# Patient Record
Sex: MALE | Race: BLACK OR AFRICAN AMERICAN | NOT HISPANIC OR LATINO | Employment: OTHER | URBAN - METROPOLITAN AREA
[De-identification: names, ages, dates, MRNs, and addresses within clinical notes are randomized per-mention and may not be internally consistent; named-entity substitution may affect disease eponyms.]

---

## 2020-11-13 ENCOUNTER — LAB REQUISITION (OUTPATIENT)
Dept: LAB | Facility: HOSPITAL | Age: 45
End: 2020-11-13

## 2020-11-13 DIAGNOSIS — Z00.00 ROUTINE GENERAL MEDICAL EXAMINATION AT A HEALTH CARE FACILITY: ICD-10-CM

## 2020-11-13 LAB — POTASSIUM SERPL-SCNC: 5.2 MMOL/L (ref 3.5–5.2)

## 2020-11-13 PROCEDURE — 84132 ASSAY OF SERUM POTASSIUM: CPT

## 2021-01-01 ENCOUNTER — APPOINTMENT (OUTPATIENT)
Dept: GENERAL RADIOLOGY | Facility: HOSPITAL | Age: 46
End: 2021-01-01

## 2021-01-01 ENCOUNTER — READMISSION MANAGEMENT (OUTPATIENT)
Dept: CALL CENTER | Facility: HOSPITAL | Age: 46
End: 2021-01-01

## 2021-01-01 ENCOUNTER — APPOINTMENT (OUTPATIENT)
Dept: NEPHROLOGY | Facility: HOSPITAL | Age: 46
End: 2021-01-01

## 2021-01-01 ENCOUNTER — APPOINTMENT (OUTPATIENT)
Dept: INTERVENTIONAL RADIOLOGY/VASCULAR | Facility: HOSPITAL | Age: 46
End: 2021-01-01

## 2021-01-01 ENCOUNTER — TRANSITIONAL CARE MANAGEMENT TELEPHONE ENCOUNTER (OUTPATIENT)
Dept: CALL CENTER | Facility: HOSPITAL | Age: 46
End: 2021-01-01

## 2021-01-01 ENCOUNTER — HOSPITAL ENCOUNTER (INPATIENT)
Facility: HOSPITAL | Age: 46
LOS: 9 days | Discharge: HOME HEALTH | End: 2021-10-29
Attending: EMERGENCY MEDICINE | Admitting: INTERNAL MEDICINE

## 2021-01-01 ENCOUNTER — HOSPITAL ENCOUNTER (INPATIENT)
Facility: HOSPITAL | Age: 46
LOS: 16 days | End: 2021-12-04
Attending: EMERGENCY MEDICINE | Admitting: INTERNAL MEDICINE

## 2021-01-01 ENCOUNTER — APPOINTMENT (OUTPATIENT)
Dept: CARDIOLOGY | Facility: HOSPITAL | Age: 46
End: 2021-01-01

## 2021-01-01 ENCOUNTER — TELEPHONE (OUTPATIENT)
Dept: INTERNAL MEDICINE | Facility: CLINIC | Age: 46
End: 2021-01-01

## 2021-01-01 ENCOUNTER — APPOINTMENT (OUTPATIENT)
Dept: CT IMAGING | Facility: HOSPITAL | Age: 46
End: 2021-01-01

## 2021-01-01 ENCOUNTER — HOME HEALTH ADMISSION (OUTPATIENT)
Dept: HOME HEALTH SERVICES | Facility: HOME HEALTHCARE | Age: 46
End: 2021-01-01

## 2021-01-01 VITALS
OXYGEN SATURATION: 97 % | TEMPERATURE: 97.6 F | SYSTOLIC BLOOD PRESSURE: 83 MMHG | BODY MASS INDEX: 20.59 KG/M2 | DIASTOLIC BLOOD PRESSURE: 60 MMHG | RESPIRATION RATE: 16 BRPM | HEART RATE: 68 BPM | WEIGHT: 139 LBS | HEIGHT: 69 IN

## 2021-01-01 VITALS
BODY MASS INDEX: 22.84 KG/M2 | SYSTOLIC BLOOD PRESSURE: 69 MMHG | OXYGEN SATURATION: 100 % | RESPIRATION RATE: 16 BRPM | WEIGHT: 168.65 LBS | DIASTOLIC BLOOD PRESSURE: 25 MMHG | HEIGHT: 72 IN | TEMPERATURE: 98 F | HEART RATE: 67 BPM

## 2021-01-01 DIAGNOSIS — N18.9 CHRONIC RENAL FAILURE, UNSPECIFIED CKD STAGE: ICD-10-CM

## 2021-01-01 DIAGNOSIS — E87.5 HYPERKALEMIA: ICD-10-CM

## 2021-01-01 DIAGNOSIS — I95.9 HYPOTENSION, UNSPECIFIED HYPOTENSION TYPE: ICD-10-CM

## 2021-01-01 DIAGNOSIS — I95.9 HYPOTENSION, UNSPECIFIED HYPOTENSION TYPE: Primary | ICD-10-CM

## 2021-01-01 DIAGNOSIS — N18.6 ESRD (END STAGE RENAL DISEASE) (HCC): ICD-10-CM

## 2021-01-01 DIAGNOSIS — R09.02 HYPOXIA: Primary | ICD-10-CM

## 2021-01-01 DIAGNOSIS — J44.1 COPD EXACERBATION (HCC): ICD-10-CM

## 2021-01-01 DIAGNOSIS — R09.02 HYPOXIA: ICD-10-CM

## 2021-01-01 DIAGNOSIS — J96.21 ACUTE ON CHRONIC RESPIRATORY FAILURE WITH HYPOXIA (HCC): ICD-10-CM

## 2021-01-01 DIAGNOSIS — D64.9 ANEMIA, UNSPECIFIED TYPE: ICD-10-CM

## 2021-01-01 LAB
ALBUMIN SERPL-MCNC: 2.5 G/DL (ref 3.5–5.2)
ALBUMIN SERPL-MCNC: 2.7 G/DL (ref 3.5–5.2)
ALBUMIN SERPL-MCNC: 3.1 G/DL (ref 3.5–5.2)
ALBUMIN SERPL-MCNC: 3.2 G/DL (ref 3.5–5.2)
ALBUMIN SERPL-MCNC: 3.2 G/DL (ref 3.5–5.2)
ALBUMIN SERPL-MCNC: 3.3 G/DL (ref 3.5–5.2)
ALBUMIN SERPL-MCNC: 3.3 G/DL (ref 3.5–5.2)
ALBUMIN SERPL-MCNC: 3.4 G/DL (ref 3.5–5.2)
ALBUMIN SERPL-MCNC: 3.5 G/DL (ref 3.5–5.2)
ALBUMIN SERPL-MCNC: 3.6 G/DL (ref 3.5–5.2)
ALBUMIN SERPL-MCNC: 3.7 G/DL (ref 3.5–5.2)
ALBUMIN SERPL-MCNC: 3.7 G/DL (ref 3.5–5.2)
ALBUMIN SERPL-MCNC: 3.8 G/DL (ref 3.5–5.2)
ALBUMIN SERPL-MCNC: 3.9 G/DL (ref 3.5–5.2)
ALBUMIN SERPL-MCNC: 4 G/DL (ref 3.5–5.2)
ALBUMIN SERPL-MCNC: 4.1 G/DL (ref 3.5–5.2)
ALBUMIN SERPL-MCNC: 4.1 G/DL (ref 3.5–5.2)
ALBUMIN SERPL-MCNC: 4.2 G/DL (ref 3.5–5.2)
ALBUMIN SERPL-MCNC: 4.4 G/DL (ref 3.5–5.2)
ALBUMIN/GLOB SERPL: 0.8 G/DL
ALBUMIN/GLOB SERPL: 0.9 G/DL
ALBUMIN/GLOB SERPL: 1 G/DL
ALBUMIN/GLOB SERPL: 1.1 G/DL
ALBUMIN/GLOB SERPL: 1.3 G/DL
ALP SERPL-CCNC: 199 U/L (ref 39–117)
ALP SERPL-CCNC: 245 U/L (ref 39–117)
ALP SERPL-CCNC: 249 U/L (ref 39–117)
ALP SERPL-CCNC: 277 U/L (ref 39–117)
ALP SERPL-CCNC: 277 U/L (ref 39–117)
ALP SERPL-CCNC: 307 U/L (ref 39–117)
ALP SERPL-CCNC: 324 U/L (ref 39–117)
ALP SERPL-CCNC: 335 U/L (ref 39–117)
ALT SERPL W P-5'-P-CCNC: 10 U/L (ref 1–41)
ALT SERPL W P-5'-P-CCNC: 11 U/L (ref 1–41)
ALT SERPL W P-5'-P-CCNC: 14 U/L (ref 1–41)
ALT SERPL W P-5'-P-CCNC: 17 U/L (ref 1–41)
ALT SERPL W P-5'-P-CCNC: 7 U/L (ref 1–41)
ALT SERPL W P-5'-P-CCNC: 7 U/L (ref 1–41)
ALT SERPL W P-5'-P-CCNC: 8 U/L (ref 1–41)
ALT SERPL W P-5'-P-CCNC: 8 U/L (ref 1–41)
ANION GAP SERPL CALCULATED.3IONS-SCNC: 10 MMOL/L (ref 5–15)
ANION GAP SERPL CALCULATED.3IONS-SCNC: 11 MMOL/L (ref 5–15)
ANION GAP SERPL CALCULATED.3IONS-SCNC: 12 MMOL/L (ref 5–15)
ANION GAP SERPL CALCULATED.3IONS-SCNC: 13 MMOL/L (ref 5–15)
ANION GAP SERPL CALCULATED.3IONS-SCNC: 13 MMOL/L (ref 5–15)
ANION GAP SERPL CALCULATED.3IONS-SCNC: 14 MMOL/L (ref 5–15)
ANION GAP SERPL CALCULATED.3IONS-SCNC: 15 MMOL/L (ref 5–15)
ANION GAP SERPL CALCULATED.3IONS-SCNC: 16 MMOL/L (ref 5–15)
ANION GAP SERPL CALCULATED.3IONS-SCNC: 17 MMOL/L (ref 5–15)
ANION GAP SERPL CALCULATED.3IONS-SCNC: 17 MMOL/L (ref 5–15)
ANION GAP SERPL CALCULATED.3IONS-SCNC: 18 MMOL/L (ref 5–15)
ANION GAP SERPL CALCULATED.3IONS-SCNC: 20 MMOL/L (ref 5–15)
ANION GAP SERPL CALCULATED.3IONS-SCNC: 22 MMOL/L (ref 5–15)
ANION GAP SERPL CALCULATED.3IONS-SCNC: 8 MMOL/L (ref 5–15)
ANION GAP SERPL CALCULATED.3IONS-SCNC: 9 MMOL/L (ref 5–15)
APTT PPP: 101.2 SECONDS (ref 50–95)
APTT PPP: 122 SECONDS (ref 50–95)
APTT PPP: 133.9 SECONDS (ref 50–95)
APTT PPP: 38.8 SECONDS (ref 50–95)
APTT PPP: 48.7 SECONDS (ref 50–95)
APTT PPP: 66.8 SECONDS (ref 50–95)
APTT PPP: 71.3 SECONDS (ref 50–95)
APTT PPP: 73.5 SECONDS (ref 50–95)
APTT PPP: 73.9 SECONDS (ref 50–95)
APTT PPP: 97.7 SECONDS (ref 50–95)
ARTERIAL PATENCY WRIST A: ABNORMAL
AST SERPL-CCNC: 11 U/L (ref 1–40)
AST SERPL-CCNC: 14 U/L (ref 1–40)
AST SERPL-CCNC: 14 U/L (ref 1–40)
AST SERPL-CCNC: 18 U/L (ref 1–40)
AST SERPL-CCNC: 27 U/L (ref 1–40)
AST SERPL-CCNC: 31 U/L (ref 1–40)
AST SERPL-CCNC: 33 U/L (ref 1–40)
AST SERPL-CCNC: 37 U/L (ref 1–40)
ATMOSPHERIC PRESS: ABNORMAL MM[HG]
BACTERIA SPEC AEROBE CULT: NORMAL
BASE EXCESS BLDA CALC-SCNC: -0.3 MMOL/L (ref 0–2)
BASE EXCESS BLDA CALC-SCNC: -1 MMOL/L (ref 0–2)
BASE EXCESS BLDA CALC-SCNC: -1.1 MMOL/L (ref 0–2)
BASE EXCESS BLDA CALC-SCNC: -1.2 MMOL/L (ref 0–2)
BASE EXCESS BLDA CALC-SCNC: -1.5 MMOL/L (ref 0–2)
BASE EXCESS BLDA CALC-SCNC: -3.6 MMOL/L (ref 0–2)
BASE EXCESS BLDA CALC-SCNC: -4.8 MMOL/L (ref 0–2)
BASOPHILS # BLD AUTO: 0.01 10*3/MM3 (ref 0–0.2)
BASOPHILS # BLD AUTO: 0.02 10*3/MM3 (ref 0–0.2)
BASOPHILS # BLD AUTO: 0.03 10*3/MM3 (ref 0–0.2)
BASOPHILS # BLD AUTO: 0.04 10*3/MM3 (ref 0–0.2)
BASOPHILS # BLD AUTO: 0.05 10*3/MM3 (ref 0–0.2)
BASOPHILS NFR BLD AUTO: 0.2 % (ref 0–1.5)
BASOPHILS NFR BLD AUTO: 0.3 % (ref 0–1.5)
BASOPHILS NFR BLD AUTO: 0.7 % (ref 0–1.5)
BASOPHILS NFR BLD AUTO: 0.8 % (ref 0–1.5)
BASOPHILS NFR BLD AUTO: 0.9 % (ref 0–1.5)
BASOPHILS NFR BLD AUTO: 0.9 % (ref 0–1.5)
BASOPHILS NFR BLD AUTO: 1 % (ref 0–1.5)
BASOPHILS NFR BLD AUTO: 1.1 % (ref 0–1.5)
BASOPHILS NFR BLD AUTO: 1.1 % (ref 0–1.5)
BDY SITE: ABNORMAL
BH CV ECHO MEAS - AO MAX PG (FULL): 0.88 MMHG
BH CV ECHO MEAS - AO MAX PG (FULL): 2.6 MMHG
BH CV ECHO MEAS - AO MAX PG: 3 MMHG
BH CV ECHO MEAS - AO MAX PG: 6 MMHG
BH CV ECHO MEAS - AO MEAN PG (FULL): 0.69 MMHG
BH CV ECHO MEAS - AO MEAN PG (FULL): 1 MMHG
BH CV ECHO MEAS - AO MEAN PG: 1.7 MMHG
BH CV ECHO MEAS - AO MEAN PG: 3 MMHG
BH CV ECHO MEAS - AO ROOT AREA (BSA CORRECTED): 1.7
BH CV ECHO MEAS - AO ROOT AREA (BSA CORRECTED): 1.9
BH CV ECHO MEAS - AO ROOT AREA: 7.3 CM^2
BH CV ECHO MEAS - AO ROOT AREA: 8.6 CM^2
BH CV ECHO MEAS - AO ROOT DIAM: 3.1 CM
BH CV ECHO MEAS - AO ROOT DIAM: 3.3 CM
BH CV ECHO MEAS - AO V2 MAX: 124 CM/SEC
BH CV ECHO MEAS - AO V2 MAX: 90.1 CM/SEC
BH CV ECHO MEAS - AO V2 MEAN: 61.4 CM/SEC
BH CV ECHO MEAS - AO V2 MEAN: 76.8 CM/SEC
BH CV ECHO MEAS - AO V2 VTI: 14.2 CM
BH CV ECHO MEAS - AO V2 VTI: 26.1 CM
BH CV ECHO MEAS - ASC AORTA: 2.9 CM
BH CV ECHO MEAS - AVA(I,A): 2.8 CM^2
BH CV ECHO MEAS - AVA(I,A): 3 CM^2
BH CV ECHO MEAS - AVA(I,D): 2.8 CM^2
BH CV ECHO MEAS - AVA(I,D): 3 CM^2
BH CV ECHO MEAS - AVA(V,A): 2.5 CM^2
BH CV ECHO MEAS - AVA(V,A): 2.8 CM^2
BH CV ECHO MEAS - AVA(V,D): 2.5 CM^2
BH CV ECHO MEAS - AVA(V,D): 2.8 CM^2
BH CV ECHO MEAS - BSA(HAYCOCK): 1.7 M^2
BH CV ECHO MEAS - BSA(HAYCOCK): 1.8 M^2
BH CV ECHO MEAS - BSA: 1.8 M^2
BH CV ECHO MEAS - BSA: 1.8 M^2
BH CV ECHO MEAS - BZI_BMI: 17.1 KILOGRAMS/M^2
BH CV ECHO MEAS - BZI_BMI: 21.7 KILOGRAMS/M^2
BH CV ECHO MEAS - BZI_METRIC_HEIGHT: 175.3 CM
BH CV ECHO MEAS - BZI_METRIC_HEIGHT: 182.9 CM
BH CV ECHO MEAS - BZI_METRIC_WEIGHT: 57.2 KG
BH CV ECHO MEAS - BZI_METRIC_WEIGHT: 66.7 KG
BH CV ECHO MEAS - EDV(CUBED): 22.7 ML
BH CV ECHO MEAS - EDV(CUBED): 74.1 ML
BH CV ECHO MEAS - EDV(MOD-SP2): 106 ML
BH CV ECHO MEAS - EDV(MOD-SP2): 37.1 ML
BH CV ECHO MEAS - EDV(MOD-SP4): 110 ML
BH CV ECHO MEAS - EDV(MOD-SP4): 51.5 ML
BH CV ECHO MEAS - EDV(TEICH): 30.4 ML
BH CV ECHO MEAS - EDV(TEICH): 78.6 ML
BH CV ECHO MEAS - EF(CUBED): 37.5 %
BH CV ECHO MEAS - EF(CUBED): 70.4 %
BH CV ECHO MEAS - EF(MOD-BP): 48.9 %
BH CV ECHO MEAS - EF(MOD-BP): 62 %
BH CV ECHO MEAS - EF(MOD-SP2): 51.5 %
BH CV ECHO MEAS - EF(MOD-SP2): 60.8 %
BH CV ECHO MEAS - EF(MOD-SP4): 47.4 %
BH CV ECHO MEAS - EF(MOD-SP4): 62.3 %
BH CV ECHO MEAS - EF(TEICH): 32.2 %
BH CV ECHO MEAS - EF(TEICH): 62.4 %
BH CV ECHO MEAS - ESV(CUBED): 14.2 ML
BH CV ECHO MEAS - ESV(CUBED): 22 ML
BH CV ECHO MEAS - ESV(MOD-SP2): 18 ML
BH CV ECHO MEAS - ESV(MOD-SP2): 41.6 ML
BH CV ECHO MEAS - ESV(MOD-SP4): 27.1 ML
BH CV ECHO MEAS - ESV(MOD-SP4): 41.5 ML
BH CV ECHO MEAS - ESV(TEICH): 20.6 ML
BH CV ECHO MEAS - ESV(TEICH): 29.6 ML
BH CV ECHO MEAS - FS: 14.5 %
BH CV ECHO MEAS - FS: 33.3 %
BH CV ECHO MEAS - IVS/LVPW: 0.83
BH CV ECHO MEAS - IVS/LVPW: 1.3
BH CV ECHO MEAS - IVSD: 1 CM
BH CV ECHO MEAS - IVSD: 1.1 CM
BH CV ECHO MEAS - LA DIMENSION: 2.1 CM
BH CV ECHO MEAS - LA DIMENSION: 3.5 CM
BH CV ECHO MEAS - LA/AO: 0.62
BH CV ECHO MEAS - LA/AO: 1.1
BH CV ECHO MEAS - LAD MAJOR: 5.8 CM
BH CV ECHO MEAS - LAD MAJOR: 7 CM
BH CV ECHO MEAS - LAT PEAK E' VEL: 14.4 CM/SEC
BH CV ECHO MEAS - LAT PEAK E' VEL: 9.2 CM/SEC
BH CV ECHO MEAS - LATERAL E/E' RATIO: 6.2
BH CV ECHO MEAS - LATERAL E/E' RATIO: 6.7
BH CV ECHO MEAS - LV DIASTOLIC VOL/BSA (35-75): 29.4 ML/M^2
BH CV ECHO MEAS - LV DIASTOLIC VOL/BSA (35-75): 60.7 ML/M^2
BH CV ECHO MEAS - LV IVRT: 0.09 SEC
BH CV ECHO MEAS - LV MASS(C)D: 157.1 GRAMS
BH CV ECHO MEAS - LV MASS(C)D: 73 GRAMS
BH CV ECHO MEAS - LV MASS(C)DI: 41.7 GRAMS/M^2
BH CV ECHO MEAS - LV MASS(C)DI: 86.7 GRAMS/M^2
BH CV ECHO MEAS - LV MAX PG: 2.1 MMHG
BH CV ECHO MEAS - LV MAX PG: 3.4 MMHG
BH CV ECHO MEAS - LV MEAN PG: 1 MMHG
BH CV ECHO MEAS - LV MEAN PG: 2 MMHG
BH CV ECHO MEAS - LV SYSTOLIC VOL/BSA (12-30): 15.5 ML/M^2
BH CV ECHO MEAS - LV SYSTOLIC VOL/BSA (12-30): 22.9 ML/M^2
BH CV ECHO MEAS - LV V1 MAX: 72.8 CM/SEC
BH CV ECHO MEAS - LV V1 MAX: 92 CM/SEC
BH CV ECHO MEAS - LV V1 MEAN: 47.4 CM/SEC
BH CV ECHO MEAS - LV V1 MEAN: 58.2 CM/SEC
BH CV ECHO MEAS - LV V1 VTI: 12.8 CM
BH CV ECHO MEAS - LV V1 VTI: 20.6 CM
BH CV ECHO MEAS - LVIDD: 2.8 CM
BH CV ECHO MEAS - LVIDD: 4.2 CM
BH CV ECHO MEAS - LVIDS: 2.4 CM
BH CV ECHO MEAS - LVIDS: 2.8 CM
BH CV ECHO MEAS - LVLD AP2: 6.6 CM
BH CV ECHO MEAS - LVLD AP2: 8 CM
BH CV ECHO MEAS - LVLD AP4: 6.3 CM
BH CV ECHO MEAS - LVLD AP4: 7.7 CM
BH CV ECHO MEAS - LVLS AP2: 5.7 CM
BH CV ECHO MEAS - LVLS AP2: 6.6 CM
BH CV ECHO MEAS - LVLS AP4: 5.3 CM
BH CV ECHO MEAS - LVLS AP4: 6.4 CM
BH CV ECHO MEAS - LVOT AREA (M): 3.1 CM^2
BH CV ECHO MEAS - LVOT AREA (M): 3.8 CM^2
BH CV ECHO MEAS - LVOT AREA: 3.1 CM^2
BH CV ECHO MEAS - LVOT AREA: 3.8 CM^2
BH CV ECHO MEAS - LVOT DIAM: 2 CM
BH CV ECHO MEAS - LVOT DIAM: 2.2 CM
BH CV ECHO MEAS - LVPWD: 0.86 CM
BH CV ECHO MEAS - LVPWD: 1.2 CM
BH CV ECHO MEAS - MED PEAK E' VEL: 10 CM/SEC
BH CV ECHO MEAS - MED PEAK E' VEL: 7.4 CM/SEC
BH CV ECHO MEAS - MEDIAL E/E' RATIO: 7.7
BH CV ECHO MEAS - MEDIAL E/E' RATIO: 9.6
BH CV ECHO MEAS - MV A MAX VEL: 46.3 CM/SEC
BH CV ECHO MEAS - MV A MAX VEL: 50.6 CM/SEC
BH CV ECHO MEAS - MV DEC SLOPE: 315.7 CM/SEC^2
BH CV ECHO MEAS - MV DEC SLOPE: 543 CM/SEC^2
BH CV ECHO MEAS - MV DEC TIME: 0.23 SEC
BH CV ECHO MEAS - MV DEC TIME: 0.32 SEC
BH CV ECHO MEAS - MV E MAX VEL: 57 CM/SEC
BH CV ECHO MEAS - MV E MAX VEL: 96 CM/SEC
BH CV ECHO MEAS - MV E/A: 1.1
BH CV ECHO MEAS - MV E/A: 2.1
BH CV ECHO MEAS - MV MAX PG: 1.8 MMHG
BH CV ECHO MEAS - MV MAX PG: 7.2 MMHG
BH CV ECHO MEAS - MV MEAN PG: 0.9 MMHG
BH CV ECHO MEAS - MV MEAN PG: 2 MMHG
BH CV ECHO MEAS - MV P1/2T MAX VEL: 137 CM/SEC
BH CV ECHO MEAS - MV P1/2T MAX VEL: 72.2 CM/SEC
BH CV ECHO MEAS - MV P1/2T: 67 MSEC
BH CV ECHO MEAS - MV P1/2T: 73.9 MSEC
BH CV ECHO MEAS - MV V2 MAX: 134 CM/SEC
BH CV ECHO MEAS - MV V2 MAX: 66.8 CM/SEC
BH CV ECHO MEAS - MV V2 MEAN: 45.5 CM/SEC
BH CV ECHO MEAS - MV V2 MEAN: 53.2 CM/SEC
BH CV ECHO MEAS - MV V2 VTI: 16.3 CM
BH CV ECHO MEAS - MV V2 VTI: 37.2 CM
BH CV ECHO MEAS - MVA P1/2T LCG: 1.6 CM^2
BH CV ECHO MEAS - MVA P1/2T LCG: 3 CM^2
BH CV ECHO MEAS - MVA(P1/2T): 3 CM^2
BH CV ECHO MEAS - MVA(P1/2T): 3.3 CM^2
BH CV ECHO MEAS - MVA(VTI): 2.1 CM^2
BH CV ECHO MEAS - MVA(VTI): 2.4 CM^2
BH CV ECHO MEAS - PA ACC TIME: 0.1 SEC
BH CV ECHO MEAS - PA ACC TIME: 0.12 SEC
BH CV ECHO MEAS - PA MAX PG: 1.3 MMHG
BH CV ECHO MEAS - PA MAX PG: 2.4 MMHG
BH CV ECHO MEAS - PA PR(ACCEL): 25 MMHG
BH CV ECHO MEAS - PA PR(ACCEL): 32.7 MMHG
BH CV ECHO MEAS - PA V2 MAX: 56.6 CM/SEC
BH CV ECHO MEAS - PA V2 MAX: 76.8 CM/SEC
BH CV ECHO MEAS - RAP SYSTOLE: 8 MMHG
BH CV ECHO MEAS - RVSP: 44 MMHG
BH CV ECHO MEAS - SI(AO): 105.2 ML/M^2
BH CV ECHO MEAS - SI(AO): 70.1 ML/M^2
BH CV ECHO MEAS - SI(CUBED): 28.8 ML/M^2
BH CV ECHO MEAS - SI(CUBED): 4.9 ML/M^2
BH CV ECHO MEAS - SI(LVOT): 22.8 ML/M^2
BH CV ECHO MEAS - SI(LVOT): 43.2 ML/M^2
BH CV ECHO MEAS - SI(MOD-SP2): 10.9 ML/M^2
BH CV ECHO MEAS - SI(MOD-SP2): 35.5 ML/M^2
BH CV ECHO MEAS - SI(MOD-SP4): 13.9 ML/M^2
BH CV ECHO MEAS - SI(MOD-SP4): 37.8 ML/M^2
BH CV ECHO MEAS - SI(TEICH): 27.1 ML/M^2
BH CV ECHO MEAS - SI(TEICH): 5.6 ML/M^2
BH CV ECHO MEAS - SV(AO): 122.8 ML
BH CV ECHO MEAS - SV(AO): 190.7 ML
BH CV ECHO MEAS - SV(CUBED): 52.1 ML
BH CV ECHO MEAS - SV(CUBED): 8.5 ML
BH CV ECHO MEAS - SV(LVOT): 39.9 ML
BH CV ECHO MEAS - SV(LVOT): 78.3 ML
BH CV ECHO MEAS - SV(MOD-SP2): 19.1 ML
BH CV ECHO MEAS - SV(MOD-SP2): 64.4 ML
BH CV ECHO MEAS - SV(MOD-SP4): 24.4 ML
BH CV ECHO MEAS - SV(MOD-SP4): 68.5 ML
BH CV ECHO MEAS - SV(TEICH): 49 ML
BH CV ECHO MEAS - SV(TEICH): 9.8 ML
BH CV ECHO MEAS - TAPSE (>1.6): 1.1 CM
BH CV ECHO MEAS - TAPSE (>1.6): 1.8 CM
BH CV ECHO MEAS - TR MAX PG: 36 MMHG
BH CV ECHO MEAS - TR MAX PG: 9 MMHG
BH CV ECHO MEAS - TR MAX VEL: 254.3 CM/SEC
BH CV ECHO MEASUREMENTS AVERAGE E/E' RATIO: 6.87
BH CV ECHO MEASUREMENTS AVERAGE E/E' RATIO: 7.87
BH CV VAS BP LEFT ARM: NORMAL MMHG
BH CV XLRA - RV BASE: 3.3 CM
BH CV XLRA - RV BASE: 3.7 CM
BH CV XLRA - RV LENGTH: 4.9 CM
BH CV XLRA - RV LENGTH: 6.2 CM
BH CV XLRA - RV MID: 2.4 CM
BH CV XLRA - RV MID: 2.9 CM
BH CV XLRA - TDI S': 11.2 CM/SEC
BH CV XLRA - TDI S': 7.5 CM/SEC
BILIRUB CONJ SERPL-MCNC: 0.2 MG/DL (ref 0–0.3)
BILIRUB INDIRECT SERPL-MCNC: 0.2 MG/DL
BILIRUB SERPL-MCNC: 0.3 MG/DL (ref 0–1.2)
BILIRUB SERPL-MCNC: 0.4 MG/DL (ref 0–1.2)
BILIRUB SERPL-MCNC: 0.5 MG/DL (ref 0–1.2)
BILIRUB SERPL-MCNC: 0.5 MG/DL (ref 0–1.2)
BILIRUB SERPL-MCNC: 1.1 MG/DL (ref 0–1.2)
BILIRUB SERPL-MCNC: 1.2 MG/DL (ref 0–1.2)
BODY TEMPERATURE: 37 C
BUN SERPL-MCNC: 12 MG/DL (ref 6–20)
BUN SERPL-MCNC: 17 MG/DL (ref 6–20)
BUN SERPL-MCNC: 17 MG/DL (ref 6–20)
BUN SERPL-MCNC: 24 MG/DL (ref 6–20)
BUN SERPL-MCNC: 25 MG/DL (ref 6–20)
BUN SERPL-MCNC: 27 MG/DL (ref 6–20)
BUN SERPL-MCNC: 3 MG/DL (ref 6–20)
BUN SERPL-MCNC: 30 MG/DL (ref 6–20)
BUN SERPL-MCNC: 4 MG/DL (ref 6–20)
BUN SERPL-MCNC: 40 MG/DL (ref 6–20)
BUN SERPL-MCNC: 43 MG/DL (ref 6–20)
BUN SERPL-MCNC: 5 MG/DL (ref 6–20)
BUN SERPL-MCNC: 51 MG/DL (ref 6–20)
BUN SERPL-MCNC: 51 MG/DL (ref 6–20)
BUN SERPL-MCNC: 52 MG/DL (ref 6–20)
BUN SERPL-MCNC: 52 MG/DL (ref 6–20)
BUN SERPL-MCNC: 6 MG/DL (ref 6–20)
BUN SERPL-MCNC: 60 MG/DL (ref 6–20)
BUN SERPL-MCNC: 7 MG/DL (ref 6–20)
BUN SERPL-MCNC: 70 MG/DL (ref 6–20)
BUN SERPL-MCNC: 71 MG/DL (ref 6–20)
BUN SERPL-MCNC: 74 MG/DL (ref 6–20)
BUN SERPL-MCNC: 74 MG/DL (ref 6–20)
BUN SERPL-MCNC: 9 MG/DL (ref 6–20)
BUN/CREAT SERPL: 2.6 (ref 7–25)
BUN/CREAT SERPL: 2.6 (ref 7–25)
BUN/CREAT SERPL: 2.8 (ref 7–25)
BUN/CREAT SERPL: 2.9 (ref 7–25)
BUN/CREAT SERPL: 3 (ref 7–25)
BUN/CREAT SERPL: 3 (ref 7–25)
BUN/CREAT SERPL: 3.1 (ref 7–25)
BUN/CREAT SERPL: 3.2 (ref 7–25)
BUN/CREAT SERPL: 3.4 (ref 7–25)
BUN/CREAT SERPL: 3.4 (ref 7–25)
BUN/CREAT SERPL: 3.5 (ref 7–25)
BUN/CREAT SERPL: 3.6 (ref 7–25)
BUN/CREAT SERPL: 3.7 (ref 7–25)
BUN/CREAT SERPL: 3.7 (ref 7–25)
BUN/CREAT SERPL: 3.8 (ref 7–25)
BUN/CREAT SERPL: 3.9 (ref 7–25)
BUN/CREAT SERPL: 4 (ref 7–25)
BUN/CREAT SERPL: 4.1 (ref 7–25)
BUN/CREAT SERPL: 4.1 (ref 7–25)
BUN/CREAT SERPL: 4.2 (ref 7–25)
BUN/CREAT SERPL: 4.3 (ref 7–25)
BUN/CREAT SERPL: 4.4 (ref 7–25)
BUN/CREAT SERPL: 4.5 (ref 7–25)
BUN/CREAT SERPL: 4.5 (ref 7–25)
BUN/CREAT SERPL: 4.6 (ref 7–25)
BUN/CREAT SERPL: 4.7 (ref 7–25)
BUN/CREAT SERPL: 4.7 (ref 7–25)
BUN/CREAT SERPL: 4.8 (ref 7–25)
BUN/CREAT SERPL: 5.1 (ref 7–25)
BUN/CREAT SERPL: 5.1 (ref 7–25)
BUN/CREAT SERPL: 5.2 (ref 7–25)
BUN/CREAT SERPL: 5.3 (ref 7–25)
BUN/CREAT SERPL: 5.3 (ref 7–25)
BUN/CREAT SERPL: 5.9 (ref 7–25)
BUN/CREAT SERPL: 6.2 (ref 7–25)
BUN/CREAT SERPL: 6.9 (ref 7–25)
BZE SPEC-MCNC: >1500 NG/ML
CA-I BLD-MCNC: 5 MG/DL (ref 4.6–5.4)
CA-I SERPL ISE-MCNC: 1.12 MMOL/L (ref 1.12–1.32)
CA-I SERPL ISE-MCNC: 1.18 MMOL/L (ref 1.12–1.32)
CA-I SERPL ISE-MCNC: 1.19 MMOL/L (ref 1.12–1.32)
CA-I SERPL ISE-MCNC: 1.19 MMOL/L (ref 1.12–1.32)
CA-I SERPL ISE-MCNC: 1.2 MMOL/L (ref 1.12–1.32)
CA-I SERPL ISE-MCNC: 1.21 MMOL/L (ref 1.12–1.32)
CA-I SERPL ISE-MCNC: 1.22 MMOL/L (ref 1.12–1.32)
CA-I SERPL ISE-MCNC: 1.22 MMOL/L (ref 1.12–1.32)
CA-I SERPL ISE-MCNC: 1.23 MMOL/L (ref 1.12–1.32)
CA-I SERPL ISE-MCNC: 1.24 MMOL/L (ref 1.12–1.32)
CA-I SERPL ISE-MCNC: 1.25 MMOL/L (ref 1.12–1.32)
CA-I SERPL ISE-MCNC: 1.25 MMOL/L (ref 1.15–1.35)
CA-I SERPL ISE-MCNC: 1.26 MMOL/L (ref 1.12–1.32)
CA-I SERPL ISE-MCNC: 1.27 MMOL/L (ref 1.12–1.32)
CA-I SERPL ISE-MCNC: 1.28 MMOL/L (ref 1.12–1.32)
CA-I SERPL ISE-MCNC: 1.29 MMOL/L (ref 1.12–1.32)
CA-I SERPL ISE-MCNC: 1.29 MMOL/L (ref 1.12–1.32)
CA-I SERPL ISE-MCNC: 1.32 MMOL/L (ref 1.12–1.32)
CALCIUM SPEC-SCNC: 7.2 MG/DL (ref 8.6–10.5)
CALCIUM SPEC-SCNC: 8 MG/DL (ref 8.6–10.5)
CALCIUM SPEC-SCNC: 8.5 MG/DL (ref 8.6–10.5)
CALCIUM SPEC-SCNC: 8.6 MG/DL (ref 8.6–10.5)
CALCIUM SPEC-SCNC: 8.7 MG/DL (ref 8.6–10.5)
CALCIUM SPEC-SCNC: 8.8 MG/DL (ref 8.6–10.5)
CALCIUM SPEC-SCNC: 8.9 MG/DL (ref 8.6–10.5)
CALCIUM SPEC-SCNC: 9 MG/DL (ref 8.6–10.5)
CALCIUM SPEC-SCNC: 9.1 MG/DL (ref 8.6–10.5)
CALCIUM SPEC-SCNC: 9.2 MG/DL (ref 8.6–10.5)
CALCIUM SPEC-SCNC: 9.3 MG/DL (ref 8.6–10.5)
CALCIUM SPEC-SCNC: 9.4 MG/DL (ref 8.6–10.5)
CALCIUM SPEC-SCNC: 9.6 MG/DL (ref 8.6–10.5)
CHLORIDE SERPL-SCNC: 100 MMOL/L (ref 98–107)
CHLORIDE SERPL-SCNC: 101 MMOL/L (ref 98–107)
CHLORIDE SERPL-SCNC: 102 MMOL/L (ref 98–107)
CHLORIDE SERPL-SCNC: 102 MMOL/L (ref 98–107)
CHLORIDE SERPL-SCNC: 103 MMOL/L (ref 98–107)
CHLORIDE SERPL-SCNC: 104 MMOL/L (ref 98–107)
CHLORIDE SERPL-SCNC: 105 MMOL/L (ref 98–107)
CHLORIDE SERPL-SCNC: 106 MMOL/L (ref 98–107)
CHLORIDE SERPL-SCNC: 107 MMOL/L (ref 98–107)
CHLORIDE SERPL-SCNC: 110 MMOL/L (ref 98–107)
CHLORIDE SERPL-SCNC: 97 MMOL/L (ref 98–107)
CHLORIDE SERPL-SCNC: 98 MMOL/L (ref 98–107)
CHLORIDE SERPL-SCNC: 99 MMOL/L (ref 98–107)
CHOLEST SERPL-MCNC: 107 MG/DL (ref 0–200)
CO2 BLDA-SCNC: 22.3 MMOL/L (ref 22–33)
CO2 BLDA-SCNC: 22.9 MMOL/L (ref 22–33)
CO2 BLDA-SCNC: 23.2 MMOL/L (ref 22–33)
CO2 BLDA-SCNC: 23.9 MMOL/L (ref 22–33)
CO2 BLDA-SCNC: 24.6 MMOL/L (ref 22–33)
CO2 BLDA-SCNC: 26.7 MMOL/L (ref 22–33)
CO2 BLDA-SCNC: 30 MMOL/L (ref 22–33)
CO2 SERPL-SCNC: 17 MMOL/L (ref 22–29)
CO2 SERPL-SCNC: 18 MMOL/L (ref 22–29)
CO2 SERPL-SCNC: 19 MMOL/L (ref 22–29)
CO2 SERPL-SCNC: 19 MMOL/L (ref 22–29)
CO2 SERPL-SCNC: 20 MMOL/L (ref 22–29)
CO2 SERPL-SCNC: 21 MMOL/L (ref 22–29)
CO2 SERPL-SCNC: 22 MMOL/L (ref 22–29)
CO2 SERPL-SCNC: 23 MMOL/L (ref 22–29)
CO2 SERPL-SCNC: 24 MMOL/L (ref 22–29)
CO2 SERPL-SCNC: 24 MMOL/L (ref 22–29)
CO2 SERPL-SCNC: 25 MMOL/L (ref 22–29)
CO2 SERPL-SCNC: 26 MMOL/L (ref 22–29)
COCAINE SPEC QL CFM: POSITIVE
COCAINE SPEC-MCNC: NEGATIVE NG/ML
COHGB MFR BLD: 1 % (ref 0–2)
COHGB MFR BLD: 1 % (ref 0–2)
COHGB MFR BLD: 1.2 % (ref 0–2)
COHGB MFR BLD: 1.4 % (ref 0–2)
COHGB MFR BLD: 1.4 % (ref 0–2)
COHGB MFR BLD: 1.5 % (ref 0–2)
COHGB MFR BLD: 1.5 % (ref 0–2)
CORTIS SERPL-MCNC: 5.82 MCG/DL
CORTIS SERPL-MCNC: 8.72 MCG/DL
CREAT SERPL-MCNC: 0.81 MG/DL (ref 0.76–1.27)
CREAT SERPL-MCNC: 0.83 MG/DL (ref 0.76–1.27)
CREAT SERPL-MCNC: 0.89 MG/DL (ref 0.76–1.27)
CREAT SERPL-MCNC: 0.91 MG/DL (ref 0.76–1.27)
CREAT SERPL-MCNC: 0.93 MG/DL (ref 0.76–1.27)
CREAT SERPL-MCNC: 0.94 MG/DL (ref 0.76–1.27)
CREAT SERPL-MCNC: 0.96 MG/DL (ref 0.76–1.27)
CREAT SERPL-MCNC: 0.96 MG/DL (ref 0.76–1.27)
CREAT SERPL-MCNC: 0.99 MG/DL (ref 0.76–1.27)
CREAT SERPL-MCNC: 1 MG/DL (ref 0.76–1.27)
CREAT SERPL-MCNC: 1.01 MG/DL (ref 0.76–1.27)
CREAT SERPL-MCNC: 1.01 MG/DL (ref 0.76–1.27)
CREAT SERPL-MCNC: 1.04 MG/DL (ref 0.76–1.27)
CREAT SERPL-MCNC: 1.05 MG/DL (ref 0.76–1.27)
CREAT SERPL-MCNC: 1.07 MG/DL (ref 0.76–1.27)
CREAT SERPL-MCNC: 1.11 MG/DL (ref 0.76–1.27)
CREAT SERPL-MCNC: 1.11 MG/DL (ref 0.76–1.27)
CREAT SERPL-MCNC: 1.13 MG/DL (ref 0.76–1.27)
CREAT SERPL-MCNC: 1.13 MG/DL (ref 0.76–1.27)
CREAT SERPL-MCNC: 1.14 MG/DL (ref 0.76–1.27)
CREAT SERPL-MCNC: 1.25 MG/DL (ref 0.76–1.27)
CREAT SERPL-MCNC: 1.28 MG/DL (ref 0.76–1.27)
CREAT SERPL-MCNC: 1.31 MG/DL (ref 0.76–1.27)
CREAT SERPL-MCNC: 1.46 MG/DL (ref 0.76–1.27)
CREAT SERPL-MCNC: 1.48 MG/DL (ref 0.76–1.27)
CREAT SERPL-MCNC: 1.55 MG/DL (ref 0.76–1.27)
CREAT SERPL-MCNC: 1.77 MG/DL (ref 0.76–1.27)
CREAT SERPL-MCNC: 1.81 MG/DL (ref 0.76–1.27)
CREAT SERPL-MCNC: 1.88 MG/DL (ref 0.76–1.27)
CREAT SERPL-MCNC: 11.75 MG/DL (ref 0.76–1.27)
CREAT SERPL-MCNC: 12.44 MG/DL (ref 0.76–1.27)
CREAT SERPL-MCNC: 13.84 MG/DL (ref 0.76–1.27)
CREAT SERPL-MCNC: 14.35 MG/DL (ref 0.76–1.27)
CREAT SERPL-MCNC: 16.92 MG/DL (ref 0.76–1.27)
CREAT SERPL-MCNC: 18.1 MG/DL (ref 0.76–1.27)
CREAT SERPL-MCNC: 2.07 MG/DL (ref 0.76–1.27)
CREAT SERPL-MCNC: 2.61 MG/DL (ref 0.76–1.27)
CREAT SERPL-MCNC: 2.88 MG/DL (ref 0.76–1.27)
CREAT SERPL-MCNC: 3.07 MG/DL (ref 0.76–1.27)
CREAT SERPL-MCNC: 4.13 MG/DL (ref 0.76–1.27)
CREAT SERPL-MCNC: 5.16 MG/DL (ref 0.76–1.27)
CREAT SERPL-MCNC: 5.48 MG/DL (ref 0.76–1.27)
CREAT SERPL-MCNC: 5.7 MG/DL (ref 0.76–1.27)
CREAT SERPL-MCNC: 6 MG/DL (ref 0.76–1.27)
CREAT SERPL-MCNC: 6.49 MG/DL (ref 0.76–1.27)
CREAT SERPL-MCNC: 6.49 MG/DL (ref 0.76–1.27)
CREAT SERPL-MCNC: 6.89 MG/DL (ref 0.76–1.27)
CREAT SERPL-MCNC: 8.68 MG/DL (ref 0.76–1.27)
CREAT SERPL-MCNC: 9.89 MG/DL (ref 0.76–1.27)
CREAT SERPL-MCNC: 9.94 MG/DL (ref 0.76–1.27)
CREAT SERPL-MCNC: 9.94 MG/DL (ref 0.76–1.27)
CRP SERPL-MCNC: 2.78 MG/DL (ref 0–0.5)
D-LACTATE SERPL-SCNC: 1.6 MMOL/L (ref 0.5–2)
D-LACTATE SERPL-SCNC: 1.8 MMOL/L (ref 0.5–2)
DEPRECATED RDW RBC AUTO: 57.5 FL (ref 37–54)
DEPRECATED RDW RBC AUTO: 57.8 FL (ref 37–54)
DEPRECATED RDW RBC AUTO: 57.9 FL (ref 37–54)
DEPRECATED RDW RBC AUTO: 58.2 FL (ref 37–54)
DEPRECATED RDW RBC AUTO: 58.3 FL (ref 37–54)
DEPRECATED RDW RBC AUTO: 58.4 FL (ref 37–54)
DEPRECATED RDW RBC AUTO: 58.4 FL (ref 37–54)
DEPRECATED RDW RBC AUTO: 58.8 FL (ref 37–54)
DEPRECATED RDW RBC AUTO: 59.1 FL (ref 37–54)
DEPRECATED RDW RBC AUTO: 59.1 FL (ref 37–54)
DEPRECATED RDW RBC AUTO: 59.4 FL (ref 37–54)
DEPRECATED RDW RBC AUTO: 59.8 FL (ref 37–54)
DEPRECATED RDW RBC AUTO: 59.9 FL (ref 37–54)
DEPRECATED RDW RBC AUTO: 60.4 FL (ref 37–54)
DEPRECATED RDW RBC AUTO: 60.7 FL (ref 37–54)
DEPRECATED RDW RBC AUTO: 61 FL (ref 37–54)
DEPRECATED RDW RBC AUTO: 61.6 FL (ref 37–54)
DEPRECATED RDW RBC AUTO: 63.3 FL (ref 37–54)
DEPRECATED RDW RBC AUTO: 64.8 FL (ref 37–54)
DEPRECATED RDW RBC AUTO: 66.4 FL (ref 37–54)
DEPRECATED RDW RBC AUTO: 67.4 FL (ref 37–54)
DEPRECATED RDW RBC AUTO: 67.5 FL (ref 37–54)
DEPRECATED RDW RBC AUTO: 67.7 FL (ref 37–54)
EOSINOPHIL # BLD AUTO: 0 10*3/MM3 (ref 0–0.4)
EOSINOPHIL # BLD AUTO: 0.01 10*3/MM3 (ref 0–0.4)
EOSINOPHIL # BLD AUTO: 0.16 10*3/MM3 (ref 0–0.4)
EOSINOPHIL # BLD AUTO: 0.19 10*3/MM3 (ref 0–0.4)
EOSINOPHIL # BLD AUTO: 0.23 10*3/MM3 (ref 0–0.4)
EOSINOPHIL # BLD AUTO: 0.24 10*3/MM3 (ref 0–0.4)
EOSINOPHIL # BLD AUTO: 0.25 10*3/MM3 (ref 0–0.4)
EOSINOPHIL # BLD AUTO: 0.33 10*3/MM3 (ref 0–0.4)
EOSINOPHIL # BLD AUTO: 0.36 10*3/MM3 (ref 0–0.4)
EOSINOPHIL # BLD AUTO: 0.36 10*3/MM3 (ref 0–0.4)
EOSINOPHIL # BLD AUTO: 0.39 10*3/MM3 (ref 0–0.4)
EOSINOPHIL NFR BLD AUTO: 0 % (ref 0.3–6.2)
EOSINOPHIL NFR BLD AUTO: 0.2 % (ref 0.3–6.2)
EOSINOPHIL NFR BLD AUTO: 12 % (ref 0.3–6.2)
EOSINOPHIL NFR BLD AUTO: 12.4 % (ref 0.3–6.2)
EOSINOPHIL NFR BLD AUTO: 14.3 % (ref 0.3–6.2)
EOSINOPHIL NFR BLD AUTO: 4.3 % (ref 0.3–6.2)
EOSINOPHIL NFR BLD AUTO: 5.6 % (ref 0.3–6.2)
EOSINOPHIL NFR BLD AUTO: 5.6 % (ref 0.3–6.2)
EOSINOPHIL NFR BLD AUTO: 6.1 % (ref 0.3–6.2)
EOSINOPHIL NFR BLD AUTO: 7.2 % (ref 0.3–6.2)
EOSINOPHIL NFR BLD AUTO: 9.5 % (ref 0.3–6.2)
EPAP: 0
ERYTHROCYTE [DISTWIDTH] IN BLOOD BY AUTOMATED COUNT: 17.5 % (ref 12.3–15.4)
ERYTHROCYTE [DISTWIDTH] IN BLOOD BY AUTOMATED COUNT: 17.5 % (ref 12.3–15.4)
ERYTHROCYTE [DISTWIDTH] IN BLOOD BY AUTOMATED COUNT: 17.6 % (ref 12.3–15.4)
ERYTHROCYTE [DISTWIDTH] IN BLOOD BY AUTOMATED COUNT: 17.7 % (ref 12.3–15.4)
ERYTHROCYTE [DISTWIDTH] IN BLOOD BY AUTOMATED COUNT: 17.8 % (ref 12.3–15.4)
ERYTHROCYTE [DISTWIDTH] IN BLOOD BY AUTOMATED COUNT: 17.8 % (ref 12.3–15.4)
ERYTHROCYTE [DISTWIDTH] IN BLOOD BY AUTOMATED COUNT: 17.9 % (ref 12.3–15.4)
ERYTHROCYTE [DISTWIDTH] IN BLOOD BY AUTOMATED COUNT: 18 % (ref 12.3–15.4)
ERYTHROCYTE [DISTWIDTH] IN BLOOD BY AUTOMATED COUNT: 18.1 % (ref 12.3–15.4)
ERYTHROCYTE [DISTWIDTH] IN BLOOD BY AUTOMATED COUNT: 18.1 % (ref 12.3–15.4)
ERYTHROCYTE [DISTWIDTH] IN BLOOD BY AUTOMATED COUNT: 18.6 % (ref 12.3–15.4)
ERYTHROCYTE [DISTWIDTH] IN BLOOD BY AUTOMATED COUNT: 18.6 % (ref 12.3–15.4)
ERYTHROCYTE [DISTWIDTH] IN BLOOD BY AUTOMATED COUNT: 19.3 % (ref 12.3–15.4)
ERYTHROCYTE [DISTWIDTH] IN BLOOD BY AUTOMATED COUNT: 19.4 % (ref 12.3–15.4)
ERYTHROCYTE [DISTWIDTH] IN BLOOD BY AUTOMATED COUNT: 19.4 % (ref 12.3–15.4)
FLUAV RNA RESP QL NAA+PROBE: NOT DETECTED
FLUAV RNA RESP QL NAA+PROBE: NOT DETECTED
FLUBV RNA RESP QL NAA+PROBE: NOT DETECTED
FLUBV RNA RESP QL NAA+PROBE: NOT DETECTED
GFR SERPL CREATININE-BSD FRML MDRD: 10 ML/MIN/1.73
GFR SERPL CREATININE-BSD FRML MDRD: 101 ML/MIN/1.73
GFR SERPL CREATININE-BSD FRML MDRD: 101 ML/MIN/1.73
GFR SERPL CREATININE-BSD FRML MDRD: 104 ML/MIN/1.73
GFR SERPL CREATININE-BSD FRML MDRD: 105 ML/MIN/1.73
GFR SERPL CREATININE-BSD FRML MDRD: 108 ML/MIN/1.73
GFR SERPL CREATININE-BSD FRML MDRD: 11 ML/MIN/1.73
GFR SERPL CREATININE-BSD FRML MDRD: 11 ML/MIN/1.73
GFR SERPL CREATININE-BSD FRML MDRD: 111 ML/MIN/1.73
GFR SERPL CREATININE-BSD FRML MDRD: 12 ML/MIN/1.73
GFR SERPL CREATININE-BSD FRML MDRD: 120 ML/MIN/1.73
GFR SERPL CREATININE-BSD FRML MDRD: 123 ML/MIN/1.73
GFR SERPL CREATININE-BSD FRML MDRD: 13 ML/MIN/1.73
GFR SERPL CREATININE-BSD FRML MDRD: 14 ML/MIN/1.73
GFR SERPL CREATININE-BSD FRML MDRD: 15 ML/MIN/1.73
GFR SERPL CREATININE-BSD FRML MDRD: 19 ML/MIN/1.73
GFR SERPL CREATININE-BSD FRML MDRD: 26 ML/MIN/1.73
GFR SERPL CREATININE-BSD FRML MDRD: 29 ML/MIN/1.73
GFR SERPL CREATININE-BSD FRML MDRD: 3 ML/MIN/1.73
GFR SERPL CREATININE-BSD FRML MDRD: 32 ML/MIN/1.73
GFR SERPL CREATININE-BSD FRML MDRD: 4 ML/MIN/1.73
GFR SERPL CREATININE-BSD FRML MDRD: 4 ML/MIN/1.73
GFR SERPL CREATININE-BSD FRML MDRD: 42 ML/MIN/1.73
GFR SERPL CREATININE-BSD FRML MDRD: 47 ML/MIN/1.73
GFR SERPL CREATININE-BSD FRML MDRD: 49 ML/MIN/1.73
GFR SERPL CREATININE-BSD FRML MDRD: 5 ML/MIN/1.73
GFR SERPL CREATININE-BSD FRML MDRD: 5 ML/MIN/1.73
GFR SERPL CREATININE-BSD FRML MDRD: 50 ML/MIN/1.73
GFR SERPL CREATININE-BSD FRML MDRD: 58 ML/MIN/1.73
GFR SERPL CREATININE-BSD FRML MDRD: 6 ML/MIN/1.73
GFR SERPL CREATININE-BSD FRML MDRD: 61 ML/MIN/1.73
GFR SERPL CREATININE-BSD FRML MDRD: 62 ML/MIN/1.73
GFR SERPL CREATININE-BSD FRML MDRD: 7 ML/MIN/1.73
GFR SERPL CREATININE-BSD FRML MDRD: 71 ML/MIN/1.73
GFR SERPL CREATININE-BSD FRML MDRD: 73 ML/MIN/1.73
GFR SERPL CREATININE-BSD FRML MDRD: 75 ML/MIN/1.73
GFR SERPL CREATININE-BSD FRML MDRD: 8 ML/MIN/1.73
GFR SERPL CREATININE-BSD FRML MDRD: 83 ML/MIN/1.73
GFR SERPL CREATININE-BSD FRML MDRD: 84 ML/MIN/1.73
GFR SERPL CREATININE-BSD FRML MDRD: 84 ML/MIN/1.73
GFR SERPL CREATININE-BSD FRML MDRD: 86 ML/MIN/1.73
GFR SERPL CREATININE-BSD FRML MDRD: 86 ML/MIN/1.73
GFR SERPL CREATININE-BSD FRML MDRD: 89 ML/MIN/1.73
GFR SERPL CREATININE-BSD FRML MDRD: 91 ML/MIN/1.73
GFR SERPL CREATININE-BSD FRML MDRD: 92 ML/MIN/1.73
GFR SERPL CREATININE-BSD FRML MDRD: 96 ML/MIN/1.73
GFR SERPL CREATININE-BSD FRML MDRD: 96 ML/MIN/1.73
GFR SERPL CREATININE-BSD FRML MDRD: 97 ML/MIN/1.73
GFR SERPL CREATININE-BSD FRML MDRD: 98 ML/MIN/1.73
GFR SERPL CREATININE-BSD FRML MDRD: ABNORMAL ML/MIN/{1.73_M2}
GLOBULIN UR ELPH-MCNC: 3.2 GM/DL
GLOBULIN UR ELPH-MCNC: 3.6 GM/DL
GLOBULIN UR ELPH-MCNC: 3.8 GM/DL
GLOBULIN UR ELPH-MCNC: 4 GM/DL
GLOBULIN UR ELPH-MCNC: 4.2 GM/DL
GLOBULIN UR ELPH-MCNC: 4.6 GM/DL
GLOBULIN UR ELPH-MCNC: 4.8 GM/DL
GLUCOSE BLDC GLUCOMTR-MCNC: 107 MG/DL (ref 70–130)
GLUCOSE BLDC GLUCOMTR-MCNC: 107 MG/DL (ref 70–130)
GLUCOSE BLDC GLUCOMTR-MCNC: 110 MG/DL (ref 70–130)
GLUCOSE BLDC GLUCOMTR-MCNC: 132 MG/DL (ref 70–130)
GLUCOSE BLDC GLUCOMTR-MCNC: 140 MG/DL (ref 70–130)
GLUCOSE BLDC GLUCOMTR-MCNC: 149 MG/DL (ref 70–130)
GLUCOSE BLDC GLUCOMTR-MCNC: 57 MG/DL (ref 70–130)
GLUCOSE BLDC GLUCOMTR-MCNC: 59 MG/DL (ref 70–130)
GLUCOSE BLDC GLUCOMTR-MCNC: 61 MG/DL (ref 70–130)
GLUCOSE BLDC GLUCOMTR-MCNC: 63 MG/DL (ref 70–130)
GLUCOSE BLDC GLUCOMTR-MCNC: 63 MG/DL (ref 70–130)
GLUCOSE BLDC GLUCOMTR-MCNC: 64 MG/DL (ref 70–130)
GLUCOSE BLDC GLUCOMTR-MCNC: 66 MG/DL (ref 70–130)
GLUCOSE BLDC GLUCOMTR-MCNC: 67 MG/DL (ref 70–130)
GLUCOSE BLDC GLUCOMTR-MCNC: 69 MG/DL (ref 70–130)
GLUCOSE BLDC GLUCOMTR-MCNC: 74 MG/DL (ref 70–130)
GLUCOSE BLDC GLUCOMTR-MCNC: 74 MG/DL (ref 70–130)
GLUCOSE BLDC GLUCOMTR-MCNC: 76 MG/DL (ref 70–130)
GLUCOSE BLDC GLUCOMTR-MCNC: 78 MG/DL (ref 70–130)
GLUCOSE BLDC GLUCOMTR-MCNC: 79 MG/DL (ref 70–130)
GLUCOSE BLDC GLUCOMTR-MCNC: 82 MG/DL (ref 70–130)
GLUCOSE BLDC GLUCOMTR-MCNC: 83 MG/DL (ref 70–130)
GLUCOSE BLDC GLUCOMTR-MCNC: 83 MG/DL (ref 70–130)
GLUCOSE BLDC GLUCOMTR-MCNC: 84 MG/DL (ref 70–130)
GLUCOSE SERPL-MCNC: 100 MG/DL (ref 65–99)
GLUCOSE SERPL-MCNC: 101 MG/DL (ref 65–99)
GLUCOSE SERPL-MCNC: 104 MG/DL (ref 65–99)
GLUCOSE SERPL-MCNC: 105 MG/DL (ref 65–99)
GLUCOSE SERPL-MCNC: 121 MG/DL (ref 65–99)
GLUCOSE SERPL-MCNC: 123 MG/DL (ref 65–99)
GLUCOSE SERPL-MCNC: 135 MG/DL (ref 65–99)
GLUCOSE SERPL-MCNC: 67 MG/DL (ref 65–99)
GLUCOSE SERPL-MCNC: 69 MG/DL (ref 65–99)
GLUCOSE SERPL-MCNC: 71 MG/DL (ref 65–99)
GLUCOSE SERPL-MCNC: 73 MG/DL (ref 65–99)
GLUCOSE SERPL-MCNC: 74 MG/DL (ref 65–99)
GLUCOSE SERPL-MCNC: 75 MG/DL (ref 65–99)
GLUCOSE SERPL-MCNC: 77 MG/DL (ref 65–99)
GLUCOSE SERPL-MCNC: 78 MG/DL (ref 65–99)
GLUCOSE SERPL-MCNC: 79 MG/DL (ref 65–99)
GLUCOSE SERPL-MCNC: 80 MG/DL (ref 65–99)
GLUCOSE SERPL-MCNC: 81 MG/DL (ref 65–99)
GLUCOSE SERPL-MCNC: 81 MG/DL (ref 65–99)
GLUCOSE SERPL-MCNC: 82 MG/DL (ref 65–99)
GLUCOSE SERPL-MCNC: 83 MG/DL (ref 65–99)
GLUCOSE SERPL-MCNC: 84 MG/DL (ref 65–99)
GLUCOSE SERPL-MCNC: 85 MG/DL (ref 65–99)
GLUCOSE SERPL-MCNC: 86 MG/DL (ref 65–99)
GLUCOSE SERPL-MCNC: 88 MG/DL (ref 65–99)
GLUCOSE SERPL-MCNC: 89 MG/DL (ref 65–99)
GLUCOSE SERPL-MCNC: 90 MG/DL (ref 65–99)
GLUCOSE SERPL-MCNC: 92 MG/DL (ref 65–99)
GLUCOSE SERPL-MCNC: 93 MG/DL (ref 65–99)
GLUCOSE SERPL-MCNC: 94 MG/DL (ref 65–99)
GLUCOSE SERPL-MCNC: 95 MG/DL (ref 65–99)
GLUCOSE SERPL-MCNC: 95 MG/DL (ref 65–99)
GLUCOSE SERPL-MCNC: 96 MG/DL (ref 65–99)
GLUCOSE SERPL-MCNC: 96 MG/DL (ref 65–99)
GLUCOSE SERPL-MCNC: 97 MG/DL (ref 65–99)
GLUCOSE SERPL-MCNC: 98 MG/DL (ref 65–99)
GLUCOSE SERPL-MCNC: 99 MG/DL (ref 65–99)
HAV IGM SERPL QL IA: ABNORMAL
HBV CORE IGM SERPL QL IA: ABNORMAL
HBV SURFACE AG SERPL QL IA: ABNORMAL
HCO3 BLDA-SCNC: 21 MMOL/L (ref 20–26)
HCO3 BLDA-SCNC: 22.1 MMOL/L (ref 20–26)
HCO3 BLDA-SCNC: 22.2 MMOL/L (ref 20–26)
HCO3 BLDA-SCNC: 22.8 MMOL/L (ref 20–26)
HCO3 BLDA-SCNC: 23.1 MMOL/L (ref 20–26)
HCO3 BLDA-SCNC: 25.2 MMOL/L (ref 20–26)
HCO3 BLDA-SCNC: 27.9 MMOL/L (ref 20–26)
HCT VFR BLD AUTO: 28 % (ref 37.5–51)
HCT VFR BLD AUTO: 28.2 % (ref 37.5–51)
HCT VFR BLD AUTO: 29 % (ref 37.5–51)
HCT VFR BLD AUTO: 29 % (ref 37.5–51)
HCT VFR BLD AUTO: 29.4 % (ref 37.5–51)
HCT VFR BLD AUTO: 29.5 % (ref 37.5–51)
HCT VFR BLD AUTO: 29.5 % (ref 37.5–51)
HCT VFR BLD AUTO: 29.9 % (ref 37.5–51)
HCT VFR BLD AUTO: 30.8 % (ref 37.5–51)
HCT VFR BLD AUTO: 30.9 % (ref 37.5–51)
HCT VFR BLD AUTO: 32.4 % (ref 37.5–51)
HCT VFR BLD AUTO: 32.6 % (ref 37.5–51)
HCT VFR BLD AUTO: 33.1 % (ref 37.5–51)
HCT VFR BLD AUTO: 33.6 % (ref 37.5–51)
HCT VFR BLD AUTO: 34.4 % (ref 37.5–51)
HCT VFR BLD AUTO: 34.7 % (ref 37.5–51)
HCT VFR BLD AUTO: 35.3 % (ref 37.5–51)
HCT VFR BLD AUTO: 35.3 % (ref 37.5–51)
HCT VFR BLD AUTO: 36.1 % (ref 37.5–51)
HCT VFR BLD AUTO: 36.2 % (ref 37.5–51)
HCT VFR BLD AUTO: 36.8 % (ref 37.5–51)
HCT VFR BLD AUTO: 37 % (ref 37.5–51)
HCT VFR BLD AUTO: 38 % (ref 37.5–51)
HCT VFR BLD AUTO: 38.7 % (ref 37.5–51)
HCT VFR BLD AUTO: 39.7 % (ref 37.5–51)
HCT VFR BLD CALC: 29.3 % (ref 38–51)
HCT VFR BLD CALC: 30.8 % (ref 38–51)
HCT VFR BLD CALC: 31.2 % (ref 38–51)
HCT VFR BLD CALC: 31.6 % (ref 38–51)
HCT VFR BLD CALC: 34.5 % (ref 38–51)
HCT VFR BLD CALC: 37.1 % (ref 38–51)
HCT VFR BLD CALC: 38.6 % (ref 38–51)
HCV AB SER DONR QL: REACTIVE
HDLC SERPL-MCNC: 72 MG/DL (ref 40–60)
HGB BLD-MCNC: 10.1 G/DL (ref 13–17.7)
HGB BLD-MCNC: 10.1 G/DL (ref 13–17.7)
HGB BLD-MCNC: 10.3 G/DL (ref 13–17.7)
HGB BLD-MCNC: 10.5 G/DL (ref 13–17.7)
HGB BLD-MCNC: 10.6 G/DL (ref 13–17.7)
HGB BLD-MCNC: 10.6 G/DL (ref 13–17.7)
HGB BLD-MCNC: 10.7 G/DL (ref 13–17.7)
HGB BLD-MCNC: 10.7 G/DL (ref 13–17.7)
HGB BLD-MCNC: 10.8 G/DL (ref 13–17.7)
HGB BLD-MCNC: 10.9 G/DL (ref 13–17.7)
HGB BLD-MCNC: 10.9 G/DL (ref 13–17.7)
HGB BLD-MCNC: 11.4 G/DL (ref 13–17.7)
HGB BLD-MCNC: 11.6 G/DL (ref 13–17.7)
HGB BLD-MCNC: 8.5 G/DL (ref 13–17.7)
HGB BLD-MCNC: 8.9 G/DL (ref 13–17.7)
HGB BLD-MCNC: 9 G/DL (ref 13–17.7)
HGB BLD-MCNC: 9.1 G/DL (ref 13–17.7)
HGB BLD-MCNC: 9.2 G/DL (ref 13–17.7)
HGB BLD-MCNC: 9.3 G/DL (ref 13–17.7)
HGB BLD-MCNC: 9.3 G/DL (ref 13–17.7)
HGB BLD-MCNC: 9.5 G/DL (ref 13–17.7)
HGB BLD-MCNC: 9.6 G/DL (ref 13–17.7)
HGB BLD-MCNC: 9.7 G/DL (ref 13–17.7)
HGB BLD-MCNC: 9.7 G/DL (ref 13–17.7)
HGB BLD-MCNC: 9.9 G/DL (ref 13–17.7)
HGB BLDA-MCNC: 10.1 G/DL (ref 13.5–17.5)
HGB BLDA-MCNC: 10.2 G/DL (ref 13.5–17.5)
HGB BLDA-MCNC: 10.3 G/DL (ref 13.5–17.5)
HGB BLDA-MCNC: 11.2 G/DL (ref 13.5–17.5)
HGB BLDA-MCNC: 12.1 G/DL (ref 13.5–17.5)
HGB BLDA-MCNC: 12.6 G/DL (ref 13.5–17.5)
HGB BLDA-MCNC: 9.6 G/DL (ref 13.5–17.5)
HIV1+2 AB SER QL: NORMAL
HOLD SPECIMEN: NORMAL
HOLD SPECIMEN: NORMAL
IMM GRANULOCYTES # BLD AUTO: 0.01 10*3/MM3 (ref 0–0.05)
IMM GRANULOCYTES # BLD AUTO: 0.02 10*3/MM3 (ref 0–0.05)
IMM GRANULOCYTES # BLD AUTO: 0.04 10*3/MM3 (ref 0–0.05)
IMM GRANULOCYTES NFR BLD AUTO: 0.2 % (ref 0–0.5)
IMM GRANULOCYTES NFR BLD AUTO: 0.3 % (ref 0–0.5)
IMM GRANULOCYTES NFR BLD AUTO: 0.4 % (ref 0–0.5)
IMM GRANULOCYTES NFR BLD AUTO: 0.5 % (ref 0–0.5)
IMM GRANULOCYTES NFR BLD AUTO: 0.5 % (ref 0–0.5)
IMM GRANULOCYTES NFR BLD AUTO: 0.7 % (ref 0–0.5)
IMM GRANULOCYTES NFR BLD AUTO: 1.2 % (ref 0–0.5)
INHALED O2 CONCENTRATION: 100 %
INHALED O2 CONCENTRATION: 50 %
INHALED O2 CONCENTRATION: 80 %
INHALED O2 CONCENTRATION: 80 %
INR PPP: 1.33 (ref 0.85–1.16)
INR PPP: 1.36 (ref 0.85–1.16)
INR PPP: 1.36 (ref 0.85–1.16)
IPAP: 0
LDLC SERPL CALC-MCNC: 24 MG/DL (ref 0–100)
LDLC/HDLC SERPL: 0.38 {RATIO}
LEFT ATRIUM VOLUME INDEX: 20.9 ML/M^2
LEFT ATRIUM VOLUME INDEX: 31 ML/M2
LEFT ATRIUM VOLUME: 36.5 ML
LIPASE SERPL-CCNC: 36 U/L (ref 13–60)
LYMPHOCYTES # BLD AUTO: 0.43 10*3/MM3 (ref 0.7–3.1)
LYMPHOCYTES # BLD AUTO: 0.44 10*3/MM3 (ref 0.7–3.1)
LYMPHOCYTES # BLD AUTO: 0.47 10*3/MM3 (ref 0.7–3.1)
LYMPHOCYTES # BLD AUTO: 0.48 10*3/MM3 (ref 0.7–3.1)
LYMPHOCYTES # BLD AUTO: 0.5 10*3/MM3 (ref 0.7–3.1)
LYMPHOCYTES # BLD AUTO: 0.52 10*3/MM3 (ref 0.7–3.1)
LYMPHOCYTES # BLD AUTO: 0.53 10*3/MM3 (ref 0.7–3.1)
LYMPHOCYTES # BLD AUTO: 0.54 10*3/MM3 (ref 0.7–3.1)
LYMPHOCYTES # BLD AUTO: 0.59 10*3/MM3 (ref 0.7–3.1)
LYMPHOCYTES # BLD AUTO: 0.73 10*3/MM3 (ref 0.7–3.1)
LYMPHOCYTES # BLD AUTO: 1.48 10*3/MM3 (ref 0.7–3.1)
LYMPHOCYTES NFR BLD AUTO: 10.2 % (ref 19.6–45.3)
LYMPHOCYTES NFR BLD AUTO: 12.7 % (ref 19.6–45.3)
LYMPHOCYTES NFR BLD AUTO: 13.5 % (ref 19.6–45.3)
LYMPHOCYTES NFR BLD AUTO: 13.8 % (ref 19.6–45.3)
LYMPHOCYTES NFR BLD AUTO: 14.8 % (ref 19.6–45.3)
LYMPHOCYTES NFR BLD AUTO: 16.2 % (ref 19.6–45.3)
LYMPHOCYTES NFR BLD AUTO: 16.5 % (ref 19.6–45.3)
LYMPHOCYTES NFR BLD AUTO: 16.8 % (ref 19.6–45.3)
LYMPHOCYTES NFR BLD AUTO: 17.8 % (ref 19.6–45.3)
LYMPHOCYTES NFR BLD AUTO: 23 % (ref 19.6–45.3)
LYMPHOCYTES NFR BLD AUTO: 27.7 % (ref 19.6–45.3)
Lab: ABNORMAL
Lab: ABNORMAL
MAGNESIUM SERPL-MCNC: 1.9 MG/DL (ref 1.6–2.6)
MAGNESIUM SERPL-MCNC: 1.9 MG/DL (ref 1.6–2.6)
MAGNESIUM SERPL-MCNC: 2.1 MG/DL (ref 1.6–2.6)
MAGNESIUM SERPL-MCNC: 2.1 MG/DL (ref 1.6–2.6)
MAGNESIUM SERPL-MCNC: 2.2 MG/DL (ref 1.6–2.6)
MAGNESIUM SERPL-MCNC: 2.3 MG/DL (ref 1.6–2.6)
MAGNESIUM SERPL-MCNC: 2.4 MG/DL (ref 1.6–2.6)
MAGNESIUM SERPL-MCNC: 2.5 MG/DL (ref 1.6–2.6)
MAGNESIUM SERPL-MCNC: 2.7 MG/DL (ref 1.6–2.6)
MAGNESIUM SERPL-MCNC: 2.7 MG/DL (ref 1.6–2.6)
MAXIMAL PREDICTED HEART RATE: 172 BPM
MCH RBC QN AUTO: 27.7 PG (ref 26.6–33)
MCH RBC QN AUTO: 27.7 PG (ref 26.6–33)
MCH RBC QN AUTO: 27.8 PG (ref 26.6–33)
MCH RBC QN AUTO: 27.9 PG (ref 26.6–33)
MCH RBC QN AUTO: 27.9 PG (ref 26.6–33)
MCH RBC QN AUTO: 28 PG (ref 26.6–33)
MCH RBC QN AUTO: 28.1 PG (ref 26.6–33)
MCH RBC QN AUTO: 28.2 PG (ref 26.6–33)
MCH RBC QN AUTO: 28.3 PG (ref 26.6–33)
MCH RBC QN AUTO: 28.4 PG (ref 26.6–33)
MCH RBC QN AUTO: 28.5 PG (ref 26.6–33)
MCH RBC QN AUTO: 28.5 PG (ref 26.6–33)
MCH RBC QN AUTO: 28.8 PG (ref 26.6–33)
MCH RBC QN AUTO: 28.8 PG (ref 26.6–33)
MCHC RBC AUTO-ENTMCNC: 28.4 G/DL (ref 31.5–35.7)
MCHC RBC AUTO-ENTMCNC: 28.5 G/DL (ref 31.5–35.7)
MCHC RBC AUTO-ENTMCNC: 28.7 G/DL (ref 31.5–35.7)
MCHC RBC AUTO-ENTMCNC: 28.7 G/DL (ref 31.5–35.7)
MCHC RBC AUTO-ENTMCNC: 28.8 G/DL (ref 31.5–35.7)
MCHC RBC AUTO-ENTMCNC: 29.1 G/DL (ref 31.5–35.7)
MCHC RBC AUTO-ENTMCNC: 29.8 G/DL (ref 31.5–35.7)
MCHC RBC AUTO-ENTMCNC: 30 G/DL (ref 31.5–35.7)
MCHC RBC AUTO-ENTMCNC: 30.2 G/DL (ref 31.5–35.7)
MCHC RBC AUTO-ENTMCNC: 30.3 G/DL (ref 31.5–35.7)
MCHC RBC AUTO-ENTMCNC: 30.4 G/DL (ref 31.5–35.7)
MCHC RBC AUTO-ENTMCNC: 30.5 G/DL (ref 31.5–35.7)
MCHC RBC AUTO-ENTMCNC: 30.6 G/DL (ref 31.5–35.7)
MCHC RBC AUTO-ENTMCNC: 30.6 G/DL (ref 31.5–35.7)
MCHC RBC AUTO-ENTMCNC: 30.8 G/DL (ref 31.5–35.7)
MCHC RBC AUTO-ENTMCNC: 31 G/DL (ref 31.5–35.7)
MCHC RBC AUTO-ENTMCNC: 31.1 G/DL (ref 31.5–35.7)
MCHC RBC AUTO-ENTMCNC: 31.2 G/DL (ref 31.5–35.7)
MCHC RBC AUTO-ENTMCNC: 31.2 G/DL (ref 31.5–35.7)
MCHC RBC AUTO-ENTMCNC: 31.4 G/DL (ref 31.5–35.7)
MCHC RBC AUTO-ENTMCNC: 31.5 G/DL (ref 31.5–35.7)
MCHC RBC AUTO-ENTMCNC: 31.6 G/DL (ref 31.5–35.7)
MCHC RBC AUTO-ENTMCNC: 31.6 G/DL (ref 31.5–35.7)
MCHC RBC AUTO-ENTMCNC: 31.8 G/DL (ref 31.5–35.7)
MCHC RBC AUTO-ENTMCNC: 32.1 G/DL (ref 31.5–35.7)
MCV RBC AUTO: 88.7 FL (ref 79–97)
MCV RBC AUTO: 89.1 FL (ref 79–97)
MCV RBC AUTO: 89.1 FL (ref 79–97)
MCV RBC AUTO: 89.2 FL (ref 79–97)
MCV RBC AUTO: 89.8 FL (ref 79–97)
MCV RBC AUTO: 89.8 FL (ref 79–97)
MCV RBC AUTO: 89.9 FL (ref 79–97)
MCV RBC AUTO: 90.1 FL (ref 79–97)
MCV RBC AUTO: 90.6 FL (ref 79–97)
MCV RBC AUTO: 90.8 FL (ref 79–97)
MCV RBC AUTO: 90.8 FL (ref 79–97)
MCV RBC AUTO: 91.2 FL (ref 79–97)
MCV RBC AUTO: 92.2 FL (ref 79–97)
MCV RBC AUTO: 92.2 FL (ref 79–97)
MCV RBC AUTO: 92.8 FL (ref 79–97)
MCV RBC AUTO: 92.9 FL (ref 79–97)
MCV RBC AUTO: 93.3 FL (ref 79–97)
MCV RBC AUTO: 94 FL (ref 79–97)
MCV RBC AUTO: 95 FL (ref 79–97)
MCV RBC AUTO: 96.1 FL (ref 79–97)
MCV RBC AUTO: 97.4 FL (ref 79–97)
MCV RBC AUTO: 97.8 FL (ref 79–97)
MCV RBC AUTO: 97.8 FL (ref 79–97)
MCV RBC AUTO: 98.7 FL (ref 79–97)
MCV RBC AUTO: 98.9 FL (ref 79–97)
METHGB BLD QL: 0.4 % (ref 0–1.5)
METHGB BLD QL: 0.5 % (ref 0–1.5)
METHGB BLD QL: 0.7 % (ref 0–1.5)
METHGB BLD QL: 0.8 % (ref 0–1.5)
MODALITY: ABNORMAL
MONOCYTES # BLD AUTO: 0.14 10*3/MM3 (ref 0.1–0.9)
MONOCYTES # BLD AUTO: 0.22 10*3/MM3 (ref 0.1–0.9)
MONOCYTES # BLD AUTO: 0.28 10*3/MM3 (ref 0.1–0.9)
MONOCYTES # BLD AUTO: 0.29 10*3/MM3 (ref 0.1–0.9)
MONOCYTES # BLD AUTO: 0.31 10*3/MM3 (ref 0.1–0.9)
MONOCYTES # BLD AUTO: 0.38 10*3/MM3 (ref 0.1–0.9)
MONOCYTES # BLD AUTO: 0.45 10*3/MM3 (ref 0.1–0.9)
MONOCYTES # BLD AUTO: 0.48 10*3/MM3 (ref 0.1–0.9)
MONOCYTES # BLD AUTO: 0.52 10*3/MM3 (ref 0.1–0.9)
MONOCYTES # BLD AUTO: 0.58 10*3/MM3 (ref 0.1–0.9)
MONOCYTES # BLD AUTO: 0.62 10*3/MM3 (ref 0.1–0.9)
MONOCYTES NFR BLD AUTO: 10 % (ref 5–12)
MONOCYTES NFR BLD AUTO: 10.8 % (ref 5–12)
MONOCYTES NFR BLD AUTO: 11.6 % (ref 5–12)
MONOCYTES NFR BLD AUTO: 12.6 % (ref 5–12)
MONOCYTES NFR BLD AUTO: 13.3 % (ref 5–12)
MONOCYTES NFR BLD AUTO: 15.3 % (ref 5–12)
MONOCYTES NFR BLD AUTO: 15.7 % (ref 5–12)
MONOCYTES NFR BLD AUTO: 3.8 % (ref 5–12)
MONOCYTES NFR BLD AUTO: 6.7 % (ref 5–12)
MONOCYTES NFR BLD AUTO: 8.5 % (ref 5–12)
MONOCYTES NFR BLD AUTO: 9.7 % (ref 5–12)
NEUTROPHILS NFR BLD AUTO: 1.12 10*3/MM3 (ref 1.7–7)
NEUTROPHILS NFR BLD AUTO: 1.78 10*3/MM3 (ref 1.7–7)
NEUTROPHILS NFR BLD AUTO: 1.81 10*3/MM3 (ref 1.7–7)
NEUTROPHILS NFR BLD AUTO: 1.9 10*3/MM3 (ref 1.7–7)
NEUTROPHILS NFR BLD AUTO: 2.05 10*3/MM3 (ref 1.7–7)
NEUTROPHILS NFR BLD AUTO: 2.19 10*3/MM3 (ref 1.7–7)
NEUTROPHILS NFR BLD AUTO: 2.27 10*3/MM3 (ref 1.7–7)
NEUTROPHILS NFR BLD AUTO: 2.91 10*3/MM3 (ref 1.7–7)
NEUTROPHILS NFR BLD AUTO: 2.95 10*3/MM3 (ref 1.7–7)
NEUTROPHILS NFR BLD AUTO: 3.07 10*3/MM3 (ref 1.7–7)
NEUTROPHILS NFR BLD AUTO: 4.28 10*3/MM3 (ref 1.7–7)
NEUTROPHILS NFR BLD AUTO: 48.8 % (ref 42.7–76)
NEUTROPHILS NFR BLD AUTO: 55.3 % (ref 42.7–76)
NEUTROPHILS NFR BLD AUTO: 57.2 % (ref 42.7–76)
NEUTROPHILS NFR BLD AUTO: 60.1 % (ref 42.7–76)
NEUTROPHILS NFR BLD AUTO: 62.5 % (ref 42.7–76)
NEUTROPHILS NFR BLD AUTO: 62.5 % (ref 42.7–76)
NEUTROPHILS NFR BLD AUTO: 65.8 % (ref 42.7–76)
NEUTROPHILS NFR BLD AUTO: 66.4 % (ref 42.7–76)
NEUTROPHILS NFR BLD AUTO: 67.2 % (ref 42.7–76)
NEUTROPHILS NFR BLD AUTO: 80.5 % (ref 42.7–76)
NEUTROPHILS NFR BLD AUTO: 82.7 % (ref 42.7–76)
NOTE: ABNORMAL
NOTIFIED BY: ABNORMAL
NOTIFIED BY: ABNORMAL
NOTIFIED WHO: ABNORMAL
NOTIFIED WHO: ABNORMAL
NRBC BLD AUTO-RTO: 0 /100 WBC (ref 0–0.2)
NRBC BLD AUTO-RTO: 0.5 /100 WBC (ref 0–0.2)
NRBC BLD AUTO-RTO: 0.6 /100 WBC (ref 0–0.2)
NRBC BLD AUTO-RTO: 0.6 /100 WBC (ref 0–0.2)
NRBC BLD AUTO-RTO: 0.7 /100 WBC (ref 0–0.2)
NRBC BLD AUTO-RTO: 0.9 /100 WBC (ref 0–0.2)
NT-PROBNP SERPL-MCNC: 1890 PG/ML (ref 0–450)
OXYCODONE SERPLBLD CFM-MCNC: NEGATIVE NG/ML
OXYCODONE+OXYMORPHONE SERPLBLD CFM-IMP: NEGATIVE
OXYHGB MFR BLDV: 62.6 % (ref 94–99)
OXYHGB MFR BLDV: 69.5 % (ref 94–99)
OXYHGB MFR BLDV: 79.9 % (ref 94–99)
OXYHGB MFR BLDV: 83.2 % (ref 94–99)
OXYHGB MFR BLDV: 94.5 % (ref 94–99)
OXYHGB MFR BLDV: 95.3 % (ref 94–99)
OXYHGB MFR BLDV: 99 % (ref 94–99)
OXYMORPHONE SERPLBLD CFM-MCNC: NEGATIVE NG/ML
PAW @ PEAK INSP FLOW SETTING VENT: 0 CMH2O
PCO2 BLDA: 27.7 MM HG (ref 35–45)
PCO2 BLDA: 33.1 MM HG (ref 35–45)
PCO2 BLDA: 34 MM HG (ref 35–45)
PCO2 BLDA: 40.9 MM HG (ref 35–45)
PCO2 BLDA: 48.2 MM HG (ref 35–45)
PCO2 BLDA: 48.5 MM HG (ref 35–45)
PCO2 BLDA: 67.7 MM HG (ref 35–45)
PCO2 TEMP ADJ BLD: 27.7 MM HG (ref 35–48)
PCO2 TEMP ADJ BLD: 33.1 MM HG (ref 35–48)
PCO2 TEMP ADJ BLD: 34 MM HG (ref 35–48)
PCO2 TEMP ADJ BLD: 40.9 MM HG (ref 35–48)
PCO2 TEMP ADJ BLD: 48.2 MM HG (ref 35–48)
PCO2 TEMP ADJ BLD: 48.5 MM HG (ref 35–48)
PCO2 TEMP ADJ BLD: 67.7 MM HG (ref 35–48)
PEEP RESPIRATORY: 8 CM[H2O]
PEEP RESPIRATORY: 8 CM[H2O]
PH BLDA: 7.22 PH UNITS (ref 7.35–7.45)
PH BLDA: 7.29 PH UNITS (ref 7.35–7.45)
PH BLDA: 7.32 PH UNITS (ref 7.35–7.45)
PH BLDA: 7.33 PH UNITS (ref 7.35–7.45)
PH BLDA: 7.43 PH UNITS (ref 7.35–7.45)
PH BLDA: 7.44 PH UNITS (ref 7.35–7.45)
PH BLDA: 7.51 PH UNITS (ref 7.35–7.45)
PH, TEMP CORRECTED: 7.22 PH UNITS
PH, TEMP CORRECTED: 7.29 PH UNITS
PH, TEMP CORRECTED: 7.32 PH UNITS
PH, TEMP CORRECTED: 7.33 PH UNITS
PH, TEMP CORRECTED: 7.43 PH UNITS
PH, TEMP CORRECTED: 7.44 PH UNITS
PH, TEMP CORRECTED: 7.51 PH UNITS
PHOSPHATE SERPL-MCNC: 3.6 MG/DL (ref 2.5–4.5)
PHOSPHATE SERPL-MCNC: 3.6 MG/DL (ref 2.5–4.5)
PHOSPHATE SERPL-MCNC: 3.7 MG/DL (ref 2.5–4.5)
PHOSPHATE SERPL-MCNC: 3.8 MG/DL (ref 2.5–4.5)
PHOSPHATE SERPL-MCNC: 3.9 MG/DL (ref 2.5–4.5)
PHOSPHATE SERPL-MCNC: 4 MG/DL (ref 2.5–4.5)
PHOSPHATE SERPL-MCNC: 4.1 MG/DL (ref 2.5–4.5)
PHOSPHATE SERPL-MCNC: 4.2 MG/DL (ref 2.5–4.5)
PHOSPHATE SERPL-MCNC: 4.3 MG/DL (ref 2.5–4.5)
PHOSPHATE SERPL-MCNC: 4.3 MG/DL (ref 2.5–4.5)
PHOSPHATE SERPL-MCNC: 4.4 MG/DL (ref 2.5–4.5)
PHOSPHATE SERPL-MCNC: 4.6 MG/DL (ref 2.5–4.5)
PHOSPHATE SERPL-MCNC: 4.9 MG/DL (ref 2.5–4.5)
PHOSPHATE SERPL-MCNC: 5 MG/DL (ref 2.5–4.5)
PHOSPHATE SERPL-MCNC: 5.2 MG/DL (ref 2.5–4.5)
PHOSPHATE SERPL-MCNC: 5.3 MG/DL (ref 2.5–4.5)
PHOSPHATE SERPL-MCNC: 5.3 MG/DL (ref 2.5–4.5)
PHOSPHATE SERPL-MCNC: 5.7 MG/DL (ref 2.5–4.5)
PHOSPHATE SERPL-MCNC: 5.8 MG/DL (ref 2.5–4.5)
PHOSPHATE SERPL-MCNC: 6 MG/DL (ref 2.5–4.5)
PHOSPHATE SERPL-MCNC: 6.2 MG/DL (ref 2.5–4.5)
PHOSPHATE SERPL-MCNC: 6.5 MG/DL (ref 2.5–4.5)
PHOSPHATE SERPL-MCNC: 6.8 MG/DL (ref 2.5–4.5)
PHOSPHATE SERPL-MCNC: 7.9 MG/DL (ref 2.5–4.5)
PHOSPHATE SERPL-MCNC: 9 MG/DL (ref 2.5–4.5)
PLATELET # BLD AUTO: 104 10*3/MM3 (ref 140–450)
PLATELET # BLD AUTO: 105 10*3/MM3 (ref 140–450)
PLATELET # BLD AUTO: 110 10*3/MM3 (ref 140–450)
PLATELET # BLD AUTO: 110 10*3/MM3 (ref 140–450)
PLATELET # BLD AUTO: 112 10*3/MM3 (ref 140–450)
PLATELET # BLD AUTO: 119 10*3/MM3 (ref 140–450)
PLATELET # BLD AUTO: 119 10*3/MM3 (ref 140–450)
PLATELET # BLD AUTO: 120 10*3/MM3 (ref 140–450)
PLATELET # BLD AUTO: 125 10*3/MM3 (ref 140–450)
PLATELET # BLD AUTO: 126 10*3/MM3 (ref 140–450)
PLATELET # BLD AUTO: 127 10*3/MM3 (ref 140–450)
PLATELET # BLD AUTO: 128 10*3/MM3 (ref 140–450)
PLATELET # BLD AUTO: 129 10*3/MM3 (ref 140–450)
PLATELET # BLD AUTO: 129 10*3/MM3 (ref 140–450)
PLATELET # BLD AUTO: 133 10*3/MM3 (ref 140–450)
PLATELET # BLD AUTO: 153 10*3/MM3 (ref 140–450)
PLATELET # BLD AUTO: 168 10*3/MM3 (ref 140–450)
PLATELET # BLD AUTO: 176 10*3/MM3 (ref 140–450)
PLATELET # BLD AUTO: 181 10*3/MM3 (ref 140–450)
PLATELET # BLD AUTO: 185 10*3/MM3 (ref 140–450)
PLATELET # BLD AUTO: 190 10*3/MM3 (ref 140–450)
PLATELET # BLD AUTO: 251 10*3/MM3 (ref 140–450)
PLATELET # BLD AUTO: 95 10*3/MM3 (ref 140–450)
PLATELET # BLD AUTO: 96 10*3/MM3 (ref 140–450)
PLATELET # BLD AUTO: 98 10*3/MM3 (ref 140–450)
PMV BLD AUTO: 10.2 FL (ref 6–12)
PMV BLD AUTO: 10.6 FL (ref 6–12)
PMV BLD AUTO: 10.7 FL (ref 6–12)
PMV BLD AUTO: 10.8 FL (ref 6–12)
PMV BLD AUTO: 10.9 FL (ref 6–12)
PMV BLD AUTO: 10.9 FL (ref 6–12)
PMV BLD AUTO: 11 FL (ref 6–12)
PMV BLD AUTO: 11 FL (ref 6–12)
PMV BLD AUTO: 11.1 FL (ref 6–12)
PMV BLD AUTO: 11.1 FL (ref 6–12)
PMV BLD AUTO: 11.3 FL (ref 6–12)
PMV BLD AUTO: 11.3 FL (ref 6–12)
PMV BLD AUTO: 11.6 FL (ref 6–12)
PMV BLD AUTO: 11.7 FL (ref 6–12)
PMV BLD AUTO: 11.8 FL (ref 6–12)
PMV BLD AUTO: 12 FL (ref 6–12)
PMV BLD AUTO: 12.1 FL (ref 6–12)
PMV BLD AUTO: 12.1 FL (ref 6–12)
PMV BLD AUTO: 12.4 FL (ref 6–12)
PO2 BLDA: 253 MM HG (ref 83–108)
PO2 BLDA: 38.9 MM HG (ref 83–108)
PO2 BLDA: 43.7 MM HG (ref 83–108)
PO2 BLDA: 47.3 MM HG (ref 83–108)
PO2 BLDA: 63.6 MM HG (ref 83–108)
PO2 BLDA: 82 MM HG (ref 83–108)
PO2 BLDA: 97.5 MM HG (ref 83–108)
PO2 TEMP ADJ BLD: 253 MM HG (ref 83–108)
PO2 TEMP ADJ BLD: 38.9 MM HG (ref 83–108)
PO2 TEMP ADJ BLD: 43.7 MM HG (ref 83–108)
PO2 TEMP ADJ BLD: 47.3 MM HG (ref 83–108)
PO2 TEMP ADJ BLD: 63.6 MM HG (ref 83–108)
PO2 TEMP ADJ BLD: 82 MM HG (ref 83–108)
PO2 TEMP ADJ BLD: 97.5 MM HG (ref 83–108)
POTASSIUM SERPL-SCNC: 3.8 MMOL/L (ref 3.5–5.2)
POTASSIUM SERPL-SCNC: 3.8 MMOL/L (ref 3.5–5.2)
POTASSIUM SERPL-SCNC: 3.9 MMOL/L (ref 3.5–5.2)
POTASSIUM SERPL-SCNC: 4 MMOL/L (ref 3.5–5.2)
POTASSIUM SERPL-SCNC: 4.1 MMOL/L (ref 3.5–5.2)
POTASSIUM SERPL-SCNC: 4.2 MMOL/L (ref 3.5–5.2)
POTASSIUM SERPL-SCNC: 4.3 MMOL/L (ref 3.5–5.2)
POTASSIUM SERPL-SCNC: 4.4 MMOL/L (ref 3.5–5.2)
POTASSIUM SERPL-SCNC: 4.5 MMOL/L (ref 3.5–5.2)
POTASSIUM SERPL-SCNC: 4.6 MMOL/L (ref 3.5–5.2)
POTASSIUM SERPL-SCNC: 4.7 MMOL/L (ref 3.5–5.2)
POTASSIUM SERPL-SCNC: 5 MMOL/L (ref 3.5–5.2)
POTASSIUM SERPL-SCNC: 5 MMOL/L (ref 3.5–5.2)
POTASSIUM SERPL-SCNC: 5.1 MMOL/L (ref 3.5–5.2)
POTASSIUM SERPL-SCNC: 5.2 MMOL/L (ref 3.5–5.2)
POTASSIUM SERPL-SCNC: 5.3 MMOL/L (ref 3.5–5.2)
POTASSIUM SERPL-SCNC: 5.4 MMOL/L (ref 3.5–5.2)
POTASSIUM SERPL-SCNC: 5.5 MMOL/L (ref 3.5–5.2)
POTASSIUM SERPL-SCNC: 5.7 MMOL/L (ref 3.5–5.2)
POTASSIUM SERPL-SCNC: 6 MMOL/L (ref 3.5–5.2)
POTASSIUM SERPL-SCNC: 6.1 MMOL/L (ref 3.5–5.2)
PROCALCITONIN SERPL-MCNC: 0.97 NG/ML (ref 0–0.25)
PROCALCITONIN SERPL-MCNC: 1.15 NG/ML (ref 0–0.25)
PROCALCITONIN SERPL-MCNC: 2.54 NG/ML (ref 0–0.25)
PROT SERPL-MCNC: 6.6 G/DL (ref 6–8.5)
PROT SERPL-MCNC: 7.3 G/DL (ref 6–8.5)
PROT SERPL-MCNC: 7.5 G/DL (ref 6–8.5)
PROT SERPL-MCNC: 7.7 G/DL (ref 6–8.5)
PROT SERPL-MCNC: 7.9 G/DL (ref 6–8.5)
PROT SERPL-MCNC: 8.4 G/DL (ref 6–8.5)
PROT SERPL-MCNC: 8.5 G/DL (ref 6–8.5)
PROT SERPL-MCNC: 9.2 G/DL (ref 6–8.5)
PROTHROMBIN TIME: 16 SECONDS (ref 11.4–14.4)
PROTHROMBIN TIME: 16.3 SECONDS (ref 11.4–14.4)
PROTHROMBIN TIME: 16.3 SECONDS (ref 11.4–14.4)
QT INTERVAL: 362 MS
QT INTERVAL: 374 MS
QTC INTERVAL: 445 MS
QTC INTERVAL: 450 MS
RBC # BLD AUTO: 2.98 10*6/MM3 (ref 4.14–5.8)
RBC # BLD AUTO: 3.16 10*6/MM3 (ref 4.14–5.8)
RBC # BLD AUTO: 3.2 10*6/MM3 (ref 4.14–5.8)
RBC # BLD AUTO: 3.22 10*6/MM3 (ref 4.14–5.8)
RBC # BLD AUTO: 3.23 10*6/MM3 (ref 4.14–5.8)
RBC # BLD AUTO: 3.25 10*6/MM3 (ref 4.14–5.8)
RBC # BLD AUTO: 3.27 10*6/MM3 (ref 4.14–5.8)
RBC # BLD AUTO: 3.37 10*6/MM3 (ref 4.14–5.8)
RBC # BLD AUTO: 3.4 10*6/MM3 (ref 4.14–5.8)
RBC # BLD AUTO: 3.43 10*6/MM3 (ref 4.14–5.8)
RBC # BLD AUTO: 3.44 10*6/MM3 (ref 4.14–5.8)
RBC # BLD AUTO: 3.57 10*6/MM3 (ref 4.14–5.8)
RBC # BLD AUTO: 3.59 10*6/MM3 (ref 4.14–5.8)
RBC # BLD AUTO: 3.61 10*6/MM3 (ref 4.14–5.8)
RBC # BLD AUTO: 3.7 10*6/MM3 (ref 4.14–5.8)
RBC # BLD AUTO: 3.72 10*6/MM3 (ref 4.14–5.8)
RBC # BLD AUTO: 3.75 10*6/MM3 (ref 4.14–5.8)
RBC # BLD AUTO: 3.77 10*6/MM3 (ref 4.14–5.8)
RBC # BLD AUTO: 3.8 10*6/MM3 (ref 4.14–5.8)
RBC # BLD AUTO: 3.86 10*6/MM3 (ref 4.14–5.8)
RBC # BLD AUTO: 3.87 10*6/MM3 (ref 4.14–5.8)
RBC # BLD AUTO: 3.89 10*6/MM3 (ref 4.14–5.8)
RBC # BLD AUTO: 3.9 10*6/MM3 (ref 4.14–5.8)
RBC # BLD AUTO: 4.06 10*6/MM3 (ref 4.14–5.8)
RBC # BLD AUTO: 4.15 10*6/MM3 (ref 4.14–5.8)
SARS-COV-2 RNA RESP QL NAA+PROBE: NOT DETECTED
SARS-COV-2 RNA RESP QL NAA+PROBE: NOT DETECTED
SODIUM SERPL-SCNC: 133 MMOL/L (ref 136–145)
SODIUM SERPL-SCNC: 134 MMOL/L (ref 136–145)
SODIUM SERPL-SCNC: 134 MMOL/L (ref 136–145)
SODIUM SERPL-SCNC: 135 MMOL/L (ref 136–145)
SODIUM SERPL-SCNC: 136 MMOL/L (ref 136–145)
SODIUM SERPL-SCNC: 137 MMOL/L (ref 136–145)
SODIUM SERPL-SCNC: 138 MMOL/L (ref 136–145)
SODIUM SERPL-SCNC: 139 MMOL/L (ref 136–145)
SODIUM SERPL-SCNC: 140 MMOL/L (ref 136–145)
SODIUM SERPL-SCNC: 141 MMOL/L (ref 136–145)
SODIUM SERPL-SCNC: 141 MMOL/L (ref 136–145)
SODIUM SERPL-SCNC: 142 MMOL/L (ref 136–145)
SODIUM SERPL-SCNC: 142 MMOL/L (ref 136–145)
SODIUM SERPL-SCNC: 143 MMOL/L (ref 136–145)
SODIUM SERPL-SCNC: 143 MMOL/L (ref 136–145)
STRESS TARGET HR: 146 BPM
TOTAL RATE: 0 BREATHS/MINUTE
TRIGL SERPL-MCNC: 39 MG/DL (ref 0–150)
TROPONIN T SERPL-MCNC: 0.03 NG/ML (ref 0–0.03)
TROPONIN T SERPL-MCNC: 0.1 NG/ML (ref 0–0.03)
TROPONIN T SERPL-MCNC: 0.13 NG/ML (ref 0–0.03)
TROPONIN T SERPL-MCNC: 0.14 NG/ML (ref 0–0.03)
TROPONIN T SERPL-MCNC: 0.15 NG/ML (ref 0–0.03)
TSH SERPL DL<=0.05 MIU/L-ACNC: 3.38 UIU/ML (ref 0.27–4.2)
UFH PPP CHRO-ACNC: 0.15 IU/ML (ref 0.3–0.7)
UFH PPP CHRO-ACNC: 0.27 IU/ML (ref 0.3–0.7)
UFH PPP CHRO-ACNC: 0.32 IU/ML (ref 0.3–0.7)
UFH PPP CHRO-ACNC: 0.33 IU/ML (ref 0.3–0.7)
UFH PPP CHRO-ACNC: 0.34 IU/ML (ref 0.3–0.7)
UFH PPP CHRO-ACNC: 0.35 IU/ML (ref 0.3–0.7)
UFH PPP CHRO-ACNC: 0.38 IU/ML (ref 0.3–0.7)
UFH PPP CHRO-ACNC: 0.38 IU/ML (ref 0.3–0.7)
UFH PPP CHRO-ACNC: 0.44 IU/ML (ref 0.3–0.7)
UFH PPP CHRO-ACNC: 0.84 IU/ML (ref 0.3–0.7)
UFH PPP CHRO-ACNC: >1.1 IU/ML (ref 0.3–0.7)
VANCOMYCIN TROUGH SERPL-MCNC: 11.4 MCG/ML (ref 5–20)
VANCOMYCIN TROUGH SERPL-MCNC: 25.9 MCG/ML (ref 5–20)
VENTILATOR MODE: ABNORMAL
VLDLC SERPL-MCNC: 11 MG/DL (ref 5–40)
WBC # BLD AUTO: 2.85 10*3/MM3 (ref 3.4–10.8)
WBC # BLD AUTO: 3.32 10*3/MM3 (ref 3.4–10.8)
WBC # BLD AUTO: 3.71 10*3/MM3 (ref 3.4–10.8)
WBC # BLD AUTO: 5.31 10*3/MM3 (ref 3.4–10.8)
WBC # BLD AUTO: 5.34 10*3/MM3 (ref 3.4–10.8)
WBC NRBC COR # BLD: 12.5 10*3/MM3 (ref 3.4–10.8)
WBC NRBC COR # BLD: 2.15 10*3/MM3 (ref 3.4–10.8)
WBC NRBC COR # BLD: 2.29 10*3/MM3 (ref 3.4–10.8)
WBC NRBC COR # BLD: 2.3 10*3/MM3 (ref 3.4–10.8)
WBC NRBC COR # BLD: 2.57 10*3/MM3 (ref 3.4–10.8)
WBC NRBC COR # BLD: 2.59 10*3/MM3 (ref 3.4–10.8)
WBC NRBC COR # BLD: 2.75 10*3/MM3 (ref 3.4–10.8)
WBC NRBC COR # BLD: 2.9 10*3/MM3 (ref 3.4–10.8)
WBC NRBC COR # BLD: 2.93 10*3/MM3 (ref 3.4–10.8)
WBC NRBC COR # BLD: 3.09 10*3/MM3 (ref 3.4–10.8)
WBC NRBC COR # BLD: 3.26 10*3/MM3 (ref 3.4–10.8)
WBC NRBC COR # BLD: 3.3 10*3/MM3 (ref 3.4–10.8)
WBC NRBC COR # BLD: 3.52 10*3/MM3 (ref 3.4–10.8)
WBC NRBC COR # BLD: 3.78 10*3/MM3 (ref 3.4–10.8)
WBC NRBC COR # BLD: 3.81 10*3/MM3 (ref 3.4–10.8)
WBC NRBC COR # BLD: 3.88 10*3/MM3 (ref 3.4–10.8)
WBC NRBC COR # BLD: 4.21 10*3/MM3 (ref 3.4–10.8)
WBC NRBC COR # BLD: 4.43 10*3/MM3 (ref 3.4–10.8)
WBC NRBC COR # BLD: 4.45 10*3/MM3 (ref 3.4–10.8)
WBC NRBC COR # BLD: 4.57 10*3/MM3 (ref 3.4–10.8)
WHOLE BLOOD HOLD SPECIMEN: NORMAL
WHOLE BLOOD HOLD SPECIMEN: NORMAL

## 2021-01-01 PROCEDURE — 80069 RENAL FUNCTION PANEL: CPT | Performed by: INTERNAL MEDICINE

## 2021-01-01 PROCEDURE — 99231 SBSQ HOSP IP/OBS SF/LOW 25: CPT | Performed by: INTERNAL MEDICINE

## 2021-01-01 PROCEDURE — 25010000002 FENTANYL 10 MCG/1 ML NS: Performed by: INTERNAL MEDICINE

## 2021-01-01 PROCEDURE — 85025 COMPLETE CBC W/AUTO DIFF WBC: CPT | Performed by: EMERGENCY MEDICINE

## 2021-01-01 PROCEDURE — 99291 CRITICAL CARE FIRST HOUR: CPT | Performed by: INTERNAL MEDICINE

## 2021-01-01 PROCEDURE — 83735 ASSAY OF MAGNESIUM: CPT | Performed by: INTERNAL MEDICINE

## 2021-01-01 PROCEDURE — 85520 HEPARIN ASSAY: CPT

## 2021-01-01 PROCEDURE — 94003 VENT MGMT INPAT SUBQ DAY: CPT

## 2021-01-01 PROCEDURE — 99232 SBSQ HOSP IP/OBS MODERATE 35: CPT | Performed by: INTERNAL MEDICINE

## 2021-01-01 PROCEDURE — 82330 ASSAY OF CALCIUM: CPT | Performed by: INTERNAL MEDICINE

## 2021-01-01 PROCEDURE — 25010000002 PHENYLEPHRINE 10 MG/ML SOLUTION 5 ML VIAL: Performed by: INTERNAL MEDICINE

## 2021-01-01 PROCEDURE — 25010000002 HEPARIN (PORCINE) 25000-0.45 UT/250ML-% SOLUTION

## 2021-01-01 PROCEDURE — 85027 COMPLETE CBC AUTOMATED: CPT | Performed by: INTERNAL MEDICINE

## 2021-01-01 PROCEDURE — 25010000002 ALBUMIN HUMAN 25% PER 50 ML: Performed by: NURSE PRACTITIONER

## 2021-01-01 PROCEDURE — 85025 COMPLETE CBC W/AUTO DIFF WBC: CPT | Performed by: INTERNAL MEDICINE

## 2021-01-01 PROCEDURE — 36600 WITHDRAWAL OF ARTERIAL BLOOD: CPT

## 2021-01-01 PROCEDURE — 94799 UNLISTED PULMONARY SVC/PX: CPT

## 2021-01-01 PROCEDURE — 5A1D70Z PERFORMANCE OF URINARY FILTRATION, INTERMITTENT, LESS THAN 6 HOURS PER DAY: ICD-10-PCS | Performed by: INTERNAL MEDICINE

## 2021-01-01 PROCEDURE — 80053 COMPREHEN METABOLIC PANEL: CPT | Performed by: INTERNAL MEDICINE

## 2021-01-01 PROCEDURE — 83690 ASSAY OF LIPASE: CPT | Performed by: EMERGENCY MEDICINE

## 2021-01-01 PROCEDURE — 02HV33Z INSERTION OF INFUSION DEVICE INTO SUPERIOR VENA CAVA, PERCUTANEOUS APPROACH: ICD-10-PCS | Performed by: RADIOLOGY

## 2021-01-01 PROCEDURE — 84484 ASSAY OF TROPONIN QUANT: CPT | Performed by: EMERGENCY MEDICINE

## 2021-01-01 PROCEDURE — 25010000002 VANCOMYCIN PER 500 MG

## 2021-01-01 PROCEDURE — 25010000002 DOPAMINE PER 40 MG: Performed by: INTERNAL MEDICINE

## 2021-01-01 PROCEDURE — 82805 BLOOD GASES W/O2 SATURATION: CPT

## 2021-01-01 PROCEDURE — 31500 INSERT EMERGENCY AIRWAY: CPT | Performed by: NURSE PRACTITIONER

## 2021-01-01 PROCEDURE — 25010000002 DOPAMINE PER 40 MG: Performed by: NURSE PRACTITIONER

## 2021-01-01 PROCEDURE — G0480 DRUG TEST DEF 1-7 CLASSES: HCPCS | Performed by: NURSE PRACTITIONER

## 2021-01-01 PROCEDURE — 99232 SBSQ HOSP IP/OBS MODERATE 35: CPT | Performed by: PHYSICIAN ASSISTANT

## 2021-01-01 PROCEDURE — 99233 SBSQ HOSP IP/OBS HIGH 50: CPT | Performed by: INTERNAL MEDICINE

## 2021-01-01 PROCEDURE — 25010000002 PROPOFOL 10 MG/ML EMULSION: Performed by: NURSE PRACTITIONER

## 2021-01-01 PROCEDURE — 25010000002 PROPOFOL 10 MG/ML EMULSION: Performed by: INTERNAL MEDICINE

## 2021-01-01 PROCEDURE — 25010000002 PIPERACILLIN SOD-TAZOBACTAM PER 1 G: Performed by: INTERNAL MEDICINE

## 2021-01-01 PROCEDURE — 99285 EMERGENCY DEPT VISIT HI MDM: CPT

## 2021-01-01 PROCEDURE — C1769 GUIDE WIRE: HCPCS

## 2021-01-01 PROCEDURE — 25010000002 CEFTRIAXONE PER 250 MG: Performed by: INTERNAL MEDICINE

## 2021-01-01 PROCEDURE — 84100 ASSAY OF PHOSPHORUS: CPT | Performed by: INTERNAL MEDICINE

## 2021-01-01 PROCEDURE — 99221 1ST HOSP IP/OBS SF/LOW 40: CPT | Performed by: INTERNAL MEDICINE

## 2021-01-01 PROCEDURE — 85610 PROTHROMBIN TIME: CPT | Performed by: NURSE PRACTITIONER

## 2021-01-01 PROCEDURE — 36556 INSERT NON-TUNNEL CV CATH: CPT | Performed by: NURSE PRACTITIONER

## 2021-01-01 PROCEDURE — 25010000002 MIDAZOLAM PER 1 MG: Performed by: NURSE PRACTITIONER

## 2021-01-01 PROCEDURE — G0432 EIA HIV-1/HIV-2 SCREEN: HCPCS | Performed by: INTERNAL MEDICINE

## 2021-01-01 PROCEDURE — 71045 X-RAY EXAM CHEST 1 VIEW: CPT

## 2021-01-01 PROCEDURE — 0BH17EZ INSERTION OF ENDOTRACHEAL AIRWAY INTO TRACHEA, VIA NATURAL OR ARTIFICIAL OPENING: ICD-10-PCS | Performed by: INTERNAL MEDICINE

## 2021-01-01 PROCEDURE — 85730 THROMBOPLASTIN TIME PARTIAL: CPT | Performed by: INTERNAL MEDICINE

## 2021-01-01 PROCEDURE — 87636 SARSCOV2 & INF A&B AMP PRB: CPT | Performed by: EMERGENCY MEDICINE

## 2021-01-01 PROCEDURE — 93306 TTE W/DOPPLER COMPLETE: CPT

## 2021-01-01 PROCEDURE — 0 IOPAMIDOL PER 1 ML: Performed by: INTERNAL MEDICINE

## 2021-01-01 PROCEDURE — 25010000002 ALBUMIN HUMAN 25% PER 50 ML

## 2021-01-01 PROCEDURE — P9047 ALBUMIN (HUMAN), 25%, 50ML: HCPCS | Performed by: INTERNAL MEDICINE

## 2021-01-01 PROCEDURE — 94640 AIRWAY INHALATION TREATMENT: CPT

## 2021-01-01 PROCEDURE — 80076 HEPATIC FUNCTION PANEL: CPT | Performed by: INTERNAL MEDICINE

## 2021-01-01 PROCEDURE — 80307 DRUG TEST PRSMV CHEM ANLYZR: CPT | Performed by: NURSE PRACTITIONER

## 2021-01-01 PROCEDURE — 25010000002 PIPERACILLIN SOD-TAZOBACTAM PER 1 G

## 2021-01-01 PROCEDURE — 97530 THERAPEUTIC ACTIVITIES: CPT

## 2021-01-01 PROCEDURE — 80074 ACUTE HEPATITIS PANEL: CPT | Performed by: INTERNAL MEDICINE

## 2021-01-01 PROCEDURE — 84484 ASSAY OF TROPONIN QUANT: CPT | Performed by: INTERNAL MEDICINE

## 2021-01-01 PROCEDURE — 25010000002 AZITHROMYCIN PER 500 MG: Performed by: INTERNAL MEDICINE

## 2021-01-01 PROCEDURE — 83880 ASSAY OF NATRIURETIC PEPTIDE: CPT | Performed by: EMERGENCY MEDICINE

## 2021-01-01 PROCEDURE — 82375 ASSAY CARBOXYHB QUANT: CPT

## 2021-01-01 PROCEDURE — 80048 BASIC METABOLIC PNL TOTAL CA: CPT | Performed by: INTERNAL MEDICINE

## 2021-01-01 PROCEDURE — 74018 RADEX ABDOMEN 1 VIEW: CPT

## 2021-01-01 PROCEDURE — 85610 PROTHROMBIN TIME: CPT | Performed by: INTERNAL MEDICINE

## 2021-01-01 PROCEDURE — 83050 HGB METHEMOGLOBIN QUAN: CPT

## 2021-01-01 PROCEDURE — 25010000002 ALTEPLASE 2 MG RECONSTITUTED SOLUTION 1 EACH VIAL: Performed by: NURSE PRACTITIONER

## 2021-01-01 PROCEDURE — 97166 OT EVAL MOD COMPLEX 45 MIN: CPT

## 2021-01-01 PROCEDURE — 82962 GLUCOSE BLOOD TEST: CPT

## 2021-01-01 PROCEDURE — 82533 TOTAL CORTISOL: CPT | Performed by: INTERNAL MEDICINE

## 2021-01-01 PROCEDURE — 63710000001 INSULIN REGULAR HUMAN PER 5 UNITS: Performed by: EMERGENCY MEDICINE

## 2021-01-01 PROCEDURE — 86140 C-REACTIVE PROTEIN: CPT | Performed by: INTERNAL MEDICINE

## 2021-01-01 PROCEDURE — 25010000002 MIDAZOLAM PER 1 MG

## 2021-01-01 PROCEDURE — 99222 1ST HOSP IP/OBS MODERATE 55: CPT | Performed by: INTERNAL MEDICINE

## 2021-01-01 PROCEDURE — 25010000002 ALBUMIN HUMAN 25% PER 50 ML: Performed by: INTERNAL MEDICINE

## 2021-01-01 PROCEDURE — 80053 COMPREHEN METABOLIC PANEL: CPT | Performed by: EMERGENCY MEDICINE

## 2021-01-01 PROCEDURE — 99239 HOSP IP/OBS DSCHRG MGMT >30: CPT | Performed by: NURSE PRACTITIONER

## 2021-01-01 PROCEDURE — 25010000002 VANCOMYCIN PER 500 MG: Performed by: INTERNAL MEDICINE

## 2021-01-01 PROCEDURE — 85610 PROTHROMBIN TIME: CPT | Performed by: EMERGENCY MEDICINE

## 2021-01-01 PROCEDURE — 83605 ASSAY OF LACTIC ACID: CPT | Performed by: EMERGENCY MEDICINE

## 2021-01-01 PROCEDURE — 25010000002 HALOPERIDOL LACTATE PER 5 MG: Performed by: NURSE PRACTITIONER

## 2021-01-01 PROCEDURE — 06HY33Z INSERTION OF INFUSION DEVICE INTO LOWER VEIN, PERCUTANEOUS APPROACH: ICD-10-PCS | Performed by: INTERNAL MEDICINE

## 2021-01-01 PROCEDURE — 84145 PROCALCITONIN (PCT): CPT | Performed by: INTERNAL MEDICINE

## 2021-01-01 PROCEDURE — 25010000002 HEPARIN (PORCINE) PER 1000 UNITS: Performed by: INTERNAL MEDICINE

## 2021-01-01 PROCEDURE — 85730 THROMBOPLASTIN TIME PARTIAL: CPT

## 2021-01-01 PROCEDURE — 36620 INSERTION CATHETER ARTERY: CPT | Performed by: NURSE PRACTITIONER

## 2021-01-01 PROCEDURE — 25010000002 DOPAMINE PER 40 MG: Performed by: FAMILY MEDICINE

## 2021-01-01 PROCEDURE — 85520 HEPARIN ASSAY: CPT | Performed by: INTERNAL MEDICINE

## 2021-01-01 PROCEDURE — 93306 TTE W/DOPPLER COMPLETE: CPT | Performed by: INTERNAL MEDICINE

## 2021-01-01 PROCEDURE — 83735 ASSAY OF MAGNESIUM: CPT | Performed by: EMERGENCY MEDICINE

## 2021-01-01 PROCEDURE — 84443 ASSAY THYROID STIM HORMONE: CPT | Performed by: INTERNAL MEDICINE

## 2021-01-01 PROCEDURE — 99284 EMERGENCY DEPT VISIT MOD MDM: CPT

## 2021-01-01 PROCEDURE — 85027 COMPLETE CBC AUTOMATED: CPT | Performed by: NURSE PRACTITIONER

## 2021-01-01 PROCEDURE — 5A1D90Z PERFORMANCE OF URINARY FILTRATION, CONTINUOUS, GREATER THAN 18 HOURS PER DAY: ICD-10-PCS | Performed by: INTERNAL MEDICINE

## 2021-01-01 PROCEDURE — 84145 PROCALCITONIN (PCT): CPT | Performed by: EMERGENCY MEDICINE

## 2021-01-01 PROCEDURE — 25010000002 HYDROCORTISONE SODIUM SUCCINATE 100 MG RECONSTITUTED SOLUTION: Performed by: INTERNAL MEDICINE

## 2021-01-01 PROCEDURE — 80202 ASSAY OF VANCOMYCIN: CPT

## 2021-01-01 PROCEDURE — 36581 REPLACE TUNNELED CV CATH: CPT | Performed by: INTERNAL MEDICINE

## 2021-01-01 PROCEDURE — P9047 ALBUMIN (HUMAN), 25%, 50ML: HCPCS | Performed by: NURSE PRACTITIONER

## 2021-01-01 PROCEDURE — 87040 BLOOD CULTURE FOR BACTERIA: CPT | Performed by: EMERGENCY MEDICINE

## 2021-01-01 PROCEDURE — 93005 ELECTROCARDIOGRAM TRACING: CPT | Performed by: EMERGENCY MEDICINE

## 2021-01-01 PROCEDURE — 97162 PT EVAL MOD COMPLEX 30 MIN: CPT

## 2021-01-01 PROCEDURE — 25010000002 FENTANYL 10 MCG/1 ML NS: Performed by: NURSE PRACTITIONER

## 2021-01-01 PROCEDURE — 25010000002 ONDANSETRON PER 1 MG: Performed by: INTERNAL MEDICINE

## 2021-01-01 PROCEDURE — 80061 LIPID PANEL: CPT | Performed by: INTERNAL MEDICINE

## 2021-01-01 PROCEDURE — 36556 INSERT NON-TUNNEL CV CATH: CPT | Performed by: INTERNAL MEDICINE

## 2021-01-01 PROCEDURE — C1750 CATH, HEMODIALYSIS,LONG-TERM: HCPCS

## 2021-01-01 PROCEDURE — 25010000002 FENTANYL CITRATE (PF) 2500 MCG/50ML SOLUTION: Performed by: INTERNAL MEDICINE

## 2021-01-01 PROCEDURE — 0JH63XZ INSERTION OF TUNNELED VASCULAR ACCESS DEVICE INTO CHEST SUBCUTANEOUS TISSUE AND FASCIA, PERCUTANEOUS APPROACH: ICD-10-PCS | Performed by: RADIOLOGY

## 2021-01-01 PROCEDURE — 5A1955Z RESPIRATORY VENTILATION, GREATER THAN 96 CONSECUTIVE HOURS: ICD-10-PCS | Performed by: INTERNAL MEDICINE

## 2021-01-01 PROCEDURE — 25010000002 HEPARIN (PORCINE) PER 1000 UNITS

## 2021-01-01 PROCEDURE — 36415 COLL VENOUS BLD VENIPUNCTURE: CPT

## 2021-01-01 PROCEDURE — 94761 N-INVAS EAR/PLS OXIMETRY MLT: CPT

## 2021-01-01 PROCEDURE — 25010000002 VANCOMYCIN 10 G RECONSTITUTED SOLUTION

## 2021-01-01 PROCEDURE — 25010000002 HEPARIN (PORCINE) 25000-0.45 UT/250ML-% SOLUTION: Performed by: INTERNAL MEDICINE

## 2021-01-01 PROCEDURE — P9047 ALBUMIN (HUMAN), 25%, 50ML: HCPCS

## 2021-01-01 PROCEDURE — 77001 FLUOROGUIDE FOR VEIN DEVICE: CPT

## 2021-01-01 PROCEDURE — 94002 VENT MGMT INPAT INIT DAY: CPT

## 2021-01-01 PROCEDURE — 71275 CT ANGIOGRAPHY CHEST: CPT

## 2021-01-01 PROCEDURE — 25010000002 ALTEPLASE 2 MG RECONSTITUTED SOLUTION: Performed by: INTERNAL MEDICINE

## 2021-01-01 PROCEDURE — 25010000002 PROPOFOL 10 MG/ML EMULSION

## 2021-01-01 PROCEDURE — 76937 US GUIDE VASCULAR ACCESS: CPT | Performed by: INTERNAL MEDICINE

## 2021-01-01 PROCEDURE — 99232 SBSQ HOSP IP/OBS MODERATE 35: CPT | Performed by: NURSE PRACTITIONER

## 2021-01-01 PROCEDURE — B518ZZA FLUOROSCOPY OF SUPERIOR VENA CAVA, GUIDANCE: ICD-10-PCS | Performed by: RADIOLOGY

## 2021-01-01 PROCEDURE — 84484 ASSAY OF TROPONIN QUANT: CPT | Performed by: NURSE PRACTITIONER

## 2021-01-01 RX ORDER — SODIUM CHLORIDE 0.9 % (FLUSH) 0.9 %
10 SYRINGE (ML) INJECTION AS NEEDED
Status: DISCONTINUED | OUTPATIENT
Start: 2021-01-01 | End: 2021-01-01

## 2021-01-01 RX ORDER — SODIUM CHLORIDE 0.9 % (FLUSH) 0.9 %
10 SYRINGE (ML) INJECTION EVERY 12 HOURS SCHEDULED
Status: DISCONTINUED | OUTPATIENT
Start: 2021-01-01 | End: 2021-01-01 | Stop reason: HOSPADM

## 2021-01-01 RX ORDER — HEPARIN SODIUM 1000 [USP'U]/ML
1600 INJECTION, SOLUTION INTRAVENOUS; SUBCUTANEOUS AS NEEDED
Status: DISCONTINUED | OUTPATIENT
Start: 2021-01-01 | End: 2021-01-01 | Stop reason: HOSPADM

## 2021-01-01 RX ORDER — ONDANSETRON 2 MG/ML
4 INJECTION INTRAMUSCULAR; INTRAVENOUS EVERY 6 HOURS PRN
Status: DISCONTINUED | OUTPATIENT
Start: 2021-01-01 | End: 2021-01-01 | Stop reason: HOSPADM

## 2021-01-01 RX ORDER — HEPARIN SODIUM 10000 [USP'U]/100ML
18 INJECTION, SOLUTION INTRAVENOUS
Status: DISCONTINUED | OUTPATIENT
Start: 2021-01-01 | End: 2021-01-01

## 2021-01-01 RX ORDER — FAMOTIDINE 20 MG/1
20 TABLET, FILM COATED ORAL DAILY
Status: DISCONTINUED | OUTPATIENT
Start: 2021-01-01 | End: 2021-01-01

## 2021-01-01 RX ORDER — SEVELAMER HYDROCHLORIDE 800 MG/1
TABLET, FILM COATED ORAL
COMMUNITY

## 2021-01-01 RX ORDER — ALBUMIN (HUMAN) 12.5 G/50ML
25 SOLUTION INTRAVENOUS ONCE
Status: COMPLETED | OUTPATIENT
Start: 2021-01-01 | End: 2021-01-01

## 2021-01-01 RX ORDER — ANTICOAGULANT CITRATE DEXTROSE SOLUTION FORMULA A 12.25; 11; 3.65 G/500ML; G/500ML; G/500ML
0-2 SOLUTION INTRAVENOUS AS NEEDED
Status: DISCONTINUED | OUTPATIENT
Start: 2021-01-01 | End: 2021-01-01 | Stop reason: HOSPADM

## 2021-01-01 RX ORDER — KETAMINE HCL IN NACL, ISO-OSM 100MG/10ML
30 SYRINGE (ML) INJECTION ONCE
Status: COMPLETED | OUTPATIENT
Start: 2021-01-01 | End: 2021-01-01

## 2021-01-01 RX ORDER — CALCIUM CARBONATE 750 MG/1
750 TABLET, CHEWABLE ORAL AS NEEDED
Status: ON HOLD | COMMUNITY
End: 2021-01-01

## 2021-01-01 RX ORDER — CHLORHEXIDINE GLUCONATE 0.12 MG/ML
15 RINSE ORAL EVERY 12 HOURS SCHEDULED
Status: DISCONTINUED | OUTPATIENT
Start: 2021-01-01 | End: 2021-01-01 | Stop reason: HOSPADM

## 2021-01-01 RX ORDER — DEXTROSE AND SODIUM CHLORIDE 5; .9 G/100ML; G/100ML
100 INJECTION, SOLUTION INTRAVENOUS CONTINUOUS
Status: DISCONTINUED | OUTPATIENT
Start: 2021-01-01 | End: 2021-01-01 | Stop reason: HOSPADM

## 2021-01-01 RX ORDER — MIDODRINE HYDROCHLORIDE 5 MG/1
5 TABLET ORAL
Status: DISCONTINUED | OUTPATIENT
Start: 2021-01-01 | End: 2021-01-01

## 2021-01-01 RX ORDER — MIDAZOLAM IN NACL,ISO-OSMOT/PF 50 MG/50ML
1-10 INFUSION BOTTLE (ML) INTRAVENOUS
Status: DISCONTINUED | OUTPATIENT
Start: 2021-01-01 | End: 2021-01-01

## 2021-01-01 RX ORDER — MIDAZOLAM HYDROCHLORIDE 1 MG/ML
2 INJECTION INTRAMUSCULAR; INTRAVENOUS ONCE
Status: COMPLETED | OUTPATIENT
Start: 2021-01-01 | End: 2021-01-01

## 2021-01-01 RX ORDER — ALBUMIN (HUMAN) 12.5 G/50ML
12.5 SOLUTION INTRAVENOUS AS NEEDED
Status: DISCONTINUED | OUTPATIENT
Start: 2021-01-01 | End: 2021-01-01 | Stop reason: HOSPADM

## 2021-01-01 RX ORDER — SODIUM CHLORIDE 0.9 % (FLUSH) 0.9 %
20 SYRINGE (ML) INJECTION AS NEEDED
Status: DISCONTINUED | OUTPATIENT
Start: 2021-01-01 | End: 2021-01-01 | Stop reason: HOSPADM

## 2021-01-01 RX ORDER — CALCIUM CHLORIDE, MAGNESIUM CHLORIDE, SODIUM CHLORIDE, SODIUM BICARBONATE, POTASSIUM CHLORIDE AND SODIUM PHOSPHATE DIBASIC DIHYDRATE 3.68; 3.05; 6.34; 3.09; .314; .187 G/L; G/L; G/L; G/L; G/L; G/L
1000 INJECTION INTRAVENOUS CONTINUOUS
Status: DISCONTINUED | OUTPATIENT
Start: 2021-01-01 | End: 2021-01-01

## 2021-01-01 RX ORDER — OXYMETAZOLINE HYDROCHLORIDE 0.05 G/100ML
2 SPRAY NASAL 2 TIMES DAILY
Status: COMPLETED | OUTPATIENT
Start: 2021-01-01 | End: 2021-01-01

## 2021-01-01 RX ORDER — NOREPINEPHRINE BIT/0.9 % NACL 16MG/250ML
.02-.5 INFUSION BOTTLE (ML) INTRAVENOUS
Status: ACTIVE | OUTPATIENT
Start: 2021-01-01 | End: 2021-01-01

## 2021-01-01 RX ORDER — ALBUTEROL SULFATE 2.5 MG/3ML
2.5 SOLUTION RESPIRATORY (INHALATION) EVERY 6 HOURS PRN
Status: DISCONTINUED | OUTPATIENT
Start: 2021-01-01 | End: 2021-01-01 | Stop reason: HOSPADM

## 2021-01-01 RX ORDER — ASPIRIN 81 MG/1
324 TABLET, CHEWABLE ORAL ONCE
Status: COMPLETED | OUTPATIENT
Start: 2021-01-01 | End: 2021-01-01

## 2021-01-01 RX ORDER — ALBUMIN (HUMAN) 12.5 G/50ML
SOLUTION INTRAVENOUS
Status: COMPLETED
Start: 2021-01-01 | End: 2021-01-01

## 2021-01-01 RX ORDER — NICOTINE POLACRILEX 4 MG
15 LOZENGE BUCCAL
Status: DISCONTINUED | OUTPATIENT
Start: 2021-01-01 | End: 2021-01-01

## 2021-01-01 RX ORDER — SODIUM CHLORIDE 0.9 % (FLUSH) 0.9 %
10 SYRINGE (ML) INJECTION EVERY 12 HOURS SCHEDULED
Status: DISCONTINUED | OUTPATIENT
Start: 2021-01-01 | End: 2021-01-01

## 2021-01-01 RX ORDER — SODIUM CHLORIDE 0.9 % (FLUSH) 0.9 %
10 SYRINGE (ML) INJECTION AS NEEDED
Status: DISCONTINUED | OUTPATIENT
Start: 2021-01-01 | End: 2021-01-01 | Stop reason: HOSPADM

## 2021-01-01 RX ORDER — MIDODRINE HYDROCHLORIDE 10 MG/1
10 TABLET ORAL ONCE
Status: COMPLETED | OUTPATIENT
Start: 2021-01-01 | End: 2021-01-01

## 2021-01-01 RX ORDER — NOREPINEPHRINE BIT/0.9 % NACL 8 MG/250ML
INFUSION BOTTLE (ML) INTRAVENOUS
Status: COMPLETED
Start: 2021-01-01 | End: 2021-01-01

## 2021-01-01 RX ORDER — MIDODRINE HYDROCHLORIDE 10 MG/1
10 TABLET ORAL
Status: DISCONTINUED | OUTPATIENT
Start: 2021-01-01 | End: 2021-01-01

## 2021-01-01 RX ORDER — ALBUTEROL SULFATE 90 UG/1
2 AEROSOL, METERED RESPIRATORY (INHALATION) EVERY 4 HOURS PRN
COMMUNITY

## 2021-01-01 RX ORDER — PSEUDOEPHEDRINE HCL 30 MG
60 TABLET ORAL EVERY 12 HOURS SCHEDULED
Status: DISCONTINUED | OUTPATIENT
Start: 2021-01-01 | End: 2021-01-01 | Stop reason: HOSPADM

## 2021-01-01 RX ORDER — ALBUMIN (HUMAN) 12.5 G/50ML
SOLUTION INTRAVENOUS
Status: DISCONTINUED
Start: 2021-01-01 | End: 2021-01-01 | Stop reason: HOSPADM

## 2021-01-01 RX ORDER — ALBUMIN (HUMAN) 12.5 G/50ML
12.5 SOLUTION INTRAVENOUS 2 TIMES DAILY
Status: COMPLETED | OUTPATIENT
Start: 2021-01-01 | End: 2021-01-01

## 2021-01-01 RX ORDER — ASPIRIN 81 MG/1
81 TABLET, CHEWABLE ORAL DAILY
Qty: 30 TABLET | Refills: 0 | Status: SHIPPED | OUTPATIENT
Start: 2021-01-01

## 2021-01-01 RX ORDER — DEXTROSE MONOHYDRATE 25 G/50ML
50 INJECTION, SOLUTION INTRAVENOUS ONCE
Status: COMPLETED | OUTPATIENT
Start: 2021-01-01 | End: 2021-01-01

## 2021-01-01 RX ORDER — ASPIRIN 81 MG/1
81 TABLET, CHEWABLE ORAL DAILY
Status: DISCONTINUED | OUTPATIENT
Start: 2021-01-01 | End: 2021-01-01 | Stop reason: HOSPADM

## 2021-01-01 RX ORDER — FAMOTIDINE 20 MG/1
20 TABLET, FILM COATED ORAL 2 TIMES DAILY PRN
COMMUNITY

## 2021-01-01 RX ORDER — FAMOTIDINE 20 MG/1
20 TABLET, FILM COATED ORAL DAILY
Status: DISCONTINUED | OUTPATIENT
Start: 2021-01-01 | End: 2021-01-01 | Stop reason: HOSPADM

## 2021-01-01 RX ORDER — NOREPINEPHRINE BIT/0.9 % NACL 16MG/250ML
.02-.3 INFUSION BOTTLE (ML) INTRAVENOUS
Status: DISCONTINUED | OUTPATIENT
Start: 2021-01-01 | End: 2021-01-01

## 2021-01-01 RX ORDER — MIDAZOLAM HYDROCHLORIDE 1 MG/ML
INJECTION INTRAMUSCULAR; INTRAVENOUS
Status: COMPLETED
Start: 2021-01-01 | End: 2021-01-01

## 2021-01-01 RX ORDER — ANTICOAGULANT CITRATE DEXTROSE SOLUTION FORMULA A 12.25; 11; 3.65 G/500ML; G/500ML; G/500ML
0-2 SOLUTION INTRAVENOUS AS NEEDED
Status: DISCONTINUED | OUTPATIENT
Start: 2021-01-01 | End: 2021-01-01

## 2021-01-01 RX ORDER — PYRIDOSTIGMINE BROMIDE 180 MG/1
180 TABLET, EXTENDED RELEASE ORAL DAILY
Status: DISCONTINUED | OUTPATIENT
Start: 2021-01-01 | End: 2021-01-01

## 2021-01-01 RX ORDER — ATORVASTATIN CALCIUM 40 MG/1
80 TABLET, FILM COATED ORAL NIGHTLY
Status: DISCONTINUED | OUTPATIENT
Start: 2021-01-01 | End: 2021-01-01 | Stop reason: HOSPADM

## 2021-01-01 RX ORDER — MIDODRINE HYDROCHLORIDE 5 MG/1
15 TABLET ORAL
Qty: 270 TABLET | Refills: 0 | Status: SHIPPED | OUTPATIENT
Start: 2021-01-01

## 2021-01-01 RX ORDER — NOREPINEPHRINE BIT/0.9 % NACL 8 MG/250ML
.02-.3 INFUSION BOTTLE (ML) INTRAVENOUS
Status: DISPENSED | OUTPATIENT
Start: 2021-01-01 | End: 2021-01-01

## 2021-01-01 RX ORDER — MAGNESIUM SULFATE HEPTAHYDRATE 40 MG/ML
2 INJECTION, SOLUTION INTRAVENOUS AS NEEDED
Status: ACTIVE | OUTPATIENT
Start: 2021-01-01 | End: 2021-01-01

## 2021-01-01 RX ORDER — HYDROCODONE BITARTRATE AND ACETAMINOPHEN 5; 325 MG/1; MG/1
1 TABLET ORAL EVERY 6 HOURS PRN
Status: DISCONTINUED | OUTPATIENT
Start: 2021-01-01 | End: 2021-01-01

## 2021-01-01 RX ORDER — HEPARIN SODIUM 10000 [USP'U]/100ML
12 INJECTION, SOLUTION INTRAVENOUS
Status: DISCONTINUED | OUTPATIENT
Start: 2021-01-01 | End: 2021-01-01

## 2021-01-01 RX ORDER — VANCOMYCIN HYDROCHLORIDE 1 G/200ML
1000 INJECTION, SOLUTION INTRAVENOUS EVERY 24 HOURS
Status: COMPLETED | OUTPATIENT
Start: 2021-01-01 | End: 2021-01-01

## 2021-01-01 RX ORDER — ALBUMIN (HUMAN) 12.5 G/50ML
12.5 SOLUTION INTRAVENOUS 3 TIMES DAILY
Status: COMPLETED | OUTPATIENT
Start: 2021-01-01 | End: 2021-01-01

## 2021-01-01 RX ORDER — ASPIRIN 81 MG/1
81 TABLET, CHEWABLE ORAL DAILY
Status: DISCONTINUED | OUTPATIENT
Start: 2021-01-01 | End: 2021-01-01

## 2021-01-01 RX ORDER — PANTOPRAZOLE SODIUM 40 MG/1
40 TABLET, DELAYED RELEASE ORAL
Status: DISCONTINUED | OUTPATIENT
Start: 2021-01-01 | End: 2021-01-01 | Stop reason: HOSPADM

## 2021-01-01 RX ORDER — SEVELAMER CARBONATE 800 MG/1
800 TABLET, FILM COATED ORAL
Start: 2021-01-01

## 2021-01-01 RX ORDER — ALBUTEROL SULFATE 2.5 MG/3ML
2.5 SOLUTION RESPIRATORY (INHALATION)
Status: DISCONTINUED | OUTPATIENT
Start: 2021-01-01 | End: 2021-01-01

## 2021-01-01 RX ORDER — ATORVASTATIN CALCIUM 80 MG/1
80 TABLET, FILM COATED ORAL NIGHTLY
Qty: 30 TABLET | Refills: 0 | Status: SHIPPED | OUTPATIENT
Start: 2021-01-01

## 2021-01-01 RX ORDER — HEPARIN SODIUM 1000 [USP'U]/ML
INJECTION, SOLUTION INTRAVENOUS; SUBCUTANEOUS
Status: COMPLETED
Start: 2021-01-01 | End: 2021-01-01

## 2021-01-01 RX ORDER — PSEUDOEPHEDRINE HCL 30 MG
60 TABLET ORAL EVERY 12 HOURS SCHEDULED
Qty: 120 TABLET | Refills: 0 | Status: SHIPPED | OUTPATIENT
Start: 2021-01-01

## 2021-01-01 RX ORDER — ALBUMIN (HUMAN) 12.5 G/50ML
12.5 SOLUTION INTRAVENOUS AS NEEDED
Status: ACTIVE | OUTPATIENT
Start: 2021-01-01 | End: 2021-01-01

## 2021-01-01 RX ORDER — ALBUTEROL SULFATE 2.5 MG/3ML
10 SOLUTION RESPIRATORY (INHALATION) ONCE
Status: DISCONTINUED | OUTPATIENT
Start: 2021-01-01 | End: 2021-01-01

## 2021-01-01 RX ORDER — HYDROCODONE BITARTRATE AND ACETAMINOPHEN 10; 325 MG/1; MG/1
1 TABLET ORAL 4 TIMES DAILY
COMMUNITY

## 2021-01-01 RX ORDER — FAMOTIDINE 20 MG/1
40 TABLET, FILM COATED ORAL ONCE
Status: DISCONTINUED | OUTPATIENT
Start: 2021-01-01 | End: 2021-01-01 | Stop reason: HOSPADM

## 2021-01-01 RX ORDER — SEVELAMER CARBONATE FOR ORAL SUSPENSION 800 MG/1
1600 POWDER, FOR SUSPENSION ORAL
Status: DISCONTINUED | OUTPATIENT
Start: 2021-01-01 | End: 2021-01-01

## 2021-01-01 RX ORDER — HYDROCODONE BITARTRATE AND ACETAMINOPHEN 5; 325 MG/1; MG/1
1 TABLET ORAL EVERY 4 HOURS PRN
Status: DISCONTINUED | OUTPATIENT
Start: 2021-01-01 | End: 2021-01-01 | Stop reason: HOSPADM

## 2021-01-01 RX ORDER — MAGNESIUM SULFATE HEPTAHYDRATE 40 MG/ML
2 INJECTION, SOLUTION INTRAVENOUS AS NEEDED
Status: DISCONTINUED | OUTPATIENT
Start: 2021-01-01 | End: 2021-01-01

## 2021-01-01 RX ORDER — DEXTROSE MONOHYDRATE 25 G/50ML
25 INJECTION, SOLUTION INTRAVENOUS
Status: DISCONTINUED | OUTPATIENT
Start: 2021-01-01 | End: 2021-01-01 | Stop reason: HOSPADM

## 2021-01-01 RX ORDER — POTASSIUM CHLORIDE 29.8 MG/ML
20 INJECTION INTRAVENOUS AS NEEDED
Status: DISCONTINUED | OUTPATIENT
Start: 2021-01-01 | End: 2021-01-01

## 2021-01-01 RX ORDER — CALCIUM CARBONATE 200(500)MG
1 TABLET,CHEWABLE ORAL 3 TIMES DAILY PRN
Status: DISCONTINUED | OUTPATIENT
Start: 2021-01-01 | End: 2021-01-01 | Stop reason: HOSPADM

## 2021-01-01 RX ORDER — LIDOCAINE HYDROCHLORIDE 10 MG/ML
INJECTION, SOLUTION EPIDURAL; INFILTRATION; INTRACAUDAL; PERINEURAL
Status: COMPLETED
Start: 2021-01-01 | End: 2021-01-01

## 2021-01-01 RX ORDER — RANITIDINE HCL 75 MG
20 TABLET ORAL AS NEEDED
Status: ON HOLD | COMMUNITY
End: 2021-01-01

## 2021-01-01 RX ORDER — ALBUMIN (HUMAN) 12.5 G/50ML
12.5 SOLUTION INTRAVENOUS ONCE
Status: COMPLETED | OUTPATIENT
Start: 2021-01-01 | End: 2021-01-01

## 2021-01-01 RX ORDER — ALBUMIN (HUMAN) 12.5 G/50ML
50 SOLUTION INTRAVENOUS ONCE
Status: COMPLETED | OUTPATIENT
Start: 2021-01-01 | End: 2021-01-01

## 2021-01-01 RX ORDER — PROPOFOL 10 MG/ML
VIAL (ML) INTRAVENOUS
Status: COMPLETED
Start: 2021-01-01 | End: 2021-01-01

## 2021-01-01 RX ORDER — ATORVASTATIN CALCIUM 40 MG/1
80 TABLET, FILM COATED ORAL NIGHTLY
Status: DISCONTINUED | OUTPATIENT
Start: 2021-01-01 | End: 2021-01-01

## 2021-01-01 RX ORDER — SEVELAMER CARBONATE 800 MG/1
800 TABLET, FILM COATED ORAL
Status: DISCONTINUED | OUTPATIENT
Start: 2021-01-01 | End: 2021-01-01 | Stop reason: HOSPADM

## 2021-01-01 RX ORDER — DOPAMINE HYDROCHLORIDE 160 MG/100ML
2-20 INJECTION, SOLUTION INTRAVENOUS
Status: DISCONTINUED | OUTPATIENT
Start: 2021-01-01 | End: 2021-01-01

## 2021-01-01 RX ORDER — MIDODRINE HYDROCHLORIDE 5 MG/1
10 TABLET ORAL
COMMUNITY
End: 2021-01-01 | Stop reason: HOSPADM

## 2021-01-01 RX ORDER — ACETAMINOPHEN 325 MG/1
650 TABLET ORAL EVERY 4 HOURS PRN
Status: DISCONTINUED | OUTPATIENT
Start: 2021-01-01 | End: 2021-01-01 | Stop reason: HOSPADM

## 2021-01-01 RX ORDER — DOPAMINE HYDROCHLORIDE 160 MG/100ML
2-20 INJECTION, SOLUTION INTRAVENOUS
Status: DISPENSED | OUTPATIENT
Start: 2021-01-01 | End: 2021-01-01

## 2021-01-01 RX ORDER — KETAMINE HCL IN NACL, ISO-OSM 100MG/10ML
SYRINGE (ML) INJECTION
Status: COMPLETED
Start: 2021-01-01 | End: 2021-01-01

## 2021-01-01 RX ORDER — SEVELAMER CARBONATE FOR ORAL SUSPENSION 800 MG/1
800 POWDER, FOR SUSPENSION ORAL
Status: DISCONTINUED | OUTPATIENT
Start: 2021-01-01 | End: 2021-01-01

## 2021-01-01 RX ORDER — ALBUTEROL SULFATE 90 UG/1
2 AEROSOL, METERED RESPIRATORY (INHALATION) ONCE
Status: COMPLETED | OUTPATIENT
Start: 2021-01-01 | End: 2021-01-01

## 2021-01-01 RX ORDER — CAFFEINE CITRATE 20 MG/ML
60 SOLUTION ORAL EVERY 24 HOURS
Status: DISCONTINUED | OUTPATIENT
Start: 2021-01-01 | End: 2021-01-01 | Stop reason: HOSPADM

## 2021-01-01 RX ORDER — HALOPERIDOL 5 MG/ML
5 INJECTION INTRAMUSCULAR ONCE
Status: COMPLETED | OUTPATIENT
Start: 2021-01-01 | End: 2021-01-01

## 2021-01-01 RX ORDER — MIDAZOLAM IN NACL,ISO-OSMOT/PF 50 MG/50ML
1-10 INFUSION BOTTLE (ML) INTRAVENOUS
Status: DISCONTINUED | OUTPATIENT
Start: 2021-01-01 | End: 2021-01-01 | Stop reason: HOSPADM

## 2021-01-01 RX ORDER — HEPARIN SODIUM 10000 [USP'U]/100ML
15 INJECTION, SOLUTION INTRAVENOUS
Status: DISCONTINUED | OUTPATIENT
Start: 2021-01-01 | End: 2021-01-01

## 2021-01-01 RX ORDER — CAFFEINE CITRATE 20 MG/ML
60 SOLUTION ORAL EVERY 24 HOURS
Qty: 90 ML | Refills: 0 | Status: SHIPPED | OUTPATIENT
Start: 2021-01-01 | End: 2021-01-01

## 2021-01-01 RX ORDER — ACETAMINOPHEN 650 MG/1
650 SUPPOSITORY RECTAL EVERY 4 HOURS PRN
Status: DISCONTINUED | OUTPATIENT
Start: 2021-01-01 | End: 2021-01-01 | Stop reason: HOSPADM

## 2021-01-01 RX ORDER — CAFFEINE CITRATE 20 MG/ML
60 SOLUTION ORAL ONCE
Status: COMPLETED | OUTPATIENT
Start: 2021-01-01 | End: 2021-01-01

## 2021-01-01 RX ORDER — MIDAZOLAM HYDROCHLORIDE 1 MG/ML
1 INJECTION INTRAMUSCULAR; INTRAVENOUS ONCE
Status: COMPLETED | OUTPATIENT
Start: 2021-01-01 | End: 2021-01-01

## 2021-01-01 RX ORDER — VANCOMYCIN HYDROCHLORIDE 1 G/200ML
1000 INJECTION, SOLUTION INTRAVENOUS EVERY 24 HOURS
Status: DISCONTINUED | OUTPATIENT
Start: 2021-01-01 | End: 2021-01-01

## 2021-01-01 RX ADMIN — PHENYLEPHRINE HYDROCHLORIDE 3 MCG/KG/MIN: 10 INJECTION INTRAVENOUS at 15:54

## 2021-01-01 RX ADMIN — MIDODRINE HYDROCHLORIDE 15 MG: 10 TABLET ORAL at 17:31

## 2021-01-01 RX ADMIN — PROPOFOL 50 MCG/KG/MIN: 10 INJECTION, EMULSION INTRAVENOUS at 08:09

## 2021-01-01 RX ADMIN — PROPOFOL 50 MCG/KG/MIN: 10 INJECTION, EMULSION INTRAVENOUS at 17:37

## 2021-01-01 RX ADMIN — HALOPERIDOL LACTATE 5 MG: 5 INJECTION, SOLUTION INTRAMUSCULAR at 01:35

## 2021-01-01 RX ADMIN — Medication 300 MCG/HR: at 16:00

## 2021-01-01 RX ADMIN — DEXTROSE MONOHYDRATE 25 G: 25 INJECTION, SOLUTION INTRAVENOUS at 05:24

## 2021-01-01 RX ADMIN — SEVELAMER CARBONATE 0.8 G: 0.8 POWDER, FOR SUSPENSION ORAL at 17:45

## 2021-01-01 RX ADMIN — PROPOFOL 50 MCG/KG/MIN: 10 INJECTION, EMULSION INTRAVENOUS at 17:20

## 2021-01-01 RX ADMIN — VANCOMYCIN HYDROCHLORIDE 1000 MG: 1 INJECTION, SOLUTION INTRAVENOUS at 19:28

## 2021-01-01 RX ADMIN — CALCIUM CHLORIDE, MAGNESIUM CHLORIDE, SODIUM CHLORIDE, SODIUM BICARBONATE, POTASSIUM CHLORIDE AND SODIUM PHOSPHATE DIBASIC DIHYDRATE 1000 ML/HR: 3.68; 3.05; 6.34; 3.09; .314; .187 INJECTION INTRAVENOUS at 02:44

## 2021-01-01 RX ADMIN — CALCIUM CHLORIDE, MAGNESIUM CHLORIDE, SODIUM CHLORIDE, SODIUM BICARBONATE, POTASSIUM CHLORIDE AND SODIUM PHOSPHATE DIBASIC DIHYDRATE 1000 ML/HR: 3.68; 3.05; 6.34; 3.09; .314; .187 INJECTION INTRAVENOUS at 23:08

## 2021-01-01 RX ADMIN — HEPARIN SODIUM 1600 UNITS: 1000 INJECTION, SOLUTION INTRAVENOUS; SUBCUTANEOUS at 11:37

## 2021-01-01 RX ADMIN — SODIUM CHLORIDE, PRESERVATIVE FREE 10 ML: 5 INJECTION INTRAVENOUS at 20:59

## 2021-01-01 RX ADMIN — ALBUMIN HUMAN 12.5 G: 0.25 SOLUTION INTRAVENOUS at 21:07

## 2021-01-01 RX ADMIN — PROPOFOL 50 MCG/KG/MIN: 10 INJECTION, EMULSION INTRAVENOUS at 17:34

## 2021-01-01 RX ADMIN — APIXABAN 2.5 MG: 2.5 TABLET, FILM COATED ORAL at 12:39

## 2021-01-01 RX ADMIN — MIDODRINE HYDROCHLORIDE 15 MG: 10 TABLET ORAL at 09:36

## 2021-01-01 RX ADMIN — SEVELAMER CARBONATE 1.6 G: 800 POWDER, FOR SUSPENSION ORAL at 17:05

## 2021-01-01 RX ADMIN — ATORVASTATIN CALCIUM 80 MG: 40 TABLET, FILM COATED ORAL at 21:56

## 2021-01-01 RX ADMIN — PHENYLEPHRINE HYDROCHLORIDE 0.5 MCG/KG/MIN: 10 INJECTION INTRAVENOUS at 02:59

## 2021-01-01 RX ADMIN — APIXABAN 2.5 MG: 2.5 TABLET, FILM COATED ORAL at 21:56

## 2021-01-01 RX ADMIN — Medication 10 MG/HR: at 06:10

## 2021-01-01 RX ADMIN — CALCIUM CHLORIDE, MAGNESIUM CHLORIDE, SODIUM CHLORIDE, SODIUM BICARBONATE, POTASSIUM CHLORIDE AND SODIUM PHOSPHATE DIBASIC DIHYDRATE 1000 ML/HR: 3.68; 3.05; 6.34; 3.09; .314; .187 INJECTION INTRAVENOUS at 10:21

## 2021-01-01 RX ADMIN — PROPOFOL 50 MCG/KG/MIN: 10 INJECTION, EMULSION INTRAVENOUS at 16:36

## 2021-01-01 RX ADMIN — DOPAMINE HYDROCHLORIDE 12 MCG/KG/MIN: 40 INJECTION, SOLUTION, CONCENTRATE INTRAVENOUS at 04:24

## 2021-01-01 RX ADMIN — Medication: at 12:03

## 2021-01-01 RX ADMIN — PROPOFOL 50 MCG/KG/MIN: 10 INJECTION, EMULSION INTRAVENOUS at 16:48

## 2021-01-01 RX ADMIN — DEXTROSE AND SODIUM CHLORIDE 100 ML/HR: 5; 900 INJECTION, SOLUTION INTRAVENOUS at 11:14

## 2021-01-01 RX ADMIN — CALCIUM CHLORIDE, MAGNESIUM CHLORIDE, SODIUM CHLORIDE, SODIUM BICARBONATE, POTASSIUM CHLORIDE AND SODIUM PHOSPHATE DIBASIC DIHYDRATE 1000 ML/HR: 3.68; 3.05; 6.34; 3.09; .314; .187 INJECTION INTRAVENOUS at 22:16

## 2021-01-01 RX ADMIN — SODIUM CHLORIDE, PRESERVATIVE FREE 10 ML: 5 INJECTION INTRAVENOUS at 20:18

## 2021-01-01 RX ADMIN — CALCIUM CHLORIDE, MAGNESIUM CHLORIDE, SODIUM CHLORIDE, SODIUM BICARBONATE, POTASSIUM CHLORIDE AND SODIUM PHOSPHATE DIBASIC DIHYDRATE 1000 ML/HR: 3.68; 3.05; 6.34; 3.09; .314; .187 INJECTION INTRAVENOUS at 06:45

## 2021-01-01 RX ADMIN — PHENYLEPHRINE HYDROCHLORIDE 3 MCG/KG/MIN: 10 INJECTION INTRAVENOUS at 04:16

## 2021-01-01 RX ADMIN — ASPIRIN 81 MG CHEWABLE TABLET 81 MG: 81 TABLET CHEWABLE at 08:34

## 2021-01-01 RX ADMIN — PROPOFOL 50 MCG/KG/MIN: 10 INJECTION, EMULSION INTRAVENOUS at 06:05

## 2021-01-01 RX ADMIN — PROPOFOL 50 MCG/KG/MIN: 10 INJECTION, EMULSION INTRAVENOUS at 17:32

## 2021-01-01 RX ADMIN — PANTOPRAZOLE SODIUM 40 MG: 40 TABLET, DELAYED RELEASE ORAL at 04:34

## 2021-01-01 RX ADMIN — DOPAMINE HYDROCHLORIDE 14 MCG/KG/MIN: 40 INJECTION, SOLUTION, CONCENTRATE INTRAVENOUS at 21:13

## 2021-01-01 RX ADMIN — Medication 300 MCG/HR: at 22:54

## 2021-01-01 RX ADMIN — MIDODRINE HYDROCHLORIDE 15 MG: 10 TABLET ORAL at 09:52

## 2021-01-01 RX ADMIN — CHLORHEXIDINE GLUCONATE 15 ML: 1.2 SOLUTION ORAL at 09:19

## 2021-01-01 RX ADMIN — PROPOFOL 50 MCG/KG/MIN: 10 INJECTION, EMULSION INTRAVENOUS at 07:05

## 2021-01-01 RX ADMIN — ANTICOAGULANT CITRATE DEXTROSE SOLUTION FORMULA A 1.6 ML: 12.25; 11; 3.65 SOLUTION INTRAVENOUS at 18:17

## 2021-01-01 RX ADMIN — VANCOMYCIN HYDROCHLORIDE 1000 MG: 1 INJECTION, SOLUTION INTRAVENOUS at 19:36

## 2021-01-01 RX ADMIN — CHLORHEXIDINE GLUCONATE 15 ML: 1.2 SOLUTION ORAL at 08:56

## 2021-01-01 RX ADMIN — OXYMETAZOLINE HYDROCHLORIDE 2 SPRAY: 5 SPRAY NASAL at 09:57

## 2021-01-01 RX ADMIN — CHLORHEXIDINE GLUCONATE 15 ML: 1.2 SOLUTION ORAL at 09:57

## 2021-01-01 RX ADMIN — CALCIUM CHLORIDE, MAGNESIUM CHLORIDE, SODIUM CHLORIDE, SODIUM BICARBONATE, POTASSIUM CHLORIDE AND SODIUM PHOSPHATE DIBASIC DIHYDRATE 1000 ML/HR: 3.68; 3.05; 6.34; 3.09; .314; .187 INJECTION INTRAVENOUS at 05:27

## 2021-01-01 RX ADMIN — PROPOFOL 50 MCG/KG/MIN: 10 INJECTION, EMULSION INTRAVENOUS at 16:21

## 2021-01-01 RX ADMIN — Medication 0.3 MCG/KG/MIN: at 06:10

## 2021-01-01 RX ADMIN — CHLORHEXIDINE GLUCONATE 15 ML: 1.2 SOLUTION ORAL at 20:59

## 2021-01-01 RX ADMIN — CHLORHEXIDINE GLUCONATE 15 ML: 1.2 SOLUTION ORAL at 08:02

## 2021-01-01 RX ADMIN — MIDODRINE HYDROCHLORIDE 15 MG: 10 TABLET ORAL at 05:49

## 2021-01-01 RX ADMIN — PROPOFOL 50 MCG/KG/MIN: 10 INJECTION, EMULSION INTRAVENOUS at 20:30

## 2021-01-01 RX ADMIN — PROPOFOL 50 MCG/KG/MIN: 10 INJECTION, EMULSION INTRAVENOUS at 17:36

## 2021-01-01 RX ADMIN — Medication 300 MCG/HR: at 06:59

## 2021-01-01 RX ADMIN — PHENYLEPHRINE HYDROCHLORIDE 3 MCG/KG/MIN: 10 INJECTION INTRAVENOUS at 17:44

## 2021-01-01 RX ADMIN — DOPAMINE HYDROCHLORIDE 14 MCG/KG/MIN: 40 INJECTION, SOLUTION, CONCENTRATE INTRAVENOUS at 02:20

## 2021-01-01 RX ADMIN — PHENYLEPHRINE HYDROCHLORIDE 0.5 MCG/KG/MIN: 10 INJECTION INTRAVENOUS at 10:34

## 2021-01-01 RX ADMIN — CHLORHEXIDINE GLUCONATE 15 ML: 1.2 SOLUTION ORAL at 20:11

## 2021-01-01 RX ADMIN — CALCIUM CHLORIDE, MAGNESIUM CHLORIDE, SODIUM CHLORIDE, SODIUM BICARBONATE, POTASSIUM CHLORIDE AND SODIUM PHOSPHATE DIBASIC DIHYDRATE 1000 ML/HR: 3.68; 3.05; 6.34; 3.09; .314; .187 INJECTION INTRAVENOUS at 11:28

## 2021-01-01 RX ADMIN — NOREPINEPHRINE BITARTRATE 0.32 MCG/KG/MIN: 1 INJECTION, SOLUTION, CONCENTRATE INTRAVENOUS at 06:10

## 2021-01-01 RX ADMIN — HEPARIN SODIUM 18 UNITS/KG/HR: 10000 INJECTION, SOLUTION INTRAVENOUS at 16:21

## 2021-01-01 RX ADMIN — MIDODRINE HYDROCHLORIDE 15 MG: 10 TABLET ORAL at 21:41

## 2021-01-01 RX ADMIN — MIDODRINE HYDROCHLORIDE 15 MG: 10 TABLET ORAL at 18:09

## 2021-01-01 RX ADMIN — DEXTROSE AND SODIUM CHLORIDE 100 ML/HR: 5; 900 INJECTION, SOLUTION INTRAVENOUS at 08:34

## 2021-01-01 RX ADMIN — Medication 300 MCG/HR: at 17:54

## 2021-01-01 RX ADMIN — SODIUM CHLORIDE, PRESERVATIVE FREE 10 ML: 5 INJECTION INTRAVENOUS at 08:02

## 2021-01-01 RX ADMIN — CALCIUM CHLORIDE, MAGNESIUM CHLORIDE, SODIUM CHLORIDE, SODIUM BICARBONATE, POTASSIUM CHLORIDE AND SODIUM PHOSPHATE DIBASIC DIHYDRATE 1000 ML/HR: 3.68; 3.05; 6.34; 3.09; .314; .187 INJECTION INTRAVENOUS at 14:15

## 2021-01-01 RX ADMIN — Medication 10 MG/HR: at 10:45

## 2021-01-01 RX ADMIN — MIDODRINE HYDROCHLORIDE 15 MG: 10 TABLET ORAL at 13:23

## 2021-01-01 RX ADMIN — Medication 300 MCG/HR: at 11:02

## 2021-01-01 RX ADMIN — SEVELAMER CARBONATE 0.8 G: 0.8 POWDER, FOR SUSPENSION ORAL at 09:53

## 2021-01-01 RX ADMIN — SODIUM CHLORIDE, PRESERVATIVE FREE 10 ML: 5 INJECTION INTRAVENOUS at 19:56

## 2021-01-01 RX ADMIN — CALCIUM CHLORIDE, MAGNESIUM CHLORIDE, SODIUM CHLORIDE, SODIUM BICARBONATE, POTASSIUM CHLORIDE AND SODIUM PHOSPHATE DIBASIC DIHYDRATE 1000 ML/HR: 3.68; 3.05; 6.34; 3.09; .314; .187 INJECTION INTRAVENOUS at 14:58

## 2021-01-01 RX ADMIN — MIDODRINE HYDROCHLORIDE 15 MG: 10 TABLET ORAL at 05:57

## 2021-01-01 RX ADMIN — SODIUM CHLORIDE, PRESERVATIVE FREE 10 ML: 5 INJECTION INTRAVENOUS at 09:19

## 2021-01-01 RX ADMIN — PANTOPRAZOLE SODIUM 40 MG: 40 TABLET, DELAYED RELEASE ORAL at 08:12

## 2021-01-01 RX ADMIN — SEVELAMER CARBONATE 1.6 G: 800 POWDER, FOR SUSPENSION ORAL at 08:56

## 2021-01-01 RX ADMIN — FAMOTIDINE 20 MG: 20 TABLET, FILM COATED ORAL at 08:26

## 2021-01-01 RX ADMIN — Medication 10 MG/HR: at 20:41

## 2021-01-01 RX ADMIN — CALCIUM CHLORIDE, MAGNESIUM CHLORIDE, SODIUM CHLORIDE, SODIUM BICARBONATE, POTASSIUM CHLORIDE AND SODIUM PHOSPHATE DIBASIC DIHYDRATE 1000 ML/HR: 3.68; 3.05; 6.34; 3.09; .314; .187 INJECTION INTRAVENOUS at 05:11

## 2021-01-01 RX ADMIN — ASPIRIN 81 MG CHEWABLE TABLET 324 MG: 81 TABLET CHEWABLE at 17:54

## 2021-01-01 RX ADMIN — CHLORHEXIDINE GLUCONATE 15 ML: 1.2 SOLUTION ORAL at 20:09

## 2021-01-01 RX ADMIN — HYDROCORTISONE SODIUM SUCCINATE 25 MG: 100 INJECTION, POWDER, FOR SOLUTION INTRAMUSCULAR; INTRAVENOUS at 01:10

## 2021-01-01 RX ADMIN — ATORVASTATIN CALCIUM 80 MG: 40 TABLET, FILM COATED ORAL at 20:01

## 2021-01-01 RX ADMIN — PROPOFOL 50 MCG/KG/MIN: 10 INJECTION, EMULSION INTRAVENOUS at 02:07

## 2021-01-01 RX ADMIN — Medication 300 MCG/HR: at 10:10

## 2021-01-01 RX ADMIN — PROPOFOL 50 MCG/KG/MIN: 10 INJECTION, EMULSION INTRAVENOUS at 11:47

## 2021-01-01 RX ADMIN — PROPOFOL 50 MCG/KG/MIN: 10 INJECTION, EMULSION INTRAVENOUS at 00:58

## 2021-01-01 RX ADMIN — HYDROCODONE BITARTRATE AND ACETAMINOPHEN 1 TABLET: 5; 325 TABLET ORAL at 04:34

## 2021-01-01 RX ADMIN — MIDODRINE HYDROCHLORIDE 15 MG: 10 TABLET ORAL at 16:31

## 2021-01-01 RX ADMIN — CALCIUM CHLORIDE, MAGNESIUM CHLORIDE, SODIUM CHLORIDE, SODIUM BICARBONATE, POTASSIUM CHLORIDE AND SODIUM PHOSPHATE DIBASIC DIHYDRATE 1000 ML/HR: 3.68; 3.05; 6.34; 3.09; .314; .187 INJECTION INTRAVENOUS at 21:43

## 2021-01-01 RX ADMIN — DEXTROSE AND SODIUM CHLORIDE 50 ML/HR: 5; 900 INJECTION, SOLUTION INTRAVENOUS at 17:03

## 2021-01-01 RX ADMIN — Medication 0.02 MCG/KG/MIN: at 23:05

## 2021-01-01 RX ADMIN — PROPOFOL 50 MCG/KG/MIN: 10 INJECTION, EMULSION INTRAVENOUS at 06:37

## 2021-01-01 RX ADMIN — CALCIUM CHLORIDE, MAGNESIUM CHLORIDE, SODIUM CHLORIDE, SODIUM BICARBONATE, POTASSIUM CHLORIDE AND SODIUM PHOSPHATE DIBASIC DIHYDRATE 1000 ML/HR: 3.68; 3.05; 6.34; 3.09; .314; .187 INJECTION INTRAVENOUS at 09:02

## 2021-01-01 RX ADMIN — ASPIRIN 81 MG: 81 TABLET, CHEWABLE ORAL at 12:45

## 2021-01-01 RX ADMIN — PROPOFOL 50 MCG/KG/MIN: 10 INJECTION, EMULSION INTRAVENOUS at 03:17

## 2021-01-01 RX ADMIN — TAZOBACTAM SODIUM AND PIPERACILLIN SODIUM 3.38 G: 375; 3 INJECTION, SOLUTION INTRAVENOUS at 21:45

## 2021-01-01 RX ADMIN — SODIUM CHLORIDE, PRESERVATIVE FREE 10 ML: 5 INJECTION INTRAVENOUS at 12:40

## 2021-01-01 RX ADMIN — SODIUM CHLORIDE, PRESERVATIVE FREE 10 ML: 5 INJECTION INTRAVENOUS at 08:01

## 2021-01-01 RX ADMIN — FAMOTIDINE 20 MG: 20 TABLET, FILM COATED ORAL at 20:09

## 2021-01-01 RX ADMIN — Medication 10 MG/HR: at 07:05

## 2021-01-01 RX ADMIN — CALCIUM CHLORIDE, MAGNESIUM CHLORIDE, SODIUM CHLORIDE, SODIUM BICARBONATE, POTASSIUM CHLORIDE AND SODIUM PHOSPHATE DIBASIC DIHYDRATE 1000 ML/HR: 3.68; 3.05; 6.34; 3.09; .314; .187 INJECTION INTRAVENOUS at 01:45

## 2021-01-01 RX ADMIN — SODIUM CHLORIDE, PRESERVATIVE FREE 10 ML: 5 INJECTION INTRAVENOUS at 08:48

## 2021-01-01 RX ADMIN — DEXTROSE AND SODIUM CHLORIDE 50 ML/HR: 5; 900 INJECTION, SOLUTION INTRAVENOUS at 10:22

## 2021-01-01 RX ADMIN — ATORVASTATIN CALCIUM 80 MG: 40 TABLET, FILM COATED ORAL at 20:16

## 2021-01-01 RX ADMIN — PHENYLEPHRINE HYDROCHLORIDE 3 MCG/KG/MIN: 10 INJECTION INTRAVENOUS at 23:06

## 2021-01-01 RX ADMIN — CALCIUM CHLORIDE, MAGNESIUM CHLORIDE, SODIUM CHLORIDE, SODIUM BICARBONATE, POTASSIUM CHLORIDE AND SODIUM PHOSPHATE DIBASIC DIHYDRATE 1000 ML/HR: 3.68; 3.05; 6.34; 3.09; .314; .187 INJECTION INTRAVENOUS at 20:44

## 2021-01-01 RX ADMIN — Medication 300 MCG/HR: at 01:06

## 2021-01-01 RX ADMIN — APIXABAN 2.5 MG: 2.5 TABLET, FILM COATED ORAL at 20:18

## 2021-01-01 RX ADMIN — Medication 300 MCG/HR: at 09:16

## 2021-01-01 RX ADMIN — CALCIUM CHLORIDE, MAGNESIUM CHLORIDE, SODIUM CHLORIDE, SODIUM BICARBONATE, POTASSIUM CHLORIDE AND SODIUM PHOSPHATE DIBASIC DIHYDRATE 1000 ML/HR: 3.68; 3.05; 6.34; 3.09; .314; .187 INJECTION INTRAVENOUS at 13:33

## 2021-01-01 RX ADMIN — ALBUMIN HUMAN 12.5 G: 0.25 SOLUTION INTRAVENOUS at 21:41

## 2021-01-01 RX ADMIN — Medication 10 MG/HR: at 11:51

## 2021-01-01 RX ADMIN — TAZOBACTAM SODIUM AND PIPERACILLIN SODIUM 3.38 G: 375; 3 INJECTION, SOLUTION INTRAVENOUS at 21:37

## 2021-01-01 RX ADMIN — ACETAMINOPHEN 650 MG: 325 TABLET, FILM COATED ORAL at 07:38

## 2021-01-01 RX ADMIN — HYDROCODONE BITARTRATE AND ACETAMINOPHEN 1 TABLET: 5; 325 TABLET ORAL at 10:36

## 2021-01-01 RX ADMIN — CHLORHEXIDINE GLUCONATE 15 ML: 1.2 SOLUTION ORAL at 08:40

## 2021-01-01 RX ADMIN — CALCIUM CHLORIDE, MAGNESIUM CHLORIDE, SODIUM CHLORIDE, SODIUM BICARBONATE, POTASSIUM CHLORIDE AND SODIUM PHOSPHATE DIBASIC DIHYDRATE 1000 ML/HR: 3.68; 3.05; 6.34; 3.09; .314; .187 INJECTION INTRAVENOUS at 06:43

## 2021-01-01 RX ADMIN — MIDODRINE HYDROCHLORIDE 5 MG: 5 TABLET ORAL at 12:02

## 2021-01-01 RX ADMIN — MIDODRINE HYDROCHLORIDE 15 MG: 10 TABLET ORAL at 13:33

## 2021-01-01 RX ADMIN — Medication 300 MCG/HR: at 21:57

## 2021-01-01 RX ADMIN — ASPIRIN 81 MG: 81 TABLET, CHEWABLE ORAL at 08:29

## 2021-01-01 RX ADMIN — CALCIUM CHLORIDE, MAGNESIUM CHLORIDE, SODIUM CHLORIDE, SODIUM BICARBONATE, POTASSIUM CHLORIDE AND SODIUM PHOSPHATE DIBASIC DIHYDRATE 1000 ML/HR: 3.68; 3.05; 6.34; 3.09; .314; .187 INJECTION INTRAVENOUS at 03:17

## 2021-01-01 RX ADMIN — MIDODRINE HYDROCHLORIDE 10 MG: 10 TABLET ORAL at 16:52

## 2021-01-01 RX ADMIN — CALCIUM CHLORIDE, MAGNESIUM CHLORIDE, SODIUM CHLORIDE, SODIUM BICARBONATE, POTASSIUM CHLORIDE AND SODIUM PHOSPHATE DIBASIC DIHYDRATE 1000 ML/HR: 3.68; 3.05; 6.34; 3.09; .314; .187 INJECTION INTRAVENOUS at 04:11

## 2021-01-01 RX ADMIN — ANTICOAGULANT CITRATE DEXTROSE SOLUTION FORMULA A 1.6 ML: 12.25; 11; 3.65 SOLUTION INTRAVENOUS at 21:32

## 2021-01-01 RX ADMIN — PHENYLEPHRINE HYDROCHLORIDE 3 MCG/KG/MIN: 10 INJECTION INTRAVENOUS at 14:29

## 2021-01-01 RX ADMIN — Medication 30 MG: at 02:28

## 2021-01-01 RX ADMIN — ACETAMINOPHEN 650 MG: 325 TABLET, FILM COATED ORAL at 19:56

## 2021-01-01 RX ADMIN — CALCIUM CHLORIDE, MAGNESIUM CHLORIDE, SODIUM CHLORIDE, SODIUM BICARBONATE, POTASSIUM CHLORIDE AND SODIUM PHOSPHATE DIBASIC DIHYDRATE 1000 ML/HR: 3.68; 3.05; 6.34; 3.09; .314; .187 INJECTION INTRAVENOUS at 23:29

## 2021-01-01 RX ADMIN — MIDODRINE HYDROCHLORIDE 15 MG: 10 TABLET ORAL at 07:41

## 2021-01-01 RX ADMIN — SODIUM CHLORIDE, PRESERVATIVE FREE 10 ML: 5 INJECTION INTRAVENOUS at 20:22

## 2021-01-01 RX ADMIN — PHENYLEPHRINE HYDROCHLORIDE 3 MCG/KG/MIN: 10 INJECTION INTRAVENOUS at 11:02

## 2021-01-01 RX ADMIN — PROPOFOL 50 MCG/KG/MIN: 10 INJECTION, EMULSION INTRAVENOUS at 22:48

## 2021-01-01 RX ADMIN — MIDODRINE HYDROCHLORIDE 15 MG: 10 TABLET ORAL at 08:02

## 2021-01-01 RX ADMIN — CALCIUM CHLORIDE, MAGNESIUM CHLORIDE, SODIUM CHLORIDE, SODIUM BICARBONATE, POTASSIUM CHLORIDE AND SODIUM PHOSPHATE DIBASIC DIHYDRATE 1000 ML/HR: 3.68; 3.05; 6.34; 3.09; .314; .187 INJECTION INTRAVENOUS at 12:48

## 2021-01-01 RX ADMIN — NOREPINEPHRINE BITARTRATE 0.3 MCG/KG/MIN: 1 INJECTION, SOLUTION, CONCENTRATE INTRAVENOUS at 09:49

## 2021-01-01 RX ADMIN — PROPOFOL 50 MCG/KG/MIN: 10 INJECTION, EMULSION INTRAVENOUS at 06:10

## 2021-01-01 RX ADMIN — CALCIUM CHLORIDE, MAGNESIUM CHLORIDE, SODIUM CHLORIDE, SODIUM BICARBONATE, POTASSIUM CHLORIDE AND SODIUM PHOSPHATE DIBASIC DIHYDRATE 1000 ML/HR: 3.68; 3.05; 6.34; 3.09; .314; .187 INJECTION INTRAVENOUS at 08:25

## 2021-01-01 RX ADMIN — PHENYLEPHRINE HYDROCHLORIDE 3 MCG/KG/MIN: 10 INJECTION INTRAVENOUS at 08:27

## 2021-01-01 RX ADMIN — PROPOFOL 50 MCG/KG/MIN: 10 INJECTION, EMULSION INTRAVENOUS at 20:16

## 2021-01-01 RX ADMIN — CHLORHEXIDINE GLUCONATE 15 ML: 1.2 SOLUTION ORAL at 08:18

## 2021-01-01 RX ADMIN — HYDROCODONE BITARTRATE AND ACETAMINOPHEN 1 TABLET: 5; 325 TABLET ORAL at 10:16

## 2021-01-01 RX ADMIN — PROPOFOL 50 MCG/KG/MIN: 10 INJECTION, EMULSION INTRAVENOUS at 01:58

## 2021-01-01 RX ADMIN — HYDROCODONE BITARTRATE AND ACETAMINOPHEN 1 TABLET: 5; 325 TABLET ORAL at 20:01

## 2021-01-01 RX ADMIN — Medication 300 MCG/HR: at 13:37

## 2021-01-01 RX ADMIN — APIXABAN 2.5 MG: 2.5 TABLET, FILM COATED ORAL at 21:37

## 2021-01-01 RX ADMIN — CALCIUM CHLORIDE, MAGNESIUM CHLORIDE, SODIUM CHLORIDE, SODIUM BICARBONATE, POTASSIUM CHLORIDE AND SODIUM PHOSPHATE DIBASIC DIHYDRATE 1000 ML/HR: 3.68; 3.05; 6.34; 3.09; .314; .187 INJECTION INTRAVENOUS at 04:40

## 2021-01-01 RX ADMIN — MIDODRINE HYDROCHLORIDE 10 MG: 10 TABLET ORAL at 14:47

## 2021-01-01 RX ADMIN — HYDROCODONE BITARTRATE AND ACETAMINOPHEN 1 TABLET: 5; 325 TABLET ORAL at 21:56

## 2021-01-01 RX ADMIN — CALCIUM CHLORIDE, MAGNESIUM CHLORIDE, SODIUM CHLORIDE, SODIUM BICARBONATE, POTASSIUM CHLORIDE AND SODIUM PHOSPHATE DIBASIC DIHYDRATE 1000 ML/HR: 3.68; 3.05; 6.34; 3.09; .314; .187 INJECTION INTRAVENOUS at 13:19

## 2021-01-01 RX ADMIN — HYDROCORTISONE SODIUM SUCCINATE 50 MG: 100 INJECTION, POWDER, FOR SOLUTION INTRAMUSCULAR; INTRAVENOUS at 23:19

## 2021-01-01 RX ADMIN — CALCIUM CHLORIDE, MAGNESIUM CHLORIDE, SODIUM CHLORIDE, SODIUM BICARBONATE, POTASSIUM CHLORIDE AND SODIUM PHOSPHATE DIBASIC DIHYDRATE 1000 ML/HR: 3.68; 3.05; 6.34; 3.09; .314; .187 INJECTION INTRAVENOUS at 00:12

## 2021-01-01 RX ADMIN — MIDODRINE HYDROCHLORIDE 15 MG: 10 TABLET ORAL at 21:46

## 2021-01-01 RX ADMIN — CALCIUM CHLORIDE, MAGNESIUM CHLORIDE, SODIUM CHLORIDE, SODIUM BICARBONATE, POTASSIUM CHLORIDE AND SODIUM PHOSPHATE DIBASIC DIHYDRATE 1000 ML/HR: 3.68; 3.05; 6.34; 3.09; .314; .187 INJECTION INTRAVENOUS at 22:47

## 2021-01-01 RX ADMIN — DOPAMINE HYDROCHLORIDE 14 MCG/KG/MIN: 40 INJECTION, SOLUTION, CONCENTRATE INTRAVENOUS at 02:56

## 2021-01-01 RX ADMIN — DEXTROSE MONOHYDRATE 25 G: 25 INJECTION, SOLUTION INTRAVENOUS at 23:40

## 2021-01-01 RX ADMIN — SEVELAMER CARBONATE 1.6 G: 800 POWDER, FOR SUSPENSION ORAL at 17:50

## 2021-01-01 RX ADMIN — CALCIUM CHLORIDE, MAGNESIUM CHLORIDE, SODIUM CHLORIDE, SODIUM BICARBONATE, POTASSIUM CHLORIDE AND SODIUM PHOSPHATE DIBASIC DIHYDRATE 1000 ML/HR: 3.68; 3.05; 6.34; 3.09; .314; .187 INJECTION INTRAVENOUS at 00:00

## 2021-01-01 RX ADMIN — APIXABAN 2.5 MG: 2.5 TABLET, FILM COATED ORAL at 19:54

## 2021-01-01 RX ADMIN — CALCIUM CHLORIDE, MAGNESIUM CHLORIDE, SODIUM CHLORIDE, SODIUM BICARBONATE, POTASSIUM CHLORIDE AND SODIUM PHOSPHATE DIBASIC DIHYDRATE 1000 ML/HR: 3.68; 3.05; 6.34; 3.09; .314; .187 INJECTION INTRAVENOUS at 23:12

## 2021-01-01 RX ADMIN — Medication 0.12 MCG/KG/MIN: at 06:55

## 2021-01-01 RX ADMIN — CHLORHEXIDINE GLUCONATE 15 ML: 1.2 SOLUTION ORAL at 09:05

## 2021-01-01 RX ADMIN — PROPOFOL 50 MCG/KG/MIN: 10 INJECTION, EMULSION INTRAVENOUS at 08:00

## 2021-01-01 RX ADMIN — HYDROCODONE BITARTRATE AND ACETAMINOPHEN 1 TABLET: 5; 325 TABLET ORAL at 17:54

## 2021-01-01 RX ADMIN — Medication 300 MCG/HR: at 17:34

## 2021-01-01 RX ADMIN — Medication 300 MCG/HR: at 10:32

## 2021-01-01 RX ADMIN — SODIUM CHLORIDE, PRESERVATIVE FREE 10 ML: 5 INJECTION INTRAVENOUS at 08:09

## 2021-01-01 RX ADMIN — AZITHROMYCIN 500 MG: 500 INJECTION, POWDER, LYOPHILIZED, FOR SOLUTION INTRAVENOUS at 18:54

## 2021-01-01 RX ADMIN — SEVELAMER CARBONATE 1.6 G: 800 POWDER, FOR SUSPENSION ORAL at 08:26

## 2021-01-01 RX ADMIN — TAZOBACTAM SODIUM AND PIPERACILLIN SODIUM 3.38 G: 375; 3 INJECTION, SOLUTION INTRAVENOUS at 13:30

## 2021-01-01 RX ADMIN — CALCIUM CHLORIDE, MAGNESIUM CHLORIDE, SODIUM CHLORIDE, SODIUM BICARBONATE, POTASSIUM CHLORIDE AND SODIUM PHOSPHATE DIBASIC DIHYDRATE 1000 ML/HR: 3.68; 3.05; 6.34; 3.09; .314; .187 INJECTION INTRAVENOUS at 11:29

## 2021-01-01 RX ADMIN — SODIUM CHLORIDE, PRESERVATIVE FREE 10 ML: 5 INJECTION INTRAVENOUS at 13:41

## 2021-01-01 RX ADMIN — PROPOFOL 50 MCG/KG/MIN: 10 INJECTION, EMULSION INTRAVENOUS at 06:07

## 2021-01-01 RX ADMIN — CALCIUM CHLORIDE, MAGNESIUM CHLORIDE, SODIUM CHLORIDE, SODIUM BICARBONATE, POTASSIUM CHLORIDE AND SODIUM PHOSPHATE DIBASIC DIHYDRATE 1000 ML/HR: 3.68; 3.05; 6.34; 3.09; .314; .187 INJECTION INTRAVENOUS at 08:27

## 2021-01-01 RX ADMIN — SODIUM CHLORIDE, PRESERVATIVE FREE 10 ML: 5 INJECTION INTRAVENOUS at 20:53

## 2021-01-01 RX ADMIN — ANTACID TABLETS 1 TABLET: 500 TABLET, CHEWABLE ORAL at 15:36

## 2021-01-01 RX ADMIN — ALBUMIN HUMAN 12.5 G: 0.25 SOLUTION INTRAVENOUS at 12:22

## 2021-01-01 RX ADMIN — Medication 300 MCG/HR: at 02:26

## 2021-01-01 RX ADMIN — CALCIUM CHLORIDE, MAGNESIUM CHLORIDE, SODIUM CHLORIDE, SODIUM BICARBONATE, POTASSIUM CHLORIDE AND SODIUM PHOSPHATE DIBASIC DIHYDRATE 1000 ML/HR: 3.68; 3.05; 6.34; 3.09; .314; .187 INJECTION INTRAVENOUS at 22:57

## 2021-01-01 RX ADMIN — FAMOTIDINE 20 MG: 20 TABLET, FILM COATED ORAL at 08:05

## 2021-01-01 RX ADMIN — ACETAMINOPHEN 650 MG: 325 TABLET, FILM COATED ORAL at 08:16

## 2021-01-01 RX ADMIN — APIXABAN 2.5 MG: 2.5 TABLET, FILM COATED ORAL at 20:08

## 2021-01-01 RX ADMIN — PSEUDOEPHEDRINE HCL 60 MG: 30 TABLET, FILM COATED ORAL at 12:38

## 2021-01-01 RX ADMIN — NOREPINEPHRINE BITARTRATE 0.32 MCG/KG/MIN: 1 INJECTION, SOLUTION, CONCENTRATE INTRAVENOUS at 04:02

## 2021-01-01 RX ADMIN — CHLORHEXIDINE GLUCONATE 15 ML: 1.2 SOLUTION ORAL at 20:16

## 2021-01-01 RX ADMIN — PROPOFOL 50 MCG/KG/MIN: 10 INJECTION, EMULSION INTRAVENOUS at 03:07

## 2021-01-01 RX ADMIN — Medication 10 MG/HR: at 02:07

## 2021-01-01 RX ADMIN — FAMOTIDINE 20 MG: 20 TABLET, FILM COATED ORAL at 09:05

## 2021-01-01 RX ADMIN — Medication 50 MCG/HR: at 04:03

## 2021-01-01 RX ADMIN — Medication 0.3 MCG/KG/MIN: at 14:26

## 2021-01-01 RX ADMIN — TAZOBACTAM SODIUM AND PIPERACILLIN SODIUM 3.38 G: 375; 3 INJECTION, SOLUTION INTRAVENOUS at 13:57

## 2021-01-01 RX ADMIN — DEXTROSE AND SODIUM CHLORIDE 100 ML/HR: 5; 900 INJECTION, SOLUTION INTRAVENOUS at 05:11

## 2021-01-01 RX ADMIN — HEPARIN SODIUM 3200 UNITS: 1000 INJECTION, SOLUTION INTRAVENOUS; SUBCUTANEOUS at 15:38

## 2021-01-01 RX ADMIN — SODIUM CHLORIDE, PRESERVATIVE FREE 10 ML: 5 INJECTION INTRAVENOUS at 09:59

## 2021-01-01 RX ADMIN — ALTEPLASE: 2.2 INJECTION, POWDER, LYOPHILIZED, FOR SOLUTION INTRAVENOUS at 10:33

## 2021-01-01 RX ADMIN — SODIUM ZIRCONIUM CYCLOSILICATE 15 G: 10 POWDER, FOR SUSPENSION ORAL at 14:26

## 2021-01-01 RX ADMIN — ACETAMINOPHEN 650 MG: 325 TABLET, FILM COATED ORAL at 19:22

## 2021-01-01 RX ADMIN — CALCIUM CHLORIDE, MAGNESIUM CHLORIDE, SODIUM CHLORIDE, SODIUM BICARBONATE, POTASSIUM CHLORIDE AND SODIUM PHOSPHATE DIBASIC DIHYDRATE 1000 ML/HR: 3.68; 3.05; 6.34; 3.09; .314; .187 INJECTION INTRAVENOUS at 11:52

## 2021-01-01 RX ADMIN — Medication 10 MG/HR: at 13:02

## 2021-01-01 RX ADMIN — HYDROCORTISONE SODIUM SUCCINATE 50 MG: 100 INJECTION, POWDER, FOR SOLUTION INTRAMUSCULAR; INTRAVENOUS at 18:09

## 2021-01-01 RX ADMIN — MIDODRINE HYDROCHLORIDE 10 MG: 10 TABLET ORAL at 21:35

## 2021-01-01 RX ADMIN — CHLORHEXIDINE GLUCONATE 15 ML: 1.2 SOLUTION ORAL at 08:06

## 2021-01-01 RX ADMIN — CALCIUM CHLORIDE, MAGNESIUM CHLORIDE, SODIUM CHLORIDE, SODIUM BICARBONATE, POTASSIUM CHLORIDE AND SODIUM PHOSPHATE DIBASIC DIHYDRATE 1000 ML/HR: 3.68; 3.05; 6.34; 3.09; .314; .187 INJECTION INTRAVENOUS at 23:30

## 2021-01-01 RX ADMIN — SEVELAMER CARBONATE 1.6 G: 800 POWDER, FOR SUSPENSION ORAL at 11:00

## 2021-01-01 RX ADMIN — SODIUM CHLORIDE 250 ML: 9 INJECTION, SOLUTION INTRAVENOUS at 21:24

## 2021-01-01 RX ADMIN — APIXABAN 2.5 MG: 2.5 TABLET, FILM COATED ORAL at 09:52

## 2021-01-01 RX ADMIN — PROPOFOL 50 MCG/KG/MIN: 10 INJECTION, EMULSION INTRAVENOUS at 21:54

## 2021-01-01 RX ADMIN — DOPAMINE HYDROCHLORIDE 14 MCG/KG/MIN: 40 INJECTION, SOLUTION, CONCENTRATE INTRAVENOUS at 10:45

## 2021-01-01 RX ADMIN — MIDAZOLAM HYDROCHLORIDE 2 MG: 1 INJECTION, SOLUTION INTRAMUSCULAR; INTRAVENOUS at 02:31

## 2021-01-01 RX ADMIN — MIDODRINE HYDROCHLORIDE 15 MG: 10 TABLET ORAL at 06:10

## 2021-01-01 RX ADMIN — ATORVASTATIN CALCIUM 80 MG: 40 TABLET, FILM COATED ORAL at 20:58

## 2021-01-01 RX ADMIN — CALCIUM CHLORIDE, MAGNESIUM CHLORIDE, SODIUM CHLORIDE, SODIUM BICARBONATE, POTASSIUM CHLORIDE AND SODIUM PHOSPHATE DIBASIC DIHYDRATE 1000 ML/HR: 3.68; 3.05; 6.34; 3.09; .314; .187 INJECTION INTRAVENOUS at 16:23

## 2021-01-01 RX ADMIN — PHENYLEPHRINE HYDROCHLORIDE 3 MCG/KG/MIN: 10 INJECTION INTRAVENOUS at 22:00

## 2021-01-01 RX ADMIN — CALCIUM CHLORIDE, MAGNESIUM CHLORIDE, SODIUM CHLORIDE, SODIUM BICARBONATE, POTASSIUM CHLORIDE AND SODIUM PHOSPHATE DIBASIC DIHYDRATE 1000 ML/HR: 3.68; 3.05; 6.34; 3.09; .314; .187 INJECTION INTRAVENOUS at 12:18

## 2021-01-01 RX ADMIN — PANTOPRAZOLE SODIUM 40 MG: 40 TABLET, DELAYED RELEASE ORAL at 05:39

## 2021-01-01 RX ADMIN — SEVELAMER CARBONATE 1.6 G: 800 POWDER, FOR SUSPENSION ORAL at 08:09

## 2021-01-01 RX ADMIN — CHLORHEXIDINE GLUCONATE 15 ML: 1.2 SOLUTION ORAL at 09:58

## 2021-01-01 RX ADMIN — PROPOFOL 50 MCG/KG/MIN: 10 INJECTION, EMULSION INTRAVENOUS at 04:19

## 2021-01-01 RX ADMIN — CAFFEINE CITRATE 60 MG: 20 INJECTION INTRAVENOUS at 12:02

## 2021-01-01 RX ADMIN — MIDODRINE HYDROCHLORIDE 15 MG: 10 TABLET ORAL at 12:02

## 2021-01-01 RX ADMIN — ATORVASTATIN CALCIUM 80 MG: 40 TABLET, FILM COATED ORAL at 20:53

## 2021-01-01 RX ADMIN — SODIUM CHLORIDE, PRESERVATIVE FREE 10 ML: 5 INJECTION INTRAVENOUS at 08:57

## 2021-01-01 RX ADMIN — Medication 300 MCG/HR: at 12:10

## 2021-01-01 RX ADMIN — DOPAMINE HYDROCHLORIDE 14 MCG/KG/MIN: 40 INJECTION, SOLUTION, CONCENTRATE INTRAVENOUS at 08:11

## 2021-01-01 RX ADMIN — CHLORHEXIDINE GLUCONATE 15 ML: 1.2 SOLUTION ORAL at 20:08

## 2021-01-01 RX ADMIN — CHLORHEXIDINE GLUCONATE 15 ML: 1.2 SOLUTION ORAL at 08:34

## 2021-01-01 RX ADMIN — CALCIUM CHLORIDE, MAGNESIUM CHLORIDE, SODIUM CHLORIDE, SODIUM BICARBONATE, POTASSIUM CHLORIDE AND SODIUM PHOSPHATE DIBASIC DIHYDRATE 1000 ML/HR: 3.68; 3.05; 6.34; 3.09; .314; .187 INJECTION INTRAVENOUS at 22:40

## 2021-01-01 RX ADMIN — PHENYLEPHRINE HYDROCHLORIDE 3 MCG/KG/MIN: 10 INJECTION INTRAVENOUS at 18:23

## 2021-01-01 RX ADMIN — DEXTROSE MONOHYDRATE 25 G: 25 INJECTION, SOLUTION INTRAVENOUS at 11:33

## 2021-01-01 RX ADMIN — DEXTROSE MONOHYDRATE 25 G: 25 INJECTION, SOLUTION INTRAVENOUS at 17:50

## 2021-01-01 RX ADMIN — HYDROCODONE BITARTRATE AND ACETAMINOPHEN 1 TABLET: 5; 325 TABLET ORAL at 05:04

## 2021-01-01 RX ADMIN — Medication 300 MCG/HR: at 17:49

## 2021-01-01 RX ADMIN — SODIUM CHLORIDE, PRESERVATIVE FREE 10 ML: 5 INJECTION INTRAVENOUS at 17:48

## 2021-01-01 RX ADMIN — PHENYLEPHRINE HYDROCHLORIDE 0.8 MCG/KG/MIN: 10 INJECTION INTRAVENOUS at 03:07

## 2021-01-01 RX ADMIN — SODIUM CHLORIDE, PRESERVATIVE FREE 10 ML: 5 INJECTION INTRAVENOUS at 08:35

## 2021-01-01 RX ADMIN — CALCIUM CHLORIDE, MAGNESIUM CHLORIDE, SODIUM CHLORIDE, SODIUM BICARBONATE, POTASSIUM CHLORIDE AND SODIUM PHOSPHATE DIBASIC DIHYDRATE 1000 ML/HR: 3.68; 3.05; 6.34; 3.09; .314; .187 INJECTION INTRAVENOUS at 14:13

## 2021-01-01 RX ADMIN — Medication 300 MCG/HR: at 14:24

## 2021-01-01 RX ADMIN — DEXTROSE AND SODIUM CHLORIDE 100 ML/HR: 5; 900 INJECTION, SOLUTION INTRAVENOUS at 22:54

## 2021-01-01 RX ADMIN — ALBUMIN HUMAN 12.5 G: 0.25 SOLUTION INTRAVENOUS at 16:26

## 2021-01-01 RX ADMIN — HYDROCODONE BITARTRATE AND ACETAMINOPHEN 1 TABLET: 5; 325 TABLET ORAL at 19:54

## 2021-01-01 RX ADMIN — ATORVASTATIN CALCIUM 80 MG: 40 TABLET, FILM COATED ORAL at 20:08

## 2021-01-01 RX ADMIN — CHLORHEXIDINE GLUCONATE 15 ML: 1.2 SOLUTION ORAL at 08:08

## 2021-01-01 RX ADMIN — PHENYLEPHRINE HYDROCHLORIDE 3 MCG/KG/MIN: 10 INJECTION INTRAVENOUS at 01:05

## 2021-01-01 RX ADMIN — DOPAMINE HYDROCHLORIDE 14 MCG/KG/MIN: 40 INJECTION, SOLUTION, CONCENTRATE INTRAVENOUS at 21:18

## 2021-01-01 RX ADMIN — DEXTROSE MONOHYDRATE 25 G: 25 INJECTION, SOLUTION INTRAVENOUS at 07:51

## 2021-01-01 RX ADMIN — ATORVASTATIN CALCIUM 80 MG: 40 TABLET, FILM COATED ORAL at 20:03

## 2021-01-01 RX ADMIN — MIDODRINE HYDROCHLORIDE 10 MG: 10 TABLET ORAL at 05:35

## 2021-01-01 RX ADMIN — ANTICOAGULANT CITRATE DEXTROSE SOLUTION FORMULA A 2 ML: 12.25; 11; 3.65 SOLUTION INTRAVENOUS at 16:48

## 2021-01-01 RX ADMIN — Medication 300 MCG/HR: at 17:35

## 2021-01-01 RX ADMIN — CALCIUM CHLORIDE, MAGNESIUM CHLORIDE, SODIUM CHLORIDE, SODIUM BICARBONATE, POTASSIUM CHLORIDE AND SODIUM PHOSPHATE DIBASIC DIHYDRATE 1000 ML/HR: 3.68; 3.05; 6.34; 3.09; .314; .187 INJECTION INTRAVENOUS at 18:51

## 2021-01-01 RX ADMIN — PHENYLEPHRINE HYDROCHLORIDE 3 MCG/KG/MIN: 10 INJECTION INTRAVENOUS at 08:18

## 2021-01-01 RX ADMIN — PROPOFOL 50 MCG/KG/MIN: 10 INJECTION, EMULSION INTRAVENOUS at 22:36

## 2021-01-01 RX ADMIN — ASPIRIN 81 MG CHEWABLE TABLET 81 MG: 81 TABLET CHEWABLE at 08:01

## 2021-01-01 RX ADMIN — Medication 10 MG/HR: at 17:59

## 2021-01-01 RX ADMIN — HYDROCODONE BITARTRATE AND ACETAMINOPHEN 1 TABLET: 5; 325 TABLET ORAL at 00:27

## 2021-01-01 RX ADMIN — FAMOTIDINE 20 MG: 20 TABLET, FILM COATED ORAL at 09:58

## 2021-01-01 RX ADMIN — MIDAZOLAM HYDROCHLORIDE 1 MG: 1 INJECTION, SOLUTION INTRAMUSCULAR; INTRAVENOUS at 03:03

## 2021-01-01 RX ADMIN — PHENYLEPHRINE HYDROCHLORIDE 3 MCG/KG/MIN: 10 INJECTION INTRAVENOUS at 01:57

## 2021-01-01 RX ADMIN — Medication 5 MG/HR: at 13:22

## 2021-01-01 RX ADMIN — CAFFEINE CITRATE 60 MG: 20 INJECTION INTRAVENOUS at 12:39

## 2021-01-01 RX ADMIN — ASPIRIN 81 MG CHEWABLE TABLET 81 MG: 81 TABLET CHEWABLE at 08:02

## 2021-01-01 RX ADMIN — DEXTROSE MONOHYDRATE 25 G: 25 INJECTION, SOLUTION INTRAVENOUS at 11:44

## 2021-01-01 RX ADMIN — ACETAMINOPHEN 650 MG: 325 TABLET, FILM COATED ORAL at 21:04

## 2021-01-01 RX ADMIN — MIDODRINE HYDROCHLORIDE 15 MG: 10 TABLET ORAL at 05:04

## 2021-01-01 RX ADMIN — CALCIUM CHLORIDE, MAGNESIUM CHLORIDE, SODIUM CHLORIDE, SODIUM BICARBONATE, POTASSIUM CHLORIDE AND SODIUM PHOSPHATE DIBASIC DIHYDRATE 1000 ML/HR: 3.68; 3.05; 6.34; 3.09; .314; .187 INJECTION INTRAVENOUS at 15:29

## 2021-01-01 RX ADMIN — MIDODRINE HYDROCHLORIDE 15 MG: 10 TABLET ORAL at 13:59

## 2021-01-01 RX ADMIN — CALCIUM CHLORIDE, MAGNESIUM CHLORIDE, SODIUM CHLORIDE, SODIUM BICARBONATE, POTASSIUM CHLORIDE AND SODIUM PHOSPHATE DIBASIC DIHYDRATE 1000 ML/HR: 3.68; 3.05; 6.34; 3.09; .314; .187 INJECTION INTRAVENOUS at 17:58

## 2021-01-01 RX ADMIN — APIXABAN 2.5 MG: 2.5 TABLET, FILM COATED ORAL at 08:26

## 2021-01-01 RX ADMIN — PROPOFOL 50 MCG/KG/MIN: 10 INJECTION, EMULSION INTRAVENOUS at 15:01

## 2021-01-01 RX ADMIN — PROPOFOL 40 MCG/KG/MIN: 10 INJECTION, EMULSION INTRAVENOUS at 06:29

## 2021-01-01 RX ADMIN — PHENYLEPHRINE HYDROCHLORIDE 3 MCG/KG/MIN: 10 INJECTION INTRAVENOUS at 10:31

## 2021-01-01 RX ADMIN — HYDROCORTISONE SODIUM SUCCINATE 25 MG: 100 INJECTION, POWDER, FOR SOLUTION INTRAMUSCULAR; INTRAVENOUS at 17:46

## 2021-01-01 RX ADMIN — MIDODRINE HYDROCHLORIDE 10 MG: 10 TABLET ORAL at 17:07

## 2021-01-01 RX ADMIN — CALCIUM CHLORIDE, MAGNESIUM CHLORIDE, SODIUM CHLORIDE, SODIUM BICARBONATE, POTASSIUM CHLORIDE AND SODIUM PHOSPHATE DIBASIC DIHYDRATE 1000 ML/HR: 3.68; 3.05; 6.34; 3.09; .314; .187 INJECTION INTRAVENOUS at 18:59

## 2021-01-01 RX ADMIN — VASOPRESSIN 0.03 UNITS/MIN: 20 INJECTION INTRAVENOUS at 03:36

## 2021-01-01 RX ADMIN — CALCIUM CHLORIDE, MAGNESIUM CHLORIDE, SODIUM CHLORIDE, SODIUM BICARBONATE, POTASSIUM CHLORIDE AND SODIUM PHOSPHATE DIBASIC DIHYDRATE 1000 ML/HR: 3.68; 3.05; 6.34; 3.09; .314; .187 INJECTION INTRAVENOUS at 06:54

## 2021-01-01 RX ADMIN — APIXABAN 2.5 MG: 2.5 TABLET, FILM COATED ORAL at 20:02

## 2021-01-01 RX ADMIN — MIDODRINE HYDROCHLORIDE 15 MG: 10 TABLET ORAL at 22:12

## 2021-01-01 RX ADMIN — CHLORHEXIDINE GLUCONATE 15 ML: 1.2 SOLUTION ORAL at 20:06

## 2021-01-01 RX ADMIN — HYDROCODONE BITARTRATE AND ACETAMINOPHEN 1 TABLET: 5; 325 TABLET ORAL at 04:03

## 2021-01-01 RX ADMIN — NOREPINEPHRINE BITARTRATE 0.32 MCG/KG/MIN: 1 INJECTION, SOLUTION, CONCENTRATE INTRAVENOUS at 10:01

## 2021-01-01 RX ADMIN — MIDODRINE HYDROCHLORIDE 15 MG: 10 TABLET ORAL at 14:02

## 2021-01-01 RX ADMIN — ALTEPLASE: 2.2 INJECTION, POWDER, LYOPHILIZED, FOR SOLUTION INTRAVENOUS at 10:34

## 2021-01-01 RX ADMIN — MIDODRINE HYDROCHLORIDE 15 MG: 10 TABLET ORAL at 21:10

## 2021-01-01 RX ADMIN — MIDODRINE HYDROCHLORIDE 15 MG: 10 TABLET ORAL at 12:15

## 2021-01-01 RX ADMIN — ASPIRIN 81 MG: 81 TABLET, CHEWABLE ORAL at 13:40

## 2021-01-01 RX ADMIN — PHENYLEPHRINE HYDROCHLORIDE 3 MCG/KG/MIN: 10 INJECTION INTRAVENOUS at 21:11

## 2021-01-01 RX ADMIN — ASPIRIN 81 MG CHEWABLE TABLET 81 MG: 81 TABLET CHEWABLE at 08:08

## 2021-01-01 RX ADMIN — SEVELAMER CARBONATE 1.6 G: 800 POWDER, FOR SUSPENSION ORAL at 17:38

## 2021-01-01 RX ADMIN — MIDODRINE HYDROCHLORIDE 15 MG: 10 TABLET ORAL at 13:55

## 2021-01-01 RX ADMIN — MIDODRINE HYDROCHLORIDE 15 MG: 10 TABLET ORAL at 06:24

## 2021-01-01 RX ADMIN — APIXABAN 2.5 MG: 2.5 TABLET, FILM COATED ORAL at 12:45

## 2021-01-01 RX ADMIN — NOREPINEPHRINE BITARTRATE 0.32 MCG/KG/MIN: 1 INJECTION, SOLUTION, CONCENTRATE INTRAVENOUS at 16:03

## 2021-01-01 RX ADMIN — FAMOTIDINE 20 MG: 20 TABLET, FILM COATED ORAL at 08:40

## 2021-01-01 RX ADMIN — CHLORHEXIDINE GLUCONATE 15 ML: 1.2 SOLUTION ORAL at 20:10

## 2021-01-01 RX ADMIN — MIDODRINE HYDROCHLORIDE 15 MG: 10 TABLET ORAL at 12:38

## 2021-01-01 RX ADMIN — HYDROCORTISONE SODIUM SUCCINATE 50 MG: 100 INJECTION, POWDER, FOR SOLUTION INTRAMUSCULAR; INTRAVENOUS at 12:00

## 2021-01-01 RX ADMIN — MIDODRINE HYDROCHLORIDE 10 MG: 10 TABLET ORAL at 06:28

## 2021-01-01 RX ADMIN — CHLORHEXIDINE GLUCONATE 15 ML: 1.2 SOLUTION ORAL at 21:37

## 2021-01-01 RX ADMIN — PROPOFOL 50 MCG/KG/MIN: 10 INJECTION, EMULSION INTRAVENOUS at 01:15

## 2021-01-01 RX ADMIN — Medication 300 MCG/HR: at 04:13

## 2021-01-01 RX ADMIN — Medication 300 MCG/HR: at 01:03

## 2021-01-01 RX ADMIN — DOPAMINE HYDROCHLORIDE 14 MCG/KG/MIN: 40 INJECTION, SOLUTION, CONCENTRATE INTRAVENOUS at 12:03

## 2021-01-01 RX ADMIN — CAFFEINE CITRATE 60 MG: 20 INJECTION INTRAVENOUS at 12:45

## 2021-01-01 RX ADMIN — CALCIUM CHLORIDE, MAGNESIUM CHLORIDE, SODIUM CHLORIDE, SODIUM BICARBONATE, POTASSIUM CHLORIDE AND SODIUM PHOSPHATE DIBASIC DIHYDRATE 1000 ML/HR: 3.68; 3.05; 6.34; 3.09; .314; .187 INJECTION INTRAVENOUS at 20:30

## 2021-01-01 RX ADMIN — SODIUM CHLORIDE, PRESERVATIVE FREE 10 ML: 5 INJECTION INTRAVENOUS at 21:01

## 2021-01-01 RX ADMIN — DEXTROSE AND SODIUM CHLORIDE 100 ML/HR: 5; 900 INJECTION, SOLUTION INTRAVENOUS at 21:21

## 2021-01-01 RX ADMIN — MIDODRINE HYDROCHLORIDE 15 MG: 10 TABLET ORAL at 08:29

## 2021-01-01 RX ADMIN — MIDODRINE HYDROCHLORIDE 15 MG: 10 TABLET ORAL at 17:46

## 2021-01-01 RX ADMIN — PHENYLEPHRINE HYDROCHLORIDE 3 MCG/KG/MIN: 10 INJECTION INTRAVENOUS at 13:11

## 2021-01-01 RX ADMIN — SODIUM CHLORIDE, PRESERVATIVE FREE 10 ML: 5 INJECTION INTRAVENOUS at 08:12

## 2021-01-01 RX ADMIN — OXYMETAZOLINE HYDROCHLORIDE 2 SPRAY: 5 SPRAY NASAL at 20:24

## 2021-01-01 RX ADMIN — VASOPRESSIN 0.03 UNITS/MIN: 20 INJECTION INTRAVENOUS at 11:31

## 2021-01-01 RX ADMIN — FAMOTIDINE 20 MG: 20 TABLET, FILM COATED ORAL at 08:11

## 2021-01-01 RX ADMIN — PROPOFOL 50 MCG/KG/MIN: 10 INJECTION, EMULSION INTRAVENOUS at 13:17

## 2021-01-01 RX ADMIN — APIXABAN 2.5 MG: 2.5 TABLET, FILM COATED ORAL at 09:03

## 2021-01-01 RX ADMIN — PROPOFOL 50 MCG/KG/MIN: 10 INJECTION, EMULSION INTRAVENOUS at 02:54

## 2021-01-01 RX ADMIN — PHENYLEPHRINE HYDROCHLORIDE 3 MCG/KG/MIN: 10 INJECTION INTRAVENOUS at 19:18

## 2021-01-01 RX ADMIN — NOREPINEPHRINE BITARTRATE 0.32 MCG/KG/MIN: 1 INJECTION, SOLUTION, CONCENTRATE INTRAVENOUS at 18:24

## 2021-01-01 RX ADMIN — PHENYLEPHRINE HYDROCHLORIDE 1.4 MCG/KG/MIN: 10 INJECTION INTRAVENOUS at 08:52

## 2021-01-01 RX ADMIN — PROPOFOL 50 MCG/KG/MIN: 10 INJECTION, EMULSION INTRAVENOUS at 11:53

## 2021-01-01 RX ADMIN — ASPIRIN 81 MG CHEWABLE TABLET 81 MG: 81 TABLET CHEWABLE at 09:58

## 2021-01-01 RX ADMIN — ATORVASTATIN CALCIUM 80 MG: 40 TABLET, FILM COATED ORAL at 21:39

## 2021-01-01 RX ADMIN — PHENYLEPHRINE HYDROCHLORIDE 3 MCG/KG/MIN: 10 INJECTION INTRAVENOUS at 23:44

## 2021-01-01 RX ADMIN — MIDODRINE HYDROCHLORIDE 5 MG: 5 TABLET ORAL at 16:36

## 2021-01-01 RX ADMIN — DEXTROSE AND SODIUM CHLORIDE 50 ML/HR: 5; 900 INJECTION, SOLUTION INTRAVENOUS at 02:32

## 2021-01-01 RX ADMIN — APIXABAN 2.5 MG: 2.5 TABLET, FILM COATED ORAL at 21:00

## 2021-01-01 RX ADMIN — Medication 10 MG/HR: at 08:00

## 2021-01-01 RX ADMIN — Medication 300 MCG/HR: at 20:19

## 2021-01-01 RX ADMIN — CALCIUM CHLORIDE, MAGNESIUM CHLORIDE, SODIUM CHLORIDE, SODIUM BICARBONATE, POTASSIUM CHLORIDE AND SODIUM PHOSPHATE DIBASIC DIHYDRATE 1000 ML/HR: 3.68; 3.05; 6.34; 3.09; .314; .187 INJECTION INTRAVENOUS at 16:39

## 2021-01-01 RX ADMIN — SODIUM CHLORIDE, PRESERVATIVE FREE 10 ML: 5 INJECTION INTRAVENOUS at 19:55

## 2021-01-01 RX ADMIN — CALCIUM CHLORIDE, MAGNESIUM CHLORIDE, SODIUM CHLORIDE, SODIUM BICARBONATE, POTASSIUM CHLORIDE AND SODIUM PHOSPHATE DIBASIC DIHYDRATE 1000 ML/HR: 3.68; 3.05; 6.34; 3.09; .314; .187 INJECTION INTRAVENOUS at 09:09

## 2021-01-01 RX ADMIN — Medication 300 MCG/HR: at 07:53

## 2021-01-01 RX ADMIN — DEXTROSE MONOHYDRATE 25 G: 25 INJECTION, SOLUTION INTRAVENOUS at 17:12

## 2021-01-01 RX ADMIN — Medication 300 MCG/HR: at 13:11

## 2021-01-01 RX ADMIN — SODIUM CHLORIDE, PRESERVATIVE FREE 10 ML: 5 INJECTION INTRAVENOUS at 08:29

## 2021-01-01 RX ADMIN — SEVELAMER CARBONATE 1.6 G: 800 POWDER, FOR SUSPENSION ORAL at 12:02

## 2021-01-01 RX ADMIN — PROPOFOL 50 MCG/KG/MIN: 10 INJECTION, EMULSION INTRAVENOUS at 20:24

## 2021-01-01 RX ADMIN — TAZOBACTAM SODIUM AND PIPERACILLIN SODIUM 3.38 G: 375; 3 INJECTION, SOLUTION INTRAVENOUS at 13:11

## 2021-01-01 RX ADMIN — SODIUM ZIRCONIUM CYCLOSILICATE 10 G: 10 POWDER, FOR SUSPENSION ORAL at 18:54

## 2021-01-01 RX ADMIN — PROPOFOL 50 MCG/KG/MIN: 10 INJECTION, EMULSION INTRAVENOUS at 14:00

## 2021-01-01 RX ADMIN — PROPOFOL 50 MCG/KG/MIN: 10 INJECTION, EMULSION INTRAVENOUS at 01:33

## 2021-01-01 RX ADMIN — HYDROCODONE BITARTRATE AND ACETAMINOPHEN 1 TABLET: 5; 325 TABLET ORAL at 13:50

## 2021-01-01 RX ADMIN — SODIUM CHLORIDE 500 ML: 9 INJECTION, SOLUTION INTRAVENOUS at 09:36

## 2021-01-01 RX ADMIN — APIXABAN 2.5 MG: 2.5 TABLET, FILM COATED ORAL at 19:29

## 2021-01-01 RX ADMIN — CALCIUM CHLORIDE, MAGNESIUM CHLORIDE, SODIUM CHLORIDE, SODIUM BICARBONATE, POTASSIUM CHLORIDE AND SODIUM PHOSPHATE DIBASIC DIHYDRATE 1000 ML/HR: 3.68; 3.05; 6.34; 3.09; .314; .187 INJECTION INTRAVENOUS at 17:25

## 2021-01-01 RX ADMIN — PROPOFOL 50 MCG/KG/MIN: 10 INJECTION, EMULSION INTRAVENOUS at 01:09

## 2021-01-01 RX ADMIN — Medication 300 MCG/HR: at 02:11

## 2021-01-01 RX ADMIN — MIDODRINE HYDROCHLORIDE 15 MG: 10 TABLET ORAL at 13:40

## 2021-01-01 RX ADMIN — PROPOFOL 50 MCG/KG/MIN: 10 INJECTION, EMULSION INTRAVENOUS at 20:59

## 2021-01-01 RX ADMIN — TAZOBACTAM SODIUM AND PIPERACILLIN SODIUM 3.38 G: 375; 3 INJECTION, SOLUTION INTRAVENOUS at 22:06

## 2021-01-01 RX ADMIN — TAZOBACTAM SODIUM AND PIPERACILLIN SODIUM 3.38 G: 375; 3 INJECTION, SOLUTION INTRAVENOUS at 13:00

## 2021-01-01 RX ADMIN — CALCIUM CHLORIDE, MAGNESIUM CHLORIDE, SODIUM CHLORIDE, SODIUM BICARBONATE, POTASSIUM CHLORIDE AND SODIUM PHOSPHATE DIBASIC DIHYDRATE 1000 ML/HR: 3.68; 3.05; 6.34; 3.09; .314; .187 INJECTION INTRAVENOUS at 12:45

## 2021-01-01 RX ADMIN — HYDROCODONE BITARTRATE AND ACETAMINOPHEN 1 TABLET: 5; 325 TABLET ORAL at 18:09

## 2021-01-01 RX ADMIN — Medication 3 MG/HR: at 18:42

## 2021-01-01 RX ADMIN — OXYMETAZOLINE HYDROCHLORIDE 2 SPRAY: 5 SPRAY NASAL at 11:16

## 2021-01-01 RX ADMIN — SODIUM CHLORIDE, PRESERVATIVE FREE 10 ML: 5 INJECTION INTRAVENOUS at 21:38

## 2021-01-01 RX ADMIN — NOREPINEPHRINE BITARTRATE 0.32 MCG/KG/MIN: 1 INJECTION, SOLUTION, CONCENTRATE INTRAVENOUS at 23:44

## 2021-01-01 RX ADMIN — HYDROCODONE BITARTRATE AND ACETAMINOPHEN 1 TABLET: 5; 325 TABLET ORAL at 19:36

## 2021-01-01 RX ADMIN — CALCIUM CHLORIDE, MAGNESIUM CHLORIDE, SODIUM CHLORIDE, SODIUM BICARBONATE, POTASSIUM CHLORIDE AND SODIUM PHOSPHATE DIBASIC DIHYDRATE 1000 ML/HR: 3.68; 3.05; 6.34; 3.09; .314; .187 INJECTION INTRAVENOUS at 02:47

## 2021-01-01 RX ADMIN — PROPOFOL 50 MCG/KG/MIN: 10 INJECTION, EMULSION INTRAVENOUS at 11:08

## 2021-01-01 RX ADMIN — HYDROCODONE BITARTRATE AND ACETAMINOPHEN 1 TABLET: 5; 325 TABLET ORAL at 06:49

## 2021-01-01 RX ADMIN — Medication 300 MCG/HR: at 17:50

## 2021-01-01 RX ADMIN — DOPAMINE HYDROCHLORIDE 10 MCG/KG/MIN: 160 INJECTION, SOLUTION INTRAVENOUS at 17:37

## 2021-01-01 RX ADMIN — CALCIUM CHLORIDE, MAGNESIUM CHLORIDE, SODIUM CHLORIDE, SODIUM BICARBONATE, POTASSIUM CHLORIDE AND SODIUM PHOSPHATE DIBASIC DIHYDRATE 1000 ML/HR: 3.68; 3.05; 6.34; 3.09; .314; .187 INJECTION INTRAVENOUS at 17:57

## 2021-01-01 RX ADMIN — CHLORHEXIDINE GLUCONATE 15 ML: 1.2 SOLUTION ORAL at 20:03

## 2021-01-01 RX ADMIN — DEXTROSE AND SODIUM CHLORIDE 100 ML/HR: 5; 900 INJECTION, SOLUTION INTRAVENOUS at 16:18

## 2021-01-01 RX ADMIN — SODIUM CHLORIDE 1 G: 900 INJECTION INTRAVENOUS at 17:47

## 2021-01-01 RX ADMIN — APIXABAN 2.5 MG: 2.5 TABLET, FILM COATED ORAL at 08:29

## 2021-01-01 RX ADMIN — Medication 300 MCG/HR: at 22:49

## 2021-01-01 RX ADMIN — OXYMETAZOLINE HYDROCHLORIDE 2 SPRAY: 5 SPRAY NASAL at 20:16

## 2021-01-01 RX ADMIN — NOREPINEPHRINE BITARTRATE 0.32 MCG/KG/MIN: 1 INJECTION, SOLUTION, CONCENTRATE INTRAVENOUS at 10:45

## 2021-01-01 RX ADMIN — SODIUM CHLORIDE 250 ML: 9 INJECTION, SOLUTION INTRAVENOUS at 20:33

## 2021-01-01 RX ADMIN — PHENYLEPHRINE HYDROCHLORIDE 3 MCG/KG/MIN: 10 INJECTION INTRAVENOUS at 14:26

## 2021-01-01 RX ADMIN — SEVELAMER CARBONATE 1.6 G: 800 POWDER, FOR SUSPENSION ORAL at 11:02

## 2021-01-01 RX ADMIN — HEPARIN SODIUM 13 UNITS/KG/HR: 10000 INJECTION, SOLUTION INTRAVENOUS at 02:27

## 2021-01-01 RX ADMIN — TAZOBACTAM SODIUM AND PIPERACILLIN SODIUM 3.38 G: 375; 3 INJECTION, SOLUTION INTRAVENOUS at 06:58

## 2021-01-01 RX ADMIN — MIDAZOLAM HYDROCHLORIDE 2 MG: 1 INJECTION INTRAMUSCULAR; INTRAVENOUS at 02:31

## 2021-01-01 RX ADMIN — SODIUM CHLORIDE, PRESERVATIVE FREE 10 ML: 5 INJECTION INTRAVENOUS at 20:04

## 2021-01-01 RX ADMIN — ASPIRIN 81 MG: 81 TABLET, CHEWABLE ORAL at 12:39

## 2021-01-01 RX ADMIN — DEXTROSE AND SODIUM CHLORIDE 100 ML/HR: 5; 900 INJECTION, SOLUTION INTRAVENOUS at 02:13

## 2021-01-01 RX ADMIN — Medication 300 MCG/HR: at 17:06

## 2021-01-01 RX ADMIN — CALCIUM CHLORIDE, MAGNESIUM CHLORIDE, SODIUM CHLORIDE, SODIUM BICARBONATE, POTASSIUM CHLORIDE AND SODIUM PHOSPHATE DIBASIC DIHYDRATE 1000 ML/HR: 3.68; 3.05; 6.34; 3.09; .314; .187 INJECTION INTRAVENOUS at 13:59

## 2021-01-01 RX ADMIN — CALCIUM CHLORIDE, MAGNESIUM CHLORIDE, SODIUM CHLORIDE, SODIUM BICARBONATE, POTASSIUM CHLORIDE AND SODIUM PHOSPHATE DIBASIC DIHYDRATE 1000 ML/HR: 3.68; 3.05; 6.34; 3.09; .314; .187 INJECTION INTRAVENOUS at 09:49

## 2021-01-01 RX ADMIN — SODIUM CHLORIDE, PRESERVATIVE FREE 10 ML: 5 INJECTION INTRAVENOUS at 08:16

## 2021-01-01 RX ADMIN — DOPAMINE HYDROCHLORIDE 2 MCG/KG/MIN: 160 INJECTION, SOLUTION INTRAVENOUS at 06:55

## 2021-01-01 RX ADMIN — HYDROCODONE BITARTRATE AND ACETAMINOPHEN 1 TABLET: 5; 325 TABLET ORAL at 18:58

## 2021-01-01 RX ADMIN — MIDODRINE HYDROCHLORIDE 15 MG: 10 TABLET ORAL at 06:21

## 2021-01-01 RX ADMIN — SODIUM CHLORIDE, PRESERVATIVE FREE 10 ML: 5 INJECTION INTRAVENOUS at 19:30

## 2021-01-01 RX ADMIN — CALCIUM CHLORIDE, MAGNESIUM CHLORIDE, SODIUM CHLORIDE, SODIUM BICARBONATE, POTASSIUM CHLORIDE AND SODIUM PHOSPHATE DIBASIC DIHYDRATE 1000 ML/HR: 3.68; 3.05; 6.34; 3.09; .314; .187 INJECTION INTRAVENOUS at 03:50

## 2021-01-01 RX ADMIN — CALCIUM CHLORIDE, MAGNESIUM CHLORIDE, SODIUM CHLORIDE, SODIUM BICARBONATE, POTASSIUM CHLORIDE AND SODIUM PHOSPHATE DIBASIC DIHYDRATE 1000 ML/HR: 3.68; 3.05; 6.34; 3.09; .314; .187 INJECTION INTRAVENOUS at 08:58

## 2021-01-01 RX ADMIN — VASOPRESSIN 0.03 UNITS/MIN: 20 INJECTION INTRAVENOUS at 17:33

## 2021-01-01 RX ADMIN — TAZOBACTAM SODIUM AND PIPERACILLIN SODIUM 3.38 G: 375; 3 INJECTION, SOLUTION INTRAVENOUS at 20:09

## 2021-01-01 RX ADMIN — TAZOBACTAM SODIUM AND PIPERACILLIN SODIUM 3.38 G: 375; 3 INJECTION, SOLUTION INTRAVENOUS at 05:49

## 2021-01-01 RX ADMIN — CALCIUM CHLORIDE, MAGNESIUM CHLORIDE, SODIUM CHLORIDE, SODIUM BICARBONATE, POTASSIUM CHLORIDE AND SODIUM PHOSPHATE DIBASIC DIHYDRATE 1000 ML/HR: 3.68; 3.05; 6.34; 3.09; .314; .187 INJECTION INTRAVENOUS at 07:54

## 2021-01-01 RX ADMIN — SEVELAMER CARBONATE 1.6 G: 800 POWDER, FOR SUSPENSION ORAL at 11:57

## 2021-01-01 RX ADMIN — PROPOFOL 50 MCG/KG/MIN: 10 INJECTION, EMULSION INTRAVENOUS at 10:51

## 2021-01-01 RX ADMIN — SEVELAMER CARBONATE 1.6 G: 800 POWDER, FOR SUSPENSION ORAL at 11:53

## 2021-01-01 RX ADMIN — CALCIUM CHLORIDE, MAGNESIUM CHLORIDE, SODIUM CHLORIDE, SODIUM BICARBONATE, POTASSIUM CHLORIDE AND SODIUM PHOSPHATE DIBASIC DIHYDRATE 1000 ML/HR: 3.68; 3.05; 6.34; 3.09; .314; .187 INJECTION INTRAVENOUS at 20:29

## 2021-01-01 RX ADMIN — Medication 300 MCG/HR: at 00:12

## 2021-01-01 RX ADMIN — PROPOFOL 15 MCG/KG/MIN: 10 INJECTION, EMULSION INTRAVENOUS at 02:50

## 2021-01-01 RX ADMIN — FAMOTIDINE 20 MG: 20 TABLET, FILM COATED ORAL at 08:56

## 2021-01-01 RX ADMIN — SODIUM CHLORIDE, PRESERVATIVE FREE 10 ML: 5 INJECTION INTRAVENOUS at 09:37

## 2021-01-01 RX ADMIN — Medication 6 MG/HR: at 17:36

## 2021-01-01 RX ADMIN — MIDODRINE HYDROCHLORIDE 15 MG: 10 TABLET ORAL at 14:00

## 2021-01-01 RX ADMIN — SODIUM CHLORIDE, PRESERVATIVE FREE 10 ML: 5 INJECTION INTRAVENOUS at 21:06

## 2021-01-01 RX ADMIN — VANCOMYCIN HYDROCHLORIDE 750 MG: 750 INJECTION, SOLUTION INTRAVENOUS at 23:29

## 2021-01-01 RX ADMIN — PROPOFOL 50 MCG/KG/MIN: 10 INJECTION, EMULSION INTRAVENOUS at 23:12

## 2021-01-01 RX ADMIN — Medication 300 MCG/HR: at 10:33

## 2021-01-01 RX ADMIN — SEVELAMER CARBONATE 1.6 G: 800 POWDER, FOR SUSPENSION ORAL at 08:06

## 2021-01-01 RX ADMIN — ALBUMIN HUMAN 12.5 G: 0.25 SOLUTION INTRAVENOUS at 08:06

## 2021-01-01 RX ADMIN — SODIUM CHLORIDE, PRESERVATIVE FREE 10 ML: 5 INJECTION INTRAVENOUS at 21:56

## 2021-01-01 RX ADMIN — SODIUM CHLORIDE, PRESERVATIVE FREE 10 ML: 5 INJECTION INTRAVENOUS at 08:27

## 2021-01-01 RX ADMIN — Medication 5 MG/HR: at 06:10

## 2021-01-01 RX ADMIN — NOREPINEPHRINE BITARTRATE 0.3 MCG/KG/MIN: 1 INJECTION, SOLUTION, CONCENTRATE INTRAVENOUS at 02:25

## 2021-01-01 RX ADMIN — Medication 300 MCG/HR: at 13:28

## 2021-01-01 RX ADMIN — APIXABAN 2.5 MG: 2.5 TABLET, FILM COATED ORAL at 20:09

## 2021-01-01 RX ADMIN — HYDROCODONE BITARTRATE AND ACETAMINOPHEN 1 TABLET: 5; 325 TABLET ORAL at 08:45

## 2021-01-01 RX ADMIN — PROPOFOL 50 MCG/KG/MIN: 10 INJECTION, EMULSION INTRAVENOUS at 01:04

## 2021-01-01 RX ADMIN — SEVELAMER CARBONATE 800 MG: 800 TABLET, FILM COATED ORAL at 16:58

## 2021-01-01 RX ADMIN — CALCIUM CHLORIDE, MAGNESIUM CHLORIDE, SODIUM CHLORIDE, SODIUM BICARBONATE, POTASSIUM CHLORIDE AND SODIUM PHOSPHATE DIBASIC DIHYDRATE 1000 ML/HR: 3.68; 3.05; 6.34; 3.09; .314; .187 INJECTION INTRAVENOUS at 17:26

## 2021-01-01 RX ADMIN — CALCIUM CHLORIDE, MAGNESIUM CHLORIDE, SODIUM CHLORIDE, SODIUM BICARBONATE, POTASSIUM CHLORIDE AND SODIUM PHOSPHATE DIBASIC DIHYDRATE 1000 ML/HR: 3.68; 3.05; 6.34; 3.09; .314; .187 INJECTION INTRAVENOUS at 03:56

## 2021-01-01 RX ADMIN — Medication 3 MG/HR: at 07:52

## 2021-01-01 RX ADMIN — DOPAMINE HYDROCHLORIDE 14 MCG/KG/MIN: 40 INJECTION, SOLUTION, CONCENTRATE INTRAVENOUS at 09:32

## 2021-01-01 RX ADMIN — ATORVASTATIN CALCIUM 80 MG: 40 TABLET, FILM COATED ORAL at 19:54

## 2021-01-01 RX ADMIN — SEVELAMER CARBONATE 800 MG: 800 TABLET, FILM COATED ORAL at 08:11

## 2021-01-01 RX ADMIN — DEXTROSE AND SODIUM CHLORIDE 100 ML/HR: 5; 900 INJECTION, SOLUTION INTRAVENOUS at 01:58

## 2021-01-01 RX ADMIN — HEPARIN SODIUM 13 UNITS/KG/HR: 10000 INJECTION, SOLUTION INTRAVENOUS at 16:00

## 2021-01-01 RX ADMIN — FAMOTIDINE 20 MG: 20 TABLET, FILM COATED ORAL at 08:07

## 2021-01-01 RX ADMIN — SEVELAMER CARBONATE 1.6 G: 800 POWDER, FOR SUSPENSION ORAL at 08:08

## 2021-01-01 RX ADMIN — HYDROCODONE BITARTRATE AND ACETAMINOPHEN 1 TABLET: 5; 325 TABLET ORAL at 09:36

## 2021-01-01 RX ADMIN — PROPOFOL 50 MCG/KG/MIN: 10 INJECTION, EMULSION INTRAVENOUS at 01:17

## 2021-01-01 RX ADMIN — HYDROCODONE BITARTRATE AND ACETAMINOPHEN 1 TABLET: 5; 325 TABLET ORAL at 05:37

## 2021-01-01 RX ADMIN — PROPOFOL 50 MCG/KG/MIN: 10 INJECTION, EMULSION INTRAVENOUS at 18:41

## 2021-01-01 RX ADMIN — PROPOFOL 50 MCG/KG/MIN: 10 INJECTION, EMULSION INTRAVENOUS at 16:27

## 2021-01-01 RX ADMIN — HEPARIN SODIUM 15 UNITS/KG/HR: 10000 INJECTION, SOLUTION INTRAVENOUS at 01:19

## 2021-01-01 RX ADMIN — ASPIRIN 81 MG CHEWABLE TABLET 81 MG: 81 TABLET CHEWABLE at 08:18

## 2021-01-01 RX ADMIN — SODIUM CHLORIDE, PRESERVATIVE FREE 10 ML: 5 INJECTION INTRAVENOUS at 08:10

## 2021-01-01 RX ADMIN — ATORVASTATIN CALCIUM 80 MG: 40 TABLET, FILM COATED ORAL at 21:37

## 2021-01-01 RX ADMIN — DEXTROSE AND SODIUM CHLORIDE 50 ML/HR: 5; 900 INJECTION, SOLUTION INTRAVENOUS at 06:37

## 2021-01-01 RX ADMIN — MIDODRINE HYDROCHLORIDE 15 MG: 10 TABLET ORAL at 21:00

## 2021-01-01 RX ADMIN — ALBUMIN HUMAN 12.5 G: 0.25 SOLUTION INTRAVENOUS at 14:59

## 2021-01-01 RX ADMIN — ATORVASTATIN CALCIUM 80 MG: 40 TABLET, FILM COATED ORAL at 20:11

## 2021-01-01 RX ADMIN — APIXABAN 2.5 MG: 2.5 TABLET, FILM COATED ORAL at 08:40

## 2021-01-01 RX ADMIN — ASPIRIN 81 MG CHEWABLE TABLET 81 MG: 81 TABLET CHEWABLE at 09:57

## 2021-01-01 RX ADMIN — HYDROCORTISONE SODIUM SUCCINATE 50 MG: 100 INJECTION, POWDER, FOR SOLUTION INTRAMUSCULAR; INTRAVENOUS at 06:28

## 2021-01-01 RX ADMIN — VASOPRESSIN 0.03 UNITS/MIN: 20 INJECTION INTRAVENOUS at 01:48

## 2021-01-01 RX ADMIN — TAZOBACTAM SODIUM AND PIPERACILLIN SODIUM 3.38 G: 375; 3 INJECTION, SOLUTION INTRAVENOUS at 22:09

## 2021-01-01 RX ADMIN — NOREPINEPHRINE BITARTRATE 0.5 MCG/KG/MIN: 1 INJECTION, SOLUTION, CONCENTRATE INTRAVENOUS at 21:12

## 2021-01-01 RX ADMIN — ANTACID TABLETS 1 TABLET: 500 TABLET, CHEWABLE ORAL at 19:54

## 2021-01-01 RX ADMIN — ASPIRIN 81 MG: 81 TABLET, CHEWABLE ORAL at 09:52

## 2021-01-01 RX ADMIN — PROPOFOL 50 MCG/KG/MIN: 10 INJECTION, EMULSION INTRAVENOUS at 14:35

## 2021-01-01 RX ADMIN — ANTACID TABLETS 1 TABLET: 500 TABLET, CHEWABLE ORAL at 04:03

## 2021-01-01 RX ADMIN — Medication 300 MCG/HR: at 04:59

## 2021-01-01 RX ADMIN — Medication 10 MG/HR: at 00:22

## 2021-01-01 RX ADMIN — PROPOFOL 20 MCG/KG/MIN: 10 INJECTION, EMULSION INTRAVENOUS at 06:55

## 2021-01-01 RX ADMIN — MIDODRINE HYDROCHLORIDE 15 MG: 10 TABLET ORAL at 05:43

## 2021-01-01 RX ADMIN — APIXABAN 2.5 MG: 2.5 TABLET, FILM COATED ORAL at 20:53

## 2021-01-01 RX ADMIN — CHLORHEXIDINE GLUCONATE 15 ML: 1.2 SOLUTION ORAL at 08:11

## 2021-01-01 RX ADMIN — PROPOFOL 50 MCG/KG/MIN: 10 INJECTION, EMULSION INTRAVENOUS at 02:50

## 2021-01-01 RX ADMIN — PROPOFOL 50 MCG/KG/MIN: 10 INJECTION, EMULSION INTRAVENOUS at 08:52

## 2021-01-01 RX ADMIN — DEXTROSE AND SODIUM CHLORIDE 50 ML/HR: 5; 900 INJECTION, SOLUTION INTRAVENOUS at 21:48

## 2021-01-01 RX ADMIN — TAZOBACTAM SODIUM AND PIPERACILLIN SODIUM 3.38 G: 375; 3 INJECTION, SOLUTION INTRAVENOUS at 21:39

## 2021-01-01 RX ADMIN — MIDODRINE HYDROCHLORIDE 15 MG: 10 TABLET ORAL at 21:48

## 2021-01-01 RX ADMIN — SEVELAMER CARBONATE 1.6 G: 800 POWDER, FOR SUSPENSION ORAL at 12:05

## 2021-01-01 RX ADMIN — VANCOMYCIN HYDROCHLORIDE 1000 MG: 1 INJECTION, SOLUTION INTRAVENOUS at 20:09

## 2021-01-01 RX ADMIN — NOREPINEPHRINE BITARTRATE 0.32 MCG/KG/MIN: 1 INJECTION, SOLUTION, CONCENTRATE INTRAVENOUS at 01:04

## 2021-01-01 RX ADMIN — MIDODRINE HYDROCHLORIDE 15 MG: 10 TABLET ORAL at 18:56

## 2021-01-01 RX ADMIN — PROPOFOL 50 MCG/KG/MIN: 10 INJECTION, EMULSION INTRAVENOUS at 23:15

## 2021-01-01 RX ADMIN — Medication 300 MCG/HR: at 06:21

## 2021-01-01 RX ADMIN — SEVELAMER CARBONATE 1.6 G: 800 POWDER, FOR SUSPENSION ORAL at 08:02

## 2021-01-01 RX ADMIN — MIDODRINE HYDROCHLORIDE 15 MG: 10 TABLET ORAL at 08:12

## 2021-01-01 RX ADMIN — NOREPINEPHRINE BITARTRATE 0.3 MCG/KG/MIN: 1 INJECTION, SOLUTION, CONCENTRATE INTRAVENOUS at 20:48

## 2021-01-01 RX ADMIN — MIDODRINE HYDROCHLORIDE 10 MG: 10 TABLET ORAL at 13:20

## 2021-01-01 RX ADMIN — SEVELAMER CARBONATE 1.6 G: 800 POWDER, FOR SUSPENSION ORAL at 12:08

## 2021-01-01 RX ADMIN — PHENYLEPHRINE HYDROCHLORIDE 3 MCG/KG/MIN: 10 INJECTION INTRAVENOUS at 02:15

## 2021-01-01 RX ADMIN — CALCIUM CHLORIDE, MAGNESIUM CHLORIDE, SODIUM CHLORIDE, SODIUM BICARBONATE, POTASSIUM CHLORIDE AND SODIUM PHOSPHATE DIBASIC DIHYDRATE 1000 ML/HR: 3.68; 3.05; 6.34; 3.09; .314; .187 INJECTION INTRAVENOUS at 16:55

## 2021-01-01 RX ADMIN — SODIUM CHLORIDE, PRESERVATIVE FREE 10 ML: 5 INJECTION INTRAVENOUS at 12:51

## 2021-01-01 RX ADMIN — SEVELAMER CARBONATE 1.6 G: 800 POWDER, FOR SUSPENSION ORAL at 17:10

## 2021-01-01 RX ADMIN — CALCIUM CHLORIDE, MAGNESIUM CHLORIDE, SODIUM CHLORIDE, SODIUM BICARBONATE, POTASSIUM CHLORIDE AND SODIUM PHOSPHATE DIBASIC DIHYDRATE 1000 ML/HR: 3.68; 3.05; 6.34; 3.09; .314; .187 INJECTION INTRAVENOUS at 00:22

## 2021-01-01 RX ADMIN — HYDROCORTISONE SODIUM SUCCINATE 50 MG: 100 INJECTION, POWDER, FOR SOLUTION INTRAMUSCULAR; INTRAVENOUS at 18:02

## 2021-01-01 RX ADMIN — SODIUM CHLORIDE, PRESERVATIVE FREE 10 ML: 5 INJECTION INTRAVENOUS at 20:02

## 2021-01-01 RX ADMIN — ONDANSETRON 4 MG: 2 INJECTION INTRAMUSCULAR; INTRAVENOUS at 14:24

## 2021-01-01 RX ADMIN — ASPIRIN 81 MG CHEWABLE TABLET 81 MG: 81 TABLET CHEWABLE at 08:09

## 2021-01-01 RX ADMIN — HYDROCODONE BITARTRATE AND ACETAMINOPHEN 1 TABLET: 5; 325 TABLET ORAL at 00:57

## 2021-01-01 RX ADMIN — CAFFEINE CITRATE 60 MG: 20 INJECTION INTRAVENOUS at 17:54

## 2021-01-01 RX ADMIN — MIDODRINE HYDROCHLORIDE 15 MG: 10 TABLET ORAL at 05:16

## 2021-01-01 RX ADMIN — ASPIRIN 81 MG: 81 TABLET, CHEWABLE ORAL at 08:40

## 2021-01-01 RX ADMIN — PANTOPRAZOLE SODIUM 40 MG: 40 TABLET, DELAYED RELEASE ORAL at 05:32

## 2021-01-01 RX ADMIN — ALTEPLASE: 2.2 INJECTION, POWDER, LYOPHILIZED, FOR SOLUTION INTRAVENOUS at 16:25

## 2021-01-01 RX ADMIN — DOPAMINE HYDROCHLORIDE 14 MCG/KG/MIN: 40 INJECTION, SOLUTION, CONCENTRATE INTRAVENOUS at 16:36

## 2021-01-01 RX ADMIN — MIDODRINE HYDROCHLORIDE 15 MG: 10 TABLET ORAL at 14:23

## 2021-01-01 RX ADMIN — HYDROCORTISONE SODIUM SUCCINATE 25 MG: 100 INJECTION, POWDER, FOR SOLUTION INTRAMUSCULAR; INTRAVENOUS at 05:32

## 2021-01-01 RX ADMIN — MIDODRINE HYDROCHLORIDE 5 MG: 5 TABLET ORAL at 08:39

## 2021-01-01 RX ADMIN — HYDROCODONE BITARTRATE AND ACETAMINOPHEN 1 TABLET: 5; 325 TABLET ORAL at 09:52

## 2021-01-01 RX ADMIN — PANTOPRAZOLE SODIUM 40 MG: 40 TABLET, DELAYED RELEASE ORAL at 06:34

## 2021-01-01 RX ADMIN — PROPOFOL 50 MCG/KG/MIN: 10 INJECTION, EMULSION INTRAVENOUS at 05:57

## 2021-01-01 RX ADMIN — PROPOFOL 50 MCG/KG/MIN: 10 INJECTION, EMULSION INTRAVENOUS at 15:47

## 2021-01-01 RX ADMIN — ASPIRIN 81 MG: 81 TABLET, CHEWABLE ORAL at 09:03

## 2021-01-01 RX ADMIN — DEXTROSE MONOHYDRATE 25 G: 25 INJECTION, SOLUTION INTRAVENOUS at 18:01

## 2021-01-01 RX ADMIN — DOPAMINE HYDROCHLORIDE 14 MCG/KG/MIN: 40 INJECTION, SOLUTION, CONCENTRATE INTRAVENOUS at 01:58

## 2021-01-01 RX ADMIN — SODIUM CHLORIDE, PRESERVATIVE FREE 10 ML: 5 INJECTION INTRAVENOUS at 08:08

## 2021-01-01 RX ADMIN — SODIUM CHLORIDE, PRESERVATIVE FREE 10 ML: 5 INJECTION INTRAVENOUS at 09:53

## 2021-01-01 RX ADMIN — SEVELAMER CARBONATE 1.6 G: 800 POWDER, FOR SUSPENSION ORAL at 18:06

## 2021-01-01 RX ADMIN — PROPOFOL 50 MCG/KG/MIN: 10 INJECTION, EMULSION INTRAVENOUS at 13:23

## 2021-01-01 RX ADMIN — CALCIUM CHLORIDE, MAGNESIUM CHLORIDE, SODIUM CHLORIDE, SODIUM BICARBONATE, POTASSIUM CHLORIDE AND SODIUM PHOSPHATE DIBASIC DIHYDRATE 1000 ML/HR: 3.68; 3.05; 6.34; 3.09; .314; .187 INJECTION INTRAVENOUS at 18:25

## 2021-01-01 RX ADMIN — MIDODRINE HYDROCHLORIDE 5 MG: 5 TABLET ORAL at 11:02

## 2021-01-01 RX ADMIN — ACETAMINOPHEN 650 MG: 325 TABLET, FILM COATED ORAL at 09:38

## 2021-01-01 RX ADMIN — MIDODRINE HYDROCHLORIDE 10 MG: 10 TABLET ORAL at 17:47

## 2021-01-01 RX ADMIN — NOREPINEPHRINE BITARTRATE 0.28 MCG/KG/MIN: 1 INJECTION, SOLUTION, CONCENTRATE INTRAVENOUS at 05:07

## 2021-01-01 RX ADMIN — PHENYLEPHRINE HYDROCHLORIDE 3 MCG/KG/MIN: 10 INJECTION INTRAVENOUS at 04:59

## 2021-01-01 RX ADMIN — Medication 10 MG/HR: at 14:23

## 2021-01-01 RX ADMIN — DOPAMINE HYDROCHLORIDE 2 MCG/KG/MIN: 160 INJECTION, SOLUTION INTRAVENOUS at 20:48

## 2021-01-01 RX ADMIN — ASPIRIN 81 MG CHEWABLE TABLET 81 MG: 81 TABLET CHEWABLE at 08:07

## 2021-01-01 RX ADMIN — CALCIUM CHLORIDE, MAGNESIUM CHLORIDE, SODIUM CHLORIDE, SODIUM BICARBONATE, POTASSIUM CHLORIDE AND SODIUM PHOSPHATE DIBASIC DIHYDRATE 1000 ML/HR: 3.68; 3.05; 6.34; 3.09; .314; .187 INJECTION INTRAVENOUS at 06:25

## 2021-01-01 RX ADMIN — ATORVASTATIN CALCIUM 80 MG: 40 TABLET, FILM COATED ORAL at 20:09

## 2021-01-01 RX ADMIN — ALBUMIN HUMAN 12.5 G: 0.25 SOLUTION INTRAVENOUS at 20:57

## 2021-01-01 RX ADMIN — Medication 300 MCG/HR: at 10:46

## 2021-01-01 RX ADMIN — PROPOFOL 50 MCG/KG/MIN: 10 INJECTION, EMULSION INTRAVENOUS at 20:10

## 2021-01-01 RX ADMIN — DEXTROSE AND SODIUM CHLORIDE 100 ML/HR: 5; 900 INJECTION, SOLUTION INTRAVENOUS at 12:36

## 2021-01-01 RX ADMIN — DOPAMINE HYDROCHLORIDE 14 MCG/KG/MIN: 40 INJECTION, SOLUTION, CONCENTRATE INTRAVENOUS at 15:57

## 2021-01-01 RX ADMIN — DOPAMINE HYDROCHLORIDE 14 MCG/KG/MIN: 40 INJECTION, SOLUTION, CONCENTRATE INTRAVENOUS at 01:21

## 2021-01-01 RX ADMIN — IOPAMIDOL 160 ML: 755 INJECTION, SOLUTION INTRAVENOUS at 16:04

## 2021-01-01 RX ADMIN — DOPAMINE HYDROCHLORIDE 14 MCG/KG/MIN: 40 INJECTION, SOLUTION, CONCENTRATE INTRAVENOUS at 17:44

## 2021-01-01 RX ADMIN — CALCIUM CHLORIDE, MAGNESIUM CHLORIDE, SODIUM CHLORIDE, SODIUM BICARBONATE, POTASSIUM CHLORIDE AND SODIUM PHOSPHATE DIBASIC DIHYDRATE 1000 ML/HR: 3.68; 3.05; 6.34; 3.09; .314; .187 INJECTION INTRAVENOUS at 22:41

## 2021-01-01 RX ADMIN — HYDROCODONE BITARTRATE AND ACETAMINOPHEN 1 TABLET: 5; 325 TABLET ORAL at 16:31

## 2021-01-01 RX ADMIN — Medication 300 MCG/HR: at 04:53

## 2021-01-01 RX ADMIN — MIDODRINE HYDROCHLORIDE 15 MG: 10 TABLET ORAL at 16:58

## 2021-01-01 RX ADMIN — DEXTROSE AND SODIUM CHLORIDE 100 ML/HR: 5; 900 INJECTION, SOLUTION INTRAVENOUS at 02:10

## 2021-01-01 RX ADMIN — PHENYLEPHRINE HYDROCHLORIDE 3 MCG/KG/MIN: 10 INJECTION INTRAVENOUS at 18:51

## 2021-01-01 RX ADMIN — SEVELAMER CARBONATE 1.6 G: 800 POWDER, FOR SUSPENSION ORAL at 17:52

## 2021-01-01 RX ADMIN — MIDODRINE HYDROCHLORIDE 15 MG: 10 TABLET ORAL at 10:35

## 2021-01-01 RX ADMIN — HEPARIN SODIUM 9 UNITS/KG/HR: 10000 INJECTION, SOLUTION INTRAVENOUS at 06:11

## 2021-01-01 RX ADMIN — MIDODRINE HYDROCHLORIDE 5 MG: 5 TABLET ORAL at 20:09

## 2021-01-01 RX ADMIN — MIDODRINE HYDROCHLORIDE 15 MG: 10 TABLET ORAL at 17:54

## 2021-01-01 RX ADMIN — Medication 300 MCG/HR: at 08:03

## 2021-01-01 RX ADMIN — PROPOFOL 50 MCG/KG/MIN: 10 INJECTION, EMULSION INTRAVENOUS at 07:14

## 2021-01-01 RX ADMIN — Medication 10 MG/HR: at 05:16

## 2021-01-01 RX ADMIN — SEVELAMER CARBONATE 800 MG: 800 TABLET, FILM COATED ORAL at 12:45

## 2021-01-01 RX ADMIN — Medication 300 MCG/HR: at 07:35

## 2021-01-01 RX ADMIN — FAMOTIDINE 20 MG: 20 TABLET, FILM COATED ORAL at 09:57

## 2021-01-01 RX ADMIN — NOREPINEPHRINE BITARTRATE 0.32 MCG/KG/MIN: 1 INJECTION, SOLUTION, CONCENTRATE INTRAVENOUS at 18:23

## 2021-01-01 RX ADMIN — SODIUM CHLORIDE, PRESERVATIVE FREE 10 ML: 5 INJECTION INTRAVENOUS at 20:11

## 2021-01-01 RX ADMIN — SEVELAMER CARBONATE 1.6 G: 800 POWDER, FOR SUSPENSION ORAL at 17:21

## 2021-01-01 RX ADMIN — ANTACID TABLETS 1 TABLET: 500 TABLET, CHEWABLE ORAL at 18:11

## 2021-01-01 RX ADMIN — ATORVASTATIN CALCIUM 80 MG: 40 TABLET, FILM COATED ORAL at 20:18

## 2021-01-01 RX ADMIN — ATORVASTATIN CALCIUM 80 MG: 40 TABLET, FILM COATED ORAL at 21:05

## 2021-01-01 RX ADMIN — DEXTROSE AND SODIUM CHLORIDE 100 ML/HR: 5; 900 INJECTION, SOLUTION INTRAVENOUS at 08:00

## 2021-01-01 RX ADMIN — FAMOTIDINE 20 MG: 20 TABLET, FILM COATED ORAL at 08:08

## 2021-01-01 RX ADMIN — Medication 10 MG/HR: at 21:57

## 2021-01-01 RX ADMIN — Medication 10 MG/HR: at 08:08

## 2021-01-01 RX ADMIN — Medication 0.3 MCG/KG/MIN: at 23:01

## 2021-01-01 RX ADMIN — CALCIUM CHLORIDE, MAGNESIUM CHLORIDE, SODIUM CHLORIDE, SODIUM BICARBONATE, POTASSIUM CHLORIDE AND SODIUM PHOSPHATE DIBASIC DIHYDRATE 1000 ML/HR: 3.68; 3.05; 6.34; 3.09; .314; .187 INJECTION INTRAVENOUS at 01:46

## 2021-01-01 RX ADMIN — VANCOMYCIN HYDROCHLORIDE 1000 MG: 1 INJECTION, SOLUTION INTRAVENOUS at 20:16

## 2021-01-01 RX ADMIN — INSULIN HUMAN 5 UNITS: 100 INJECTION, SOLUTION PARENTERAL at 09:38

## 2021-01-01 RX ADMIN — FAMOTIDINE 20 MG: 20 TABLET, FILM COATED ORAL at 08:09

## 2021-01-01 RX ADMIN — MIDODRINE HYDROCHLORIDE 15 MG: 10 TABLET ORAL at 17:05

## 2021-01-01 RX ADMIN — ALBUMIN HUMAN 25 G: 0.25 SOLUTION INTRAVENOUS at 09:37

## 2021-01-01 RX ADMIN — HYDROCODONE BITARTRATE AND ACETAMINOPHEN 1 TABLET: 5; 325 TABLET ORAL at 19:29

## 2021-01-01 RX ADMIN — SEVELAMER CARBONATE 1.6 G: 800 POWDER, FOR SUSPENSION ORAL at 08:34

## 2021-01-01 RX ADMIN — PROPOFOL 50 MCG/KG/MIN: 10 INJECTION, EMULSION INTRAVENOUS at 02:06

## 2021-01-01 RX ADMIN — MIDODRINE HYDROCHLORIDE 15 MG: 10 TABLET ORAL at 13:57

## 2021-01-01 RX ADMIN — SODIUM CHLORIDE, PRESERVATIVE FREE 10 ML: 5 INJECTION INTRAVENOUS at 12:45

## 2021-01-01 RX ADMIN — ALBUMIN HUMAN 12.5 G: 0.25 SOLUTION INTRAVENOUS at 08:02

## 2021-01-01 RX ADMIN — CHLORHEXIDINE GLUCONATE 15 ML: 1.2 SOLUTION ORAL at 20:22

## 2021-01-01 RX ADMIN — Medication 300 MCG/HR: at 22:05

## 2021-01-01 RX ADMIN — PROPOFOL 50 MCG/KG/MIN: 10 INJECTION, EMULSION INTRAVENOUS at 12:03

## 2021-01-01 RX ADMIN — APIXABAN 2.5 MG: 2.5 TABLET, FILM COATED ORAL at 20:11

## 2021-01-01 RX ADMIN — ASPIRIN 81 MG: 81 TABLET, CHEWABLE ORAL at 09:36

## 2021-01-01 RX ADMIN — TAZOBACTAM SODIUM AND PIPERACILLIN SODIUM 3.38 G: 375; 3 INJECTION, SOLUTION INTRAVENOUS at 05:50

## 2021-01-01 RX ADMIN — OXYMETAZOLINE HYDROCHLORIDE 2 SPRAY: 5 SPRAY NASAL at 20:59

## 2021-01-01 RX ADMIN — Medication 300 MCG/HR: at 06:45

## 2021-01-01 RX ADMIN — MIDODRINE HYDROCHLORIDE 15 MG: 10 TABLET ORAL at 21:05

## 2021-01-01 RX ADMIN — DOPAMINE HYDROCHLORIDE 2 MCG/KG/MIN: 160 INJECTION, SOLUTION INTRAVENOUS at 20:17

## 2021-01-01 RX ADMIN — Medication 300 MCG/HR: at 00:04

## 2021-01-01 RX ADMIN — APIXABAN 2.5 MG: 2.5 TABLET, FILM COATED ORAL at 08:02

## 2021-01-01 RX ADMIN — SEVELAMER CARBONATE 800 MG: 800 TABLET, FILM COATED ORAL at 18:56

## 2021-01-01 RX ADMIN — MIDODRINE HYDROCHLORIDE 15 MG: 10 TABLET ORAL at 14:51

## 2021-01-01 RX ADMIN — MIDODRINE HYDROCHLORIDE 10 MG: 10 TABLET ORAL at 08:07

## 2021-01-01 RX ADMIN — ALBUMIN HUMAN 25 G: 0.25 SOLUTION INTRAVENOUS at 08:11

## 2021-01-01 RX ADMIN — SODIUM ZIRCONIUM CYCLOSILICATE 10 G: 10 POWDER, FOR SUSPENSION ORAL at 16:52

## 2021-01-01 RX ADMIN — MIDODRINE HYDROCHLORIDE 15 MG: 10 TABLET ORAL at 22:00

## 2021-01-01 RX ADMIN — PYRIDOSTIGMINE BROMIDE 180 MG: 180 TABLET, EXTENDED RELEASE ORAL at 10:18

## 2021-01-01 RX ADMIN — MIDODRINE HYDROCHLORIDE 15 MG: 10 TABLET ORAL at 13:18

## 2021-01-01 RX ADMIN — MIDODRINE HYDROCHLORIDE 15 MG: 10 TABLET ORAL at 22:19

## 2021-01-01 RX ADMIN — ALBUTEROL SULFATE 2 PUFF: 90 AEROSOL, METERED RESPIRATORY (INHALATION) at 14:06

## 2021-01-01 RX ADMIN — HYDROCORTISONE SODIUM SUCCINATE 50 MG: 100 INJECTION, POWDER, FOR SOLUTION INTRAMUSCULAR; INTRAVENOUS at 04:35

## 2021-01-01 RX ADMIN — PROPOFOL 50 MCG/KG/MIN: 10 INJECTION, EMULSION INTRAVENOUS at 12:57

## 2021-01-01 RX ADMIN — SEVELAMER CARBONATE 1.6 G: 800 POWDER, FOR SUSPENSION ORAL at 17:07

## 2021-01-01 RX ADMIN — SODIUM CHLORIDE, PRESERVATIVE FREE 10 ML: 5 INJECTION INTRAVENOUS at 08:40

## 2021-01-01 RX ADMIN — Medication 1 MG/HR: at 10:23

## 2021-01-01 RX ADMIN — VANCOMYCIN HYDROCHLORIDE 1000 MG: 1 INJECTION, SOLUTION INTRAVENOUS at 20:01

## 2021-01-01 RX ADMIN — APIXABAN 2.5 MG: 2.5 TABLET, FILM COATED ORAL at 20:10

## 2021-01-01 RX ADMIN — Medication 300 MCG/HR: at 17:55

## 2021-01-01 RX ADMIN — ALBUMIN HUMAN 50 G: 0.25 SOLUTION INTRAVENOUS at 19:59

## 2021-01-01 RX ADMIN — Medication 300 MCG/HR: at 03:11

## 2021-01-01 RX ADMIN — PROPOFOL 50 MCG/KG/MIN: 10 INJECTION, EMULSION INTRAVENOUS at 21:28

## 2021-01-01 RX ADMIN — ATORVASTATIN CALCIUM 80 MG: 40 TABLET, FILM COATED ORAL at 21:41

## 2021-01-01 RX ADMIN — Medication 0.24 MCG/KG/MIN: at 03:17

## 2021-01-01 RX ADMIN — APIXABAN 2.5 MG: 2.5 TABLET, FILM COATED ORAL at 08:07

## 2021-01-01 RX ADMIN — HYDROCORTISONE SODIUM SUCCINATE 50 MG: 100 INJECTION, POWDER, FOR SOLUTION INTRAMUSCULAR; INTRAVENOUS at 12:32

## 2021-01-01 RX ADMIN — PROPOFOL 50 MCG/KG/MIN: 10 INJECTION, EMULSION INTRAVENOUS at 06:25

## 2021-01-01 RX ADMIN — MIDODRINE HYDROCHLORIDE 15 MG: 10 TABLET ORAL at 07:38

## 2021-01-01 RX ADMIN — TAZOBACTAM SODIUM AND PIPERACILLIN SODIUM 3.38 G: 375; 3 INJECTION, SOLUTION INTRAVENOUS at 05:20

## 2021-01-01 RX ADMIN — MIDODRINE HYDROCHLORIDE 15 MG: 10 TABLET ORAL at 12:44

## 2021-01-01 RX ADMIN — Medication 300 MCG/HR: at 15:00

## 2021-01-01 RX ADMIN — PROPOFOL 50 MCG/KG/MIN: 10 INJECTION, EMULSION INTRAVENOUS at 12:14

## 2021-01-01 RX ADMIN — ATORVASTATIN CALCIUM 80 MG: 40 TABLET, FILM COATED ORAL at 21:10

## 2021-01-01 RX ADMIN — HEPARIN SODIUM 9 UNITS/KG/HR: 10000 INJECTION, SOLUTION INTRAVENOUS at 15:54

## 2021-01-01 RX ADMIN — MIDODRINE HYDROCHLORIDE 15 MG: 10 TABLET ORAL at 21:15

## 2021-01-01 RX ADMIN — SEVELAMER CARBONATE 800 MG: 800 TABLET, FILM COATED ORAL at 12:38

## 2021-01-01 RX ADMIN — SODIUM CHLORIDE, PRESERVATIVE FREE 10 ML: 5 INJECTION INTRAVENOUS at 21:12

## 2021-01-01 RX ADMIN — MIDODRINE HYDROCHLORIDE 15 MG: 10 TABLET ORAL at 14:29

## 2021-01-01 RX ADMIN — PHENYLEPHRINE HYDROCHLORIDE 3 MCG/KG/MIN: 10 INJECTION INTRAVENOUS at 09:58

## 2021-01-01 RX ADMIN — ATORVASTATIN CALCIUM 80 MG: 40 TABLET, FILM COATED ORAL at 19:56

## 2021-01-01 RX ADMIN — Medication 10 MG/HR: at 02:25

## 2021-01-01 RX ADMIN — PANTOPRAZOLE SODIUM 40 MG: 40 TABLET, DELAYED RELEASE ORAL at 12:45

## 2021-01-01 RX ADMIN — Medication 0.3 MCG/KG/MIN: at 18:57

## 2021-01-01 RX ADMIN — CALCIUM CHLORIDE, MAGNESIUM CHLORIDE, SODIUM CHLORIDE, SODIUM BICARBONATE, POTASSIUM CHLORIDE AND SODIUM PHOSPHATE DIBASIC DIHYDRATE 1000 ML/HR: 3.68; 3.05; 6.34; 3.09; .314; .187 INJECTION INTRAVENOUS at 11:05

## 2021-01-01 RX ADMIN — TAZOBACTAM SODIUM AND PIPERACILLIN SODIUM 3.38 G: 375; 3 INJECTION, SOLUTION INTRAVENOUS at 13:23

## 2021-01-01 RX ADMIN — FAMOTIDINE 20 MG: 20 TABLET, FILM COATED ORAL at 08:02

## 2021-01-01 RX ADMIN — PROPOFOL 50 MCG/KG/MIN: 10 INJECTION, EMULSION INTRAVENOUS at 11:33

## 2021-01-01 RX ADMIN — FAMOTIDINE 20 MG: 20 TABLET, FILM COATED ORAL at 09:19

## 2021-01-01 RX ADMIN — Medication 300 MCG/HR: at 05:57

## 2021-01-01 RX ADMIN — DEXTROSE AND SODIUM CHLORIDE 100 ML/HR: 5; 900 INJECTION, SOLUTION INTRAVENOUS at 16:20

## 2021-01-01 RX ADMIN — SEVELAMER CARBONATE 1.6 G: 800 POWDER, FOR SUSPENSION ORAL at 17:00

## 2021-01-01 RX ADMIN — DOPAMINE HYDROCHLORIDE 14 MCG/KG/MIN: 40 INJECTION, SOLUTION, CONCENTRATE INTRAVENOUS at 09:49

## 2021-01-01 RX ADMIN — Medication 10 MG/HR: at 15:58

## 2021-01-01 RX ADMIN — SODIUM CHLORIDE, PRESERVATIVE FREE 10 ML: 5 INJECTION INTRAVENOUS at 20:10

## 2021-01-01 RX ADMIN — ASPIRIN 81 MG CHEWABLE TABLET 81 MG: 81 TABLET CHEWABLE at 08:56

## 2021-01-01 RX ADMIN — ATORVASTATIN CALCIUM 80 MG: 40 TABLET, FILM COATED ORAL at 20:10

## 2021-01-01 RX ADMIN — APIXABAN 2.5 MG: 2.5 TABLET, FILM COATED ORAL at 09:36

## 2021-01-01 RX ADMIN — MIDODRINE HYDROCHLORIDE 15 MG: 10 TABLET ORAL at 14:15

## 2021-01-01 RX ADMIN — ASPIRIN 81 MG CHEWABLE TABLET 81 MG: 81 TABLET CHEWABLE at 09:05

## 2021-01-01 RX ADMIN — DEXTROSE AND SODIUM CHLORIDE 100 ML/HR: 5; 900 INJECTION, SOLUTION INTRAVENOUS at 06:43

## 2021-01-01 RX ADMIN — ASPIRIN 81 MG: 81 TABLET, CHEWABLE ORAL at 08:15

## 2021-01-01 RX ADMIN — NOREPINEPHRINE BITARTRATE 0.5 MCG/KG/MIN: 1 INJECTION, SOLUTION, CONCENTRATE INTRAVENOUS at 11:57

## 2021-01-01 RX ADMIN — MIDODRINE HYDROCHLORIDE 15 MG: 10 TABLET ORAL at 08:11

## 2021-01-01 RX ADMIN — MIDODRINE HYDROCHLORIDE 5 MG: 5 TABLET ORAL at 08:26

## 2021-01-01 RX ADMIN — ASPIRIN 81 MG CHEWABLE TABLET 81 MG: 81 TABLET CHEWABLE at 08:05

## 2021-01-01 RX ADMIN — Medication 300 MCG/HR: at 20:31

## 2021-01-01 RX ADMIN — HYDROCODONE BITARTRATE AND ACETAMINOPHEN 1 TABLET: 5; 325 TABLET ORAL at 12:44

## 2021-01-01 RX ADMIN — DEXTROSE MONOHYDRATE 50 ML: 500 INJECTION PARENTERAL at 09:34

## 2021-01-01 RX ADMIN — PHENYLEPHRINE HYDROCHLORIDE 3 MCG/KG/MIN: 10 INJECTION INTRAVENOUS at 08:09

## 2021-01-01 RX ADMIN — APIXABAN 2.5 MG: 2.5 TABLET, FILM COATED ORAL at 13:40

## 2021-01-01 RX ADMIN — APIXABAN 2.5 MG: 2.5 TABLET, FILM COATED ORAL at 19:56

## 2021-01-01 RX ADMIN — PHENYLEPHRINE HYDROCHLORIDE 3 MCG/KG/MIN: 10 INJECTION INTRAVENOUS at 21:19

## 2021-01-01 RX ADMIN — PANTOPRAZOLE SODIUM 40 MG: 40 TABLET, DELAYED RELEASE ORAL at 12:40

## 2021-01-01 RX ADMIN — Medication 10 MG/HR: at 16:01

## 2021-01-01 RX ADMIN — VANCOMYCIN HYDROCHLORIDE 1250 MG: 10 INJECTION, POWDER, LYOPHILIZED, FOR SOLUTION INTRAVENOUS at 21:10

## 2021-01-01 RX ADMIN — PHENYLEPHRINE HYDROCHLORIDE 3 MCG/KG/MIN: 10 INJECTION INTRAVENOUS at 04:23

## 2021-01-01 RX ADMIN — HYDROCODONE BITARTRATE AND ACETAMINOPHEN 1 TABLET: 5; 325 TABLET ORAL at 23:48

## 2021-01-01 RX ADMIN — TAZOBACTAM SODIUM AND PIPERACILLIN SODIUM 3.38 G: 375; 3 INJECTION, SOLUTION INTRAVENOUS at 05:51

## 2021-01-01 RX ADMIN — CHLORHEXIDINE GLUCONATE 15 ML: 1.2 SOLUTION ORAL at 08:26

## 2021-01-01 RX ADMIN — ATORVASTATIN CALCIUM 80 MG: 40 TABLET, FILM COATED ORAL at 20:22

## 2021-01-01 RX ADMIN — MIDODRINE HYDROCHLORIDE 15 MG: 10 TABLET ORAL at 13:48

## 2021-01-01 RX ADMIN — PROPOFOL 50 MCG/KG/MIN: 10 INJECTION, EMULSION INTRAVENOUS at 16:01

## 2021-01-01 RX ADMIN — ATORVASTATIN CALCIUM 80 MG: 40 TABLET, FILM COATED ORAL at 21:00

## 2021-01-01 RX ADMIN — SODIUM CHLORIDE, PRESERVATIVE FREE 10 ML: 5 INJECTION INTRAVENOUS at 20:12

## 2021-01-01 RX ADMIN — HYDROCODONE BITARTRATE AND ACETAMINOPHEN 1 TABLET: 5; 325 TABLET ORAL at 04:20

## 2021-01-01 RX ADMIN — Medication 10 MG/HR: at 01:02

## 2021-01-01 RX ADMIN — PROPOFOL 50 MCG/KG/MIN: 10 INJECTION, EMULSION INTRAVENOUS at 02:24

## 2021-01-01 RX ADMIN — TAZOBACTAM SODIUM AND PIPERACILLIN SODIUM 3.38 G: 375; 3 INJECTION, SOLUTION INTRAVENOUS at 21:48

## 2021-01-01 RX ADMIN — FAMOTIDINE 20 MG: 20 TABLET, FILM COATED ORAL at 08:18

## 2021-01-01 RX ADMIN — DOPAMINE HYDROCHLORIDE 14 MCG/KG/MIN: 40 INJECTION, SOLUTION, CONCENTRATE INTRAVENOUS at 17:05

## 2021-01-01 RX ADMIN — TAZOBACTAM SODIUM AND PIPERACILLIN SODIUM 3.38 G: 375; 3 INJECTION, SOLUTION INTRAVENOUS at 05:04

## 2021-01-01 RX ADMIN — APIXABAN 2.5 MG: 2.5 TABLET, FILM COATED ORAL at 08:15

## 2021-01-01 RX ADMIN — ATORVASTATIN CALCIUM 80 MG: 40 TABLET, FILM COATED ORAL at 19:30

## 2021-01-01 RX ADMIN — DOPAMINE HYDROCHLORIDE 14 MCG/KG/MIN: 40 INJECTION, SOLUTION, CONCENTRATE INTRAVENOUS at 01:28

## 2021-01-01 RX ADMIN — Medication 300 MCG/HR: at 02:04

## 2021-01-01 RX ADMIN — ASPIRIN 81 MG CHEWABLE TABLET 81 MG: 81 TABLET CHEWABLE at 08:11

## 2021-01-01 RX ADMIN — TAZOBACTAM SODIUM AND PIPERACILLIN SODIUM 3.38 G: 375; 3 INJECTION, SOLUTION INTRAVENOUS at 13:18

## 2021-01-01 RX ADMIN — ALBUMIN HUMAN 12.5 G: 0.25 SOLUTION INTRAVENOUS at 16:03

## 2021-01-01 RX ADMIN — CHLORHEXIDINE GLUCONATE 15 ML: 1.2 SOLUTION ORAL at 20:24

## 2021-01-01 RX ADMIN — SEVELAMER CARBONATE 1.6 G: 800 POWDER, FOR SUSPENSION ORAL at 08:39

## 2021-01-01 RX ADMIN — DOPAMINE HYDROCHLORIDE 14 MCG/KG/MIN: 40 INJECTION, SOLUTION, CONCENTRATE INTRAVENOUS at 03:07

## 2021-01-01 RX ADMIN — SODIUM CHLORIDE, PRESERVATIVE FREE 10 ML: 5 INJECTION INTRAVENOUS at 08:18

## 2021-01-01 RX ADMIN — CALCIUM CHLORIDE, MAGNESIUM CHLORIDE, SODIUM CHLORIDE, SODIUM BICARBONATE, POTASSIUM CHLORIDE AND SODIUM PHOSPHATE DIBASIC DIHYDRATE 1000 ML/HR: 3.68; 3.05; 6.34; 3.09; .314; .187 INJECTION INTRAVENOUS at 01:47

## 2021-01-01 RX ADMIN — LIDOCAINE HYDROCHLORIDE 10 ML: 10 INJECTION, SOLUTION EPIDURAL; INFILTRATION; INTRACAUDAL; PERINEURAL at 15:38

## 2021-01-01 RX ADMIN — FAMOTIDINE 20 MG: 20 TABLET, FILM COATED ORAL at 08:34

## 2021-01-01 RX ADMIN — MIDODRINE HYDROCHLORIDE 15 MG: 10 TABLET ORAL at 21:39

## 2021-01-01 RX ADMIN — MIDODRINE HYDROCHLORIDE 10 MG: 10 TABLET ORAL at 11:00

## 2021-01-01 RX ADMIN — Medication 10 MG/HR: at 21:00

## 2021-01-01 RX ADMIN — ANTACID TABLETS 1 TABLET: 500 TABLET, CHEWABLE ORAL at 21:56

## 2021-01-01 RX ADMIN — MIDODRINE HYDROCHLORIDE 15 MG: 10 TABLET ORAL at 05:18

## 2021-01-01 RX ADMIN — FAMOTIDINE 20 MG: 20 TABLET, FILM COATED ORAL at 08:39

## 2021-01-01 RX ADMIN — ATORVASTATIN CALCIUM 80 MG: 40 TABLET, FILM COATED ORAL at 22:00

## 2021-01-01 RX ADMIN — Medication 300 MCG/HR: at 11:52

## 2021-01-01 RX ADMIN — PROPOFOL 50 MCG/KG/MIN: 10 INJECTION, EMULSION INTRAVENOUS at 20:41

## 2021-01-01 RX ADMIN — CHLORHEXIDINE GLUCONATE 15 ML: 1.2 SOLUTION ORAL at 08:01

## 2021-01-01 RX ADMIN — ASPIRIN 81 MG CHEWABLE TABLET 81 MG: 81 TABLET CHEWABLE at 09:19

## 2021-01-01 RX ADMIN — PROPOFOL 50 MCG/KG/MIN: 10 INJECTION, EMULSION INTRAVENOUS at 08:02

## 2021-01-01 RX ADMIN — HYDROCODONE BITARTRATE AND ACETAMINOPHEN 1 TABLET: 5; 325 TABLET ORAL at 13:41

## 2021-01-01 RX ADMIN — SODIUM CHLORIDE, PRESERVATIVE FREE 10 ML: 5 INJECTION INTRAVENOUS at 11:08

## 2021-01-01 RX ADMIN — ATORVASTATIN CALCIUM 80 MG: 40 TABLET, FILM COATED ORAL at 21:07

## 2021-01-01 RX ADMIN — MIDODRINE HYDROCHLORIDE 15 MG: 10 TABLET ORAL at 21:06

## 2021-01-01 RX ADMIN — NOREPINEPHRINE BITARTRATE 0.3 MCG/KG/MIN: 1 INJECTION, SOLUTION, CONCENTRATE INTRAVENOUS at 17:59

## 2021-01-01 RX ADMIN — SEVELAMER CARBONATE 1.6 G: 800 POWDER, FOR SUSPENSION ORAL at 12:17

## 2021-01-01 RX ADMIN — ACETAMINOPHEN 650 MG: 325 TABLET, FILM COATED ORAL at 21:28

## 2021-01-01 RX ADMIN — NOREPINEPHRINE BITARTRATE 0.3 MCG/KG/MIN: 1 INJECTION, SOLUTION, CONCENTRATE INTRAVENOUS at 17:36

## 2021-01-01 RX ADMIN — PANTOPRAZOLE SODIUM 40 MG: 40 TABLET, DELAYED RELEASE ORAL at 06:28

## 2021-01-01 RX ADMIN — NOREPINEPHRINE BITARTRATE 0.32 MCG/KG/MIN: 1 INJECTION, SOLUTION, CONCENTRATE INTRAVENOUS at 13:36

## 2021-01-01 RX ADMIN — DEXTROSE AND SODIUM CHLORIDE 100 ML/HR: 5; 900 INJECTION, SOLUTION INTRAVENOUS at 22:13

## 2021-01-01 RX ADMIN — DOPAMINE HYDROCHLORIDE 14 MCG/KG/MIN: 40 INJECTION, SOLUTION, CONCENTRATE INTRAVENOUS at 17:21

## 2021-01-01 RX ADMIN — PROPOFOL 50 MCG/KG/MIN: 10 INJECTION, EMULSION INTRAVENOUS at 07:53

## 2021-01-01 RX ADMIN — SODIUM CHLORIDE, PRESERVATIVE FREE 10 ML: 5 INJECTION INTRAVENOUS at 21:15

## 2021-01-01 RX ADMIN — Medication 10 MG/HR: at 10:32

## 2021-01-01 RX ADMIN — PHENYLEPHRINE HYDROCHLORIDE 0.5 MCG/KG/MIN: 10 INJECTION INTRAVENOUS at 10:51

## 2021-01-01 RX ADMIN — NOREPINEPHRINE BITARTRATE 0.5 MCG/KG/MIN: 1 INJECTION, SOLUTION, CONCENTRATE INTRAVENOUS at 05:47

## 2021-01-01 RX ADMIN — HYDROCODONE BITARTRATE AND ACETAMINOPHEN 1 TABLET: 5; 325 TABLET ORAL at 12:50

## 2021-01-01 RX ADMIN — PROPOFOL 50 MCG/KG/MIN: 10 INJECTION, EMULSION INTRAVENOUS at 11:18

## 2021-01-01 RX ADMIN — PHENYLEPHRINE HYDROCHLORIDE 3 MCG/KG/MIN: 10 INJECTION INTRAVENOUS at 06:37

## 2021-01-01 RX ADMIN — MIDODRINE HYDROCHLORIDE 15 MG: 10 TABLET ORAL at 10:36

## 2021-01-01 RX ADMIN — DEXTROSE AND SODIUM CHLORIDE 50 ML/HR: 5; 900 INJECTION, SOLUTION INTRAVENOUS at 05:58

## 2021-01-01 RX ADMIN — Medication 30 MG: at 02:29

## 2021-01-01 RX ADMIN — PROPOFOL 50 MCG/KG/MIN: 10 INJECTION, EMULSION INTRAVENOUS at 19:05

## 2021-01-01 RX ADMIN — OXYMETAZOLINE HYDROCHLORIDE 2 SPRAY: 5 SPRAY NASAL at 08:12

## 2021-01-01 RX ADMIN — PROPOFOL 50 MCG/KG/MIN: 10 INJECTION, EMULSION INTRAVENOUS at 11:55

## 2021-01-01 RX ADMIN — HYDROCODONE BITARTRATE AND ACETAMINOPHEN 1 TABLET: 5; 325 TABLET ORAL at 19:01

## 2021-01-01 RX ADMIN — SEVELAMER CARBONATE 800 MG: 800 TABLET, FILM COATED ORAL at 09:36

## 2021-01-01 RX ADMIN — ASPIRIN 81 MG CHEWABLE TABLET 81 MG: 81 TABLET CHEWABLE at 08:40

## 2021-01-01 RX ADMIN — ATORVASTATIN CALCIUM 80 MG: 40 TABLET, FILM COATED ORAL at 21:48

## 2021-01-01 RX ADMIN — ASPIRIN 81 MG CHEWABLE TABLET 81 MG: 81 TABLET CHEWABLE at 08:26

## 2021-01-01 RX ADMIN — ANTICOAGULANT CITRATE DEXTROSE SOLUTION FORMULA A 1.6 ML: 12.25; 11; 3.65 SOLUTION INTRAVENOUS at 18:18

## 2021-01-01 RX ADMIN — ATORVASTATIN CALCIUM 80 MG: 40 TABLET, FILM COATED ORAL at 22:12

## 2021-01-01 RX ADMIN — PSEUDOEPHEDRINE HCL 60 MG: 30 TABLET, FILM COATED ORAL at 10:15

## 2021-01-01 RX ADMIN — PROPOFOL 35 MCG/KG/MIN: 10 INJECTION, EMULSION INTRAVENOUS at 10:52

## 2021-01-01 RX ADMIN — NOREPINEPHRINE BITARTRATE 0.28 MCG/KG/MIN: 1 INJECTION, SOLUTION, CONCENTRATE INTRAVENOUS at 08:09

## 2021-10-20 PROBLEM — R09.02 HYPOXIA: Status: ACTIVE | Noted: 2021-01-01

## 2021-10-20 PROBLEM — Z89.511 S/P BKA (BELOW KNEE AMPUTATION), RIGHT (HCC): Status: ACTIVE | Noted: 2021-01-01

## 2021-10-20 PROBLEM — I95.0 IDIOPATHIC HYPOTENSION: Status: ACTIVE | Noted: 2021-01-01

## 2021-10-20 PROBLEM — N18.6 ESRD (END STAGE RENAL DISEASE) (HCC): Status: ACTIVE | Noted: 2021-01-01

## 2021-10-20 PROBLEM — J44.9 COPD (CHRONIC OBSTRUCTIVE PULMONARY DISEASE) (HCC): Status: ACTIVE | Noted: 2021-01-01

## 2021-10-20 PROBLEM — E87.70 VOLUME OVERLOAD: Status: ACTIVE | Noted: 2021-01-01

## 2021-10-30 NOTE — OUTREACH NOTE
Prep Survey      Responses   [facility] patient discharged from? [place]   Is LACE score < 7 ? No   Emergency Room discharge w/ pulse ox? No   Eligibility [facility]   Date of Admission 10/20/21   Date of Discharge 10/29/21   Discharge diagnosis [place] overload   Does the patient have one of the following disease processes/diagnoses(primary or secondary)? Other   Does the patient have Home health ordered? Yes   What is the Home health agency?  [facility]   Is there a DME ordered? Yes   What DME was ordered? [facility]   Comments regarding appointments Listed   Medication alerts for this patient ASA, Lipitor   Prep survey completed? Yes          Cecilia Rios RN

## 2021-11-01 NOTE — OUTREACH NOTE
Call Center TCM Note      Responses   [facility] patient discharged from? [place]   Does the patient have one of the following disease processes/diagnoses(primary or secondary)? Other   TCM attempt successful? No   Unsuccessful attempts Attempt 1          Terese Garcia RN    11/2/2021, 12:28 EDT

## 2021-11-02 NOTE — OUTREACH NOTE
Call Center TCM Note      Responses   [facility] patient discharged from? [place]   Does the patient have one of the following disease processes/diagnoses(primary or secondary)? Other   TCM attempt successful? Yes   Call start time 1249   Call end time 1301   Discharge diagnosis Volume overload, hypoxia, ESRD, COPD, idiopathic hypotension   Is patient permission given to speak with other caregiver? No   Meds reviewed with patient/caregiver? Yes   Does the patient have all medications ordered at discharge? No   What is keeping the patient from filling the prescriptions? Lost script/didn't receive  [Patient reports that he did not get the caffeine liquid ordered. ]   Notified Case Management Script issues   Is the patient taking all medications as directed (includes completed medication regime)? No   What is preventing the patient from taking all medications as directed? Other  [See above. ]   Medication comments Patient reports that he has rescue inhaler he carries with him.    Comments regarding appointments Message routed to PCP office that patient needs new appt changed due to appt conflict. He states that he has pain clinic appt that he cannot change. Message routed to PCP office.    Does the patient have a primary care provider?  Yes   Does the patient have an appointment with their PCP within 7 days of discharge? Yes   Comments regarding PCP New Patient with Paulino Katz MD Thursday Nov 4, 2021 11:15 AM   Has the patient kept scheduled appointments due by today? Yes  [Dialysis mon, wed, Friday mornings. ]   What is the Home health agency?  [facility]   Has home health visited the patient within 72 hours of discharge? Yes   What DME was ordered? [facility]   Has all DME been delivered? Yes   DME comments Patient reports wearing home O23L.    Psychosocial issues? No   Did the patient receive a copy of their discharge instructions? Yes   Nursing interventions Reviewed instructions with patient   What is the  patient's perception of their health status since discharge? Improving   Is the patient/caregiver able to teach back signs and symptoms related to disease process for when to call PCP? Yes   Is the patient/caregiver able to teach back signs and symptoms related to disease process for when to call 911? Yes   Is the patient/caregiver able to teach back the hierarchy of who to call/visit for symptoms/problems? PCP, Specialist, Home health nurse, Urgent Care, ED, 911 Yes   If the patient is a current smoker, are they able to teach back resources for cessation? Not a smoker   TCM call completed? Yes          Genevieve Luna RN    11/2/2021, 13:01 EDT

## 2021-11-03 NOTE — TELEPHONE ENCOUNTER
Caller: ANA    Relationship: Other [facility]    Best call back number:     What is the best time to reach you: ANYTIME    Who are you requesting to speak with (clinical staff, provider,  specific staff member): [facility] STAFF    Do you know the name of the person who called: ANA    What was the call regarding: HOME HEALTH WAS CALLING TO SEE IF DR SANCHEZ WILL FOLLOW FOR SKILLED NURSING PT AND OT; PATIENT HAS AN APPT TOMORROW WITH DR SANCHEZ    Do you require a callback: ONLY IF NEEDED

## 2021-11-08 NOTE — OUTREACH NOTE
Medical Week 2 Survey      Responses   [facility] patient discharged from? [place]   Does the patient have one of the following disease processes/diagnoses(primary or secondary)? Other   Week 2 attempt successful? Yes   Call start time 1717   Discharge diagnosis Volume overload, hypoxia, ESRD, COPD, idiopathic hypotension   Call end time 1720   Meds reviewed with patient/caregiver? Yes   Is the patient having any side effects they believe may be caused by any medication additions or changes? No   Does the patient have all medications ordered at discharge? Yes   Is the patient taking all medications as directed (includes completed medication regime)? Yes   Does the patient have a primary care provider?  Yes   Does the patient have an appointment with their PCP within 7 days of discharge? Yes   Comments Dialysis MWF    What is the Home health agency?  [facility]   Has home health visited the patient within 72 hours of discharge? Yes   What DME was ordered? [facility]   Has all DME been delivered? Yes   DME comments Patient reports wearing home O23L.    Psychosocial issues? No   Did the patient receive a copy of their discharge instructions? Yes   Nursing interventions Reviewed instructions with patient   What is the patient's perception of their health status since discharge? Improving   Is the patient/caregiver able to teach back signs and symptoms related to disease process for when to call PCP? Yes   Is the patient/caregiver able to teach back signs and symptoms related to disease process for when to call 911? Yes   Is the patient/caregiver able to teach back the hierarchy of who to call/visit for symptoms/problems? PCP, Specialist, Home health nurse, Urgent Care, ED, 911 Yes   If the patient is a current smoker, are they able to teach back resources for cessation? Not a smoker   Week 2 Call Completed? Yes   Wrap up additional comments States he was unable to keep apptment with PCP because he gets tired from going to  HD. enc. to make apptment.           Genevieve Coppola RN

## 2021-11-16 NOTE — OUTREACH NOTE
Medical Week 3 Survey      Responses   [facility] patient discharged from? [place]   Does the patient have one of the following disease processes/diagnoses(primary or secondary)? Other   Week 3 attempt successful? No   Unsuccessful attempts Attempt 1          Elsa Arnold RN

## 2021-11-18 PROBLEM — B18.2 CHRONIC HEPATITIS C (HCC): Status: ACTIVE | Noted: 2021-01-01

## 2021-11-18 PROBLEM — J44.9 COPD (CHRONIC OBSTRUCTIVE PULMONARY DISEASE) (HCC): Chronic | Status: ACTIVE | Noted: 2021-01-01

## 2021-11-18 PROBLEM — Z79.01 ANTICOAGULATED: Chronic | Status: ACTIVE | Noted: 2021-01-01

## 2021-11-18 PROBLEM — Z99.2 ESRD (END STAGE RENAL DISEASE) ON DIALYSIS (HCC): Status: ACTIVE | Noted: 2021-01-01

## 2021-11-18 PROBLEM — I95.0 IDIOPATHIC HYPOTENSION: Chronic | Status: ACTIVE | Noted: 2021-01-01

## 2021-11-18 PROBLEM — N18.6 ESRD (END STAGE RENAL DISEASE) ON DIALYSIS (HCC): Status: ACTIVE | Noted: 2021-01-01

## 2021-11-18 PROBLEM — N18.9 ACUTE RENAL FAILURE SUPERIMPOSED ON CHRONIC KIDNEY DISEASE (HCC): Status: ACTIVE | Noted: 2021-01-01

## 2021-11-18 PROBLEM — E87.5 HYPERKALEMIA: Status: ACTIVE | Noted: 2021-01-01

## 2021-11-18 PROBLEM — J96.21 ACUTE ON CHRONIC RESPIRATORY FAILURE WITH HYPOXIA (HCC): Status: ACTIVE | Noted: 2021-01-01

## 2021-11-18 PROBLEM — N17.9 ACUTE RENAL FAILURE SUPERIMPOSED ON CHRONIC KIDNEY DISEASE (HCC): Status: ACTIVE | Noted: 2021-01-01

## 2021-11-18 PROBLEM — Z87.891 FORMER SMOKER: Status: ACTIVE | Noted: 2021-01-01

## 2021-11-18 PROBLEM — B18.2 CHRONIC HEPATITIS C (HCC): Chronic | Status: ACTIVE | Noted: 2021-01-01

## 2021-11-18 PROBLEM — Z79.01 ANTICOAGULATED: Status: ACTIVE | Noted: 2021-01-01

## 2021-11-19 NOTE — OUTREACH NOTE
Medical Week 3 Survey      Responses   [facility] patient discharged from? [place]   Does the patient have one of the following disease processes/diagnoses(primary or secondary)? Other   Week 3 attempt successful? No   Revoke Readmitted          Sri Vo RN

## 2021-11-22 PROBLEM — D61.818 PANCYTOPENIA (HCC): Status: ACTIVE | Noted: 2021-01-01

## 2021-11-22 PROBLEM — I51.89 RIGHT ATRIAL MASS: Status: ACTIVE | Noted: 2021-01-01

## 2021-11-22 PROBLEM — I07.8 TRICUSPID VALVE MASS: Status: ACTIVE | Noted: 2021-01-01

## 2021-12-01 NOTE — PLAN OF CARE
Goal Outcome Evaluation:           Progress: no change  Outcome Summary: Pt remains on the vent with CRRT and pressor support. Dr Ordonez talked with mom and plans to call again tomorrow when pt's sister is present. continue goals discussionPalliative Team meeting [id number], Kj Ordonez DO, Natasha Tarango APRN, Magdalena Mederos RN, CHPN, Nicky Calabrese RN CHPN, Taylor [facility], Lauryn Silva RN CHPN, Heike Mock RN, BSN, CHPN

## 2021-12-01 NOTE — PROGRESS NOTES
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  He presented to the emergency room with worsening renal failure after missing hemodialysis.  He had significant hyperkalemia and was hypotensive with systolic blood pressure 68-73.  Bedside access could not be achieved because of his chronically occluded vessels.  He did go to IR and ultimately they were able to place an access and hemodialysis was initiated.  However he did require intubation mechanical ventilation initially.  Blood pressure worsened despite empiric broad-spectrum antibiotics.  He was placed on dopamine, norepinephrine, vasopressin and phenylephrine.  Echocardiogram revealed a 1.3 cm mass on his tricuspid valve and an additional mass affixed to his right atrium with reduced systolic function.  Cardiology felt they were consistent with blood clots.  Luisis was transition to a heparin drip.  Unfortunately he developed a serious nosebleed requiring transfusion and nasal packing.  Heparin was held and then restarted and bleeding recurred and now he is off anticoagulation.      Interval History:     Remains critically ill on 3 pressors, phenylephrine 3 mics per kilogram per minute, dopamine 14 mics per kilogram per minute, norepinephrine 3 mics per kilogram per minute.  He is on CRRT .  This morning his blood pressure has been labile and dipped down into the 60s despite vasopressor support.  Now he has desaturated to 60%.  He is on 100% oxygen    Review of Systems:    All  "systems were reviewed and negative except as noted in subjective.    Medical history, surgical history, social history, family history reviewed    Objective     Intake/Output:    Intake/Output Summary (Last 24 hours) at 12/1/2021 1451  Last data filed at 12/1/2021 1400  Gross per 24 hour   Intake 5575 ml   Output 5300 ml   Net 275 ml       Nutrition:  NPO Diet    Infusions:  dextrose 5 % and sodium chloride 0.9 %, 100 mL/hr, Last Rate: 100 mL/hr (12/01/21 0511)  DOPamine, 2-20 mcg/kg/min (Order-Specific), Last Rate: 14 mcg/kg/min (12/01/21 0256)  fentanyl 10 mcg/mL,  mcg/hr, Last Rate: 300 mcg/hr (12/01/21 1337)  Pharmacy Consult, , Last Rate: Stopped (11/29/21 1123)  midazolam, 1-10 mg/hr, Last Rate: 10 mg/hr (12/01/21 0808)  norepinephrine, 0.02-0.5 mcg/kg/min (Order-Specific), Last Rate: 0.32 mcg/kg/min (12/01/21 1336)  phenylephrine, 0.5-3 mcg/kg/min (Order-Specific), Last Rate: 3 mcg/kg/min (12/01/21 0809)  Phoxillum BK4/2.5, 1,000 mL/hr, Last Rate: 1,000 mL/hr (12/01/21 1248)  Phoxillum BK4/2.5, 1,000 mL/hr, Last Rate: 1,000 mL/hr (12/01/21 1248)  Phoxillum BK4/2.5, 1,000 mL/hr, Last Rate: 1,000 mL/hr (12/01/21 1248)  propofol, 5-50 mcg/kg/min, Last Rate: 50 mcg/kg/min (12/01/21 1108)        Mechanical Ventilator Settings:            Resp Rate (Set): 16     FiO2 (%): 50 %  PEEP/CPAP (cm H2O): 8 cm H20    Minute Ventilation (L/min) (Obs): 6.49 L/min  Resp Rate (Observed) Vent: 16  I:E Ratio (Set): 1:0.40  I:E Ratio (Obs): 1:2.50    PIP Observed (cm H2O): 25 cm H2O  Plateau Pressure (cm H2O): (S) 22 cm H2O    Telemetry: Sinus rhythm             Vital Signs  Blood pressure (!) 101/32, pulse 68, temperature 94.1 °F (34.5 °C), temperature source Rectal, resp. rate 16, height 182.9 cm (72.01\"), weight 69.3 kg (152 lb 12.5 oz), SpO2 94 %.    Physical Exam:  General Appearance:   Middle-aged gentleman sedated, intubated   Head:   Atraumatic   Eyes:          Conjunctiva pale   Ears:     Throat:  Orally intubated. "  Left nostril packing removed   Neck:  Right hemodialysis access   Back:      Lungs:    Breath sounds are bilateral, coarse rhonchi    Heart:   Regular, S1, S2 auscultated   Abdomen:    Absent bowel sounds, slightly distended   Rectal:   Deferred   Extremities:  Left femoral deep line   Pulses:    Skin:  Cool and dry   Lymph nodes:    Neurologic:  Sedated      Results Review:     I reviewed the patient's new clinical results.   Results from last 7 days   Lab Units 12/01/21  1352 12/01/21  0629 11/30/21  2206   SODIUM mmol/L 136 136 136   POTASSIUM mmol/L 4.0 3.8 4.0   CHLORIDE mmol/L 105 103 103   CO2 mmol/L 23.0 22.0 23.0   BUN mg/dL 4* 4* 4*   CREATININE mg/dL 0.89 0.91 1.01   CALCIUM mg/dL 8.8 8.7 8.6   GLUCOSE mg/dL 97 93 95     Results from last 7 days   Lab Units 12/01/21  0629 11/30/21  0611 11/29/21  0625   WBC 10*3/mm3 4.57 4.45 4.21   HEMOGLOBIN g/dL 9.2* 9.3* 9.0*   HEMATOCRIT % 29.5* 29.0* 29.0*   PLATELETS 10*3/mm3 95* 119* 125*         Lab Results   Component Value Date    BLOODCX No growth at 5 days 11/18/2021     No results found for: URINECX    I reviewed the patient's new imaging including images and reports.  No recent films    All medications reviewed.   Pharmacy Consult, , Does not apply, BID  aspirin, 81 mg, Nasogastric, Daily  atorvastatin, 80 mg, Nasogastric, Nightly  chlorhexidine, 15 mL, Mouth/Throat, Q12H  famotidine, 20 mg, Nasogastric, Daily  midodrine, 15 mg, Nasogastric, Q8H  oxymetazoline, 2 spray, Each Nare, BID  sodium chloride, 10 mL, Intravenous, Q12H          Assessment/Plan       Acute renal failure superimposed on ESRD    Acute on chronic respiratory failure with hypoxia     Volume overload    S/P R BKA     COPD on 2L NC baseline     Idiopathic hypotension on midodrine     Hyperkalemia    Chronic HCV     Anticoagulated on Eliquis (h/o DVT/PE)     Former smoker    Right atrial mass    Tricuspid valve mass    Pancytopenia (HCC)      #1 end-stage renal disease on hemodialysis  through a right IJ tunneled access that was changed over a wire by IR because it was thrombosed.  He is currently on CRRT.  Unfortunately he is so hemodynamically unstable that they were unable to pull any volume.  Creatinine is 0.93, potassium 3.8, BUN 4.    #2 shock with profound hypotension on multiple pressors.  At baseline he has idiopathic hypotension and takes midodrine.  His echocardiogram revealed a clot on the tricuspid valve and in the right atrium but an EF of 46 to 50%.  He did require transfusion for nosebleed.      #3 respiratory failure, acute on chronic currently on mechanical ventilation, 100% FiO2 rate 16 tidal volume 400 and PEEP of 8.  Now his oxygen saturation has dropped to 64%.  End-tidal CO2 is measuring 18.  This is a very ominous sign and I think just multiorgan failure from his refractory shock and 3 pressors with vasoconstriction.        PLAN:    -He is declining both oxygenation and blood pressure despite maximal support.  We have reached the point of futility when we can no longer oxygenate him.  I asked nursing to hold CRRT and reinfused blood.  Given his severe hypotension I do not feel comfortable increasing his PEEP which would further decrease his venous return and perhaps lower his blood pressure.    -Palliative care team following along for goals of care discussion with family.  He is a no CPR, no escalation of care.        Isabela Renner MD  12/01/21  14:51 EST      Time: Critical care 30 min  I personally provided care to this critically ill patient as documented above.  Critical care time does not include time spent on separately billed procedures.  None of my critical care time was concurrent with other critical care providers.

## 2021-12-01 NOTE — PROGRESS NOTES
Palliative Care Progress Note    Date of Admission: 11/18/2021    Subjective: Patient remains on the ventilator and unresponsive.  Overnight events noted.  Current Code Status     Date Active Code Status Order ID Comments User Context       11/18/2021 1751 CPR (Attempt to Resuscitate) [id number]  Ellie Medina, [facility]     Advance Care Planning Activity      Questions for Current Code Status     Question Answer    Code Status (Patient has no pulse and is not breathing) CPR (Attempt to Resuscitate)    Medical Interventions (Patient has pulse or is breathing) Full Support        No current facility-administered medications on file prior to encounter.     Current Outpatient Medications on File Prior to Encounter   Medication Sig Dispense Refill   • albuterol sulfate  (90 Base) MCG/ACT inhaler Inhale 2 puffs Every 4 (Four) Hours As Needed for Wheezing.     • apixaban (ELIQUIS) 2.5 MG tablet tablet Take 2.5 mg by mouth 2 (Two) Times a Day.     • aspirin 81 MG chewable tablet Chew 1 tablet Daily. 30 tablet 0   • atorvastatin (LIPITOR) 80 MG tablet Take 1 tablet by mouth Every Night. 30 tablet 0   • Ca Carbonate-Mag Hydroxide 675-135 MG chewable tablet Chew 2 tablets As Needed (Heartburn).     • famotidine (PEPCID) 20 MG tablet Take 20 mg by mouth 2 (Two) Times a Day As Needed for Heartburn.     • HYDROcodone-acetaminophen (NORCO)  MG per tablet Take 1 tablet by mouth 4 (Four) Times a Day.     • midodrine (PROAMATINE) 5 MG tablet Take 3 tablets by mouth 3 (Three) Times a Day Before Meals. 270 tablet 0   • PHARMACY MEDS TO BED CONSULT Daily.     • pseudoephedrine (SUDAFED) 30 MG tablet Take 2 tablets by mouth Every 12 (Twelve) Hours. 120 tablet 0   • sevelamer (RENAGEL) 800 MG tablet Take  by mouth. Two tablets (1600 mg) by mouth TID with meals and 1 tablet (800 mg) by mouth daily with a snack       dextrose 5 % and sodium chloride 0.9 %, 100 mL/hr, Last Rate: 100 mL/hr (12/01/21 0511)  DOPamine, 2-20  "mcg/kg/min (Order-Specific), Last Rate: 14 mcg/kg/min (12/01/21 0256)  fentanyl 10 mcg/mL,  mcg/hr, Last Rate: 300 mcg/hr (12/01/21 0557)  Pharmacy Consult, , Last Rate: Stopped (11/29/21 1123)  midazolam, 1-10 mg/hr, Last Rate: 10 mg/hr (12/01/21 0808)  norepinephrine, 0.02-0.5 mcg/kg/min (Order-Specific), Last Rate: 0.32 mcg/kg/min (11/30/21 2344)  phenylephrine, 0.5-3 mcg/kg/min (Order-Specific), Last Rate: 3 mcg/kg/min (12/01/21 0809)  Phoxillum BK4/2.5, 1,000 mL/hr, Last Rate: 1,000 mL/hr (12/01/21 1248)  Phoxillum BK4/2.5, 1,000 mL/hr, Last Rate: 1,000 mL/hr (12/01/21 1248)  Phoxillum BK4/2.5, 1,000 mL/hr, Last Rate: 1,000 mL/hr (12/01/21 1248)  propofol, 5-50 mcg/kg/min, Last Rate: 50 mcg/kg/min (12/01/21 1108)      anticoagulant citrate (ACD) formula A  •  calcium gluconate IVPB **OR** calcium gluconate IVPB  •  dextrose  •  dextrose  •  glucagon (human recombinant)  •  potassium chloride  •  sodium chloride  •  sodium chloride    Objective: BP (!) 113/36 (BP Location: Left arm, Patient Position: Lying)   Pulse 65   Temp 94.1 °F (34.5 °C) (Rectal)   Resp 16   Ht 182.9 cm (72.01\")   Wt 69.3 kg (152 lb 12.5 oz)   SpO2 94%   BMI 20.72 kg/m²      Intake/Output Summary (Last 24 hours) at 12/1/2021 1308  Last data filed at 12/1/2021 1200  Gross per 24 hour   Intake 5330 ml   Output 5069 ml   Net 261 ml     Physical Exam:      General Appearance:   Intubated, unresponsive   Head:    Normocephalic, without obvious abnormality, atraumatic   Eyes:            Lids and lashes normal, conjunctivae and sclerae normal, no   icterus, no pallor, corneas clear, PERRLA   Ears:    Ears appear intact with no abnormalities noted   Throat:   No oral lesions, no thrush, oral mucosa moist   Neck:   No adenopathy, supple, trachea midline, no thyromegaly, no   carotid bruit, no JVD   Back:     No kyphosis present, no scoliosis present, no skin lesions,      erythema or scars, no tenderness to percussion or                 "   palpation,   range of motion normal   Lungs:     Clear to auscultation,respirations regular, even and                  unlabored    Heart:    Regular rhythm and normal rate, normal S1 and S2, no            murmur, no gallop, no rub, no click   Chest Wall:    No abnormalities observed   Abdomen:     Normal bowel sounds, no masses, no organomegaly, soft        non-tender, non-distended, no guarding, no rebound                tenderness   Rectal:     Deferred   Extremities:   +edema   Pulses:   Pulses palpable and equal bilaterally   Skin:   No bleeding, bruising or rash   Lymph nodes:   No palpable adenopathy         Results from last 7 days   Lab Units 12/01/21  0629   WBC 10*3/mm3 4.57   HEMOGLOBIN g/dL 9.2*   HEMATOCRIT % 29.5*   PLATELETS 10*3/mm3 95*     Results from last 7 days   Lab Units 12/01/21  0629   SODIUM mmol/L 136   POTASSIUM mmol/L 3.8   CHLORIDE mmol/L 103   CO2 mmol/L 22.0   BUN mg/dL 4*   CREATININE mg/dL 0.91   CALCIUM mg/dL 8.7   GLUCOSE mg/dL 93       Impression: End-stage renal disease on dialysis  Respiratory failure  Change in mental status  Debility/weakness  Sepsis  Goals of care  Plan: Talked briefly to the patient's mother again over the phone and discussed the fact that the patient is on day 12 of being on the ventilator.  Mother asked if we can call tomorrow, when the patient's Sister Lyric will be in town.        Kj Ordonez DO  12/01/21  13:08 EST

## 2021-12-01 NOTE — CASE MANAGEMENT/SOCIAL WORK
Continued Stay Note  [facility]     Patient Name: Lenin Ruelas  MRN: [id number]  Today's Date: 12/1/2021    Admit Date: 11/18/2021     Discharge Plan     Row Name 12/01/21 1236       Plan    Plan TBD    Plan Comments Patient sedated on vent and CRRT.  Remains on pressors.  Palliative following.  Discharge plan to be determined.  CM will continue to follow.               Discharge Codes    No documentation.               Expected Discharge Date and Time     Expected Discharge Date Expected Discharge Time    Dec 5, 2021             Priti Fisher RN

## 2021-12-01 NOTE — PLAN OF CARE
Goal Outcome Evaluation:  Plan of Care Reviewed With: patient        Progress: declining  Outcome Summary: CRRT stopped @1456. Pts O2 sat dropped as low as 52% despite, suction, reposition and increased FiO2 to 100%. Blood returned, per Dr Renner, improvement in BP and O2. Pts sister Lyric updated on change in status. Nephrology paged, awaiting return call. Remains sedated on vent, 3 pressors, will wean as amando.

## 2021-12-01 NOTE — PLAN OF CARE
Goal Outcome Evaluation:           Progress: no change  Outcome Summary: CRRT continues; filter changed x1 with blood returned. Drips unchanged through the night. Minimal bleeding from nostrils and during oral care.   07-Mar-2019 19:45

## 2021-12-01 NOTE — NURSING NOTE
CRRT rounds complete. Policy and procedure reviewed with VINH Paredes. Orders and documentation reviewed. Hematocrit updated. Supplies adequate. Denies further need at this time. Encouraged to call [phone] for assistance or for questions.

## 2021-12-01 NOTE — PROGRESS NOTES
Clinical Nutrition     Nutrition Support Assessment  Reason for Visit:   Follow-up protocol, NPO/Clear liquid x7 days      Patient Name: Lenin Ruelas  YOB: 1975  MRN: [id number]  Date of Encounter: 12/01/21 11:39 EST  Admission date: 11/18/2021        Nutrition Assessment   Assessment   A/c respiratory failure  Vent (11/19)  LUISITO/CKD  Volume overload  Hypotension  CRRT started (11/19) --> stopped (11/22) --> restarted (11/23)  R. atrial mass/tricuspid mass      PMH: He  has a past medical history of DVT (deep venous thrombosis) (HCC), ESRD (end stage renal disease) on dialysis (HCC), Hepatitis C, Nephrosclerosis, Perianal abscess, Pulmonary embolism (HCC), Renal disorder, and Substance abuse (HCC).   PSxH: He  has a past surgical history that includes Revision / takedown colostomy; Colostomy; AV fistula placement; and Below knee leg amputation (Right).     Applicable nutrition-related information:  (11/18) NPO     Reported/Observed/Food/Nutrition Related History:     Pt intubated, sedated,  remains on CRRT, + fentanyl, versed,  propofol 19.1ml, dopamine 14mcg, levophed 0.32mcg, ines 3mcg, D5NS@100ml/hr    Anthropometrics   Height: 72in  Wt: 126lb bedscale  BMI: n/a 2nd BKA    Date Weight (kg) Weight (lbs) Weight Method   11/23/2021 66.4 kg 146 lb 6.2 oz -   11/21/2021 63.6 kg 140 lb 3.4 oz Bed scale   11/20/2021 61.5 kg 135 lb 9.3 oz Bed scale   11/19/2021 57.153 kg 126 lb -   11/18/2021 57.6 kg 126 lb 15.8 oz Bed scale   11/18/2021 63.05 kg 139 lb Estimated   10/28/2021 63.05 kg  139 lb  Bed scale   10/26/2021 64.91 kg 143 lb 1.6 oz Bed scale   10/25/2021 68.765 kg 151 lb 9.6 oz -   10/21/2021 66.7 kg 147 lb 0.8 oz -   10/20/2021 66.7 kg 147 lb 0.8 oz Bed scale   10/20/2021 81.647 kg 180 lb Stated     Labs reviewed     Results from last 7 days   Lab Units 12/01/21  0629 11/30/21  2206 11/30/21  1404   GLUCOSE mg/dL 93 95 89   BUN mg/dL 4* 4* 4*   CREATININE mg/dL 0.91 1.01 0.93    SODIUM mmol/L 136 136 136   CHLORIDE mmol/L 103 103 104   POTASSIUM mmol/L 3.8 4.0 3.8   PHOSPHORUS mg/dL 3.8 3.9 3.9   MAGNESIUM mg/dL 2.5 2.3 2.2     Results from last 7 days   Lab Units 12/01/21  0635 12/01/21  0629 11/30/21  2206 11/30/21  1404   ALBUMIN g/dL  --  3.20* 3.10* 3.10*   IONIZED CALCIUM mmol/L 1.27  --  1.26 1.28           No results found for: HGBA1C    Medications reviewed   Scheduled: pepcid renvela  GTT: D5NS at 100 ml/hr, fentanyl, versed, propofol, levophed, ines, dopamine    Needs Assessment 11/23   Height used: 72 in/183 cm  Weight used: 126 lb/58 kg    Estimated calorie needs: ~1650 kcal/day  PSU (Ve= 6.5, Tmax= 37.3)= 1648 kcal  25-30 kcal/kg= 0567-6963 kcal    Estimated protein needs: ~116 g pro/day -- for CRRT  1.2-1.5 g/kg= 69-87 g pro -- off CRRT  2.0 g/kg= 116 g pro -- for CRRT    Current Nutrition Prescription     PO: NPO Diet > 12 days  No active supplement orders    Nutrition Diagnosis     11-19-21, 12-1  Problem Inadequate energy intake   Etiology Per Clinical Status: ARF/VENT/ Pressors   Signs/Symptoms NPO > 12 days   Status: ongoing      Nutrition Intervention    Follow treatment progress, Care plan reviewed     Pt NPO 13 days, critically ill on 3 pressors    Consider PN, if decide to continue with agressive support    -PN recs provided:  D14%, AA8.0% at 60 ml/hr via central access    -Provides at goal:  1440 ml  1145kcal  115 g protein    Propofol @19.1ml, will provide 504kcal    D5NS@100ml/hr provides 408kcal    Goal:   General: Nutrition support treatment    Establish GOC    Monitoring/Evaluation:   Per protocol, I&O, Pertinent labs, Weight, Symptoms, POC/GOC, Hemodynamic stability    Mirtha Mukherjee, MARIO  Time Spent:30 minutes

## 2021-12-01 NOTE — PROGRESS NOTES
"   LOS: 13 days    Patient Care Team:  Provider, No Known as PCP - General    Reason For Visit:  F/U ESRD. SEEN ON CRRT  Subjective           Review of Systems: UNOBTAINABLE      Objective     Pharmacy Consult, , Does not apply, BID  aspirin, 81 mg, Nasogastric, Daily  atorvastatin, 80 mg, Nasogastric, Nightly  chlorhexidine, 15 mL, Mouth/Throat, Q12H  famotidine, 20 mg, Nasogastric, Daily  midodrine, 15 mg, Nasogastric, Q8H  oxymetazoline, 2 spray, Each Nare, BID  sevelamer, 1,600 mg, Nasogastric, TID With Meals  sodium chloride, 10 mL, Intravenous, Q12H      dextrose 5 % and sodium chloride 0.9 %, 100 mL/hr, Last Rate: 100 mL/hr (12/01/21 0511)  DOPamine, 2-20 mcg/kg/min (Order-Specific), Last Rate: 14 mcg/kg/min (12/01/21 0256)  fentanyl 10 mcg/mL,  mcg/hr, Last Rate: 300 mcg/hr (12/01/21 0557)  Pharmacy Consult, , Last Rate: Stopped (11/29/21 1123)  midazolam, 1-10 mg/hr, Last Rate: 10 mg/hr (12/01/21 0808)  norepinephrine, 0.02-0.5 mcg/kg/min (Order-Specific), Last Rate: 0.32 mcg/kg/min (11/30/21 2344)  phenylephrine, 0.5-3 mcg/kg/min (Order-Specific), Last Rate: 3 mcg/kg/min (12/01/21 0809)  Phoxillum BK4/2.5, 1,000 mL/hr, Last Rate: 1,000 mL/hr (12/01/21 0754)  Phoxillum BK4/2.5, 1,000 mL/hr, Last Rate: 1,000 mL/hr (12/01/21 0754)  Phoxillum BK4/2.5, 1,000 mL/hr, Last Rate: 1,000 mL/hr (12/01/21 0754)  propofol, 5-50 mcg/kg/min, Last Rate: 50 mcg/kg/min (12/01/21 0557)          Vital Signs:  Blood pressure (!) 57/21, pulse 63, temperature 93.4 °F (34.1 °C), temperature source Rectal, resp. rate 16, height 182.9 cm (72.01\"), weight 69.3 kg (152 lb 12.5 oz), SpO2 90 %.    Flowsheet Rows      First Filed Value   Admission Height 182.9 cm (72\") Documented at 11/18/2021 1239   Admission Weight 63 kg (139 lb) Documented at 11/18/2021 1239          11/30 0701 - 12/01 0700  In: 5535 [I.V.:5335]  Out: 9509     Physical Exam:    General Appearance: INTUBATED. UNRESPONSIVE. THD CATH  Eyes: PER, conjunctivae and " sclerae normal, no icterus  Lungs: ON THE VENT, no crepitus, RHONCHI  Heart/CV: regular rhythm & normal rate, no murmur, no gallop, no rub and 1+ edema  Abdomen: not distended, soft, non-tender, no masses,  bowel sounds present  Skin: No rash, Warm and dry    Radiology:            Labs:  Results from last 7 days   Lab Units 12/01/21  0629 11/30/21  0611 11/29/21  0625   WBC 10*3/mm3 4.57 4.45 4.21   HEMOGLOBIN g/dL 9.2* 9.3* 9.0*   HEMATOCRIT % 29.5* 29.0* 29.0*   PLATELETS 10*3/mm3 95* 119* 125*     Results from last 7 days   Lab Units 12/01/21  0629 11/30/21  2206 11/30/21  1404 11/30/21  0611   SODIUM mmol/L 136 136 136 138   POTASSIUM mmol/L 3.8 4.0 3.8 4.0   CHLORIDE mmol/L 103 103 104 104   CO2 mmol/L 22.0 23.0 23.0 22.0   BUN mg/dL 4* 4* 4* 4*   CREATININE mg/dL 0.91 1.01 0.93 1.11   CALCIUM mg/dL 8.7 8.6 8.8 8.8   PHOSPHORUS mg/dL 3.8 3.9 3.9 4.0   MAGNESIUM mg/dL 2.5 2.3 2.2 2.7*   ALBUMIN g/dL 3.20* 3.10* 3.10* 3.50     Results from last 7 days   Lab Units 12/01/21  0629   GLUCOSE mg/dL 93                       Estimated Creatinine Clearance: 97.3 mL/min (by C-G formula based on SCr of 0.91 mg/dL).      Assessment       Acute renal failure superimposed on ESRD    Acute on chronic respiratory failure with hypoxia     Volume overload    S/P R BKA     COPD on 2L NC baseline     Idiopathic hypotension on midodrine     Hyperkalemia    Chronic HCV     Anticoagulated on Eliquis (h/o DVT/PE)     Former smoker    Right atrial mass    Tricuspid valve mass    Pancytopenia (HCC)            Impression: ESRD. HYPOTENSION ON PRESSORS. ANEMIA.            Recommendations: CONTINUE CVVHDF.      Asaf Monroy MD  12/01/21  09:51 EST

## 2021-12-01 NOTE — PROGRESS NOTES
"Palliative Care Daily Progress Note     ESRD (end stage renal disease) on dialysis ([facility]) [N18.6, Z99.2]    CC:  Intubated, unresponsive. HX per RN and chart    S: Medical record reviewed, followed up with mother (by phone) regarding patient's condition. Events noted.    47 yo M with ESRD.  He remains on max 3 pressors (BP 70-80/20-30),  unresponsive, vent.  CVVHD discontinued because no longer tolerating from BP and unable to pull volume/no benefit.  Made DNR Sat due to futile care (per documentation sounds like [facility] decision, not family, but I did not confirm with pulmonary or mom).       PHONE CALL to mother (Che) re. dismal prognosis: he will die on machines, he is not stable enough for trach, no improvement despite maximum treatment.  She said she DID NOT want to visit for EOL visit to see him on machines.       O:   Palliative Performance Scale Score: 10%   BP (!) 101/32   Pulse 68   Temp 94.1 °F (34.5 °C) (Rectal)   Resp 16   Ht 182.9 cm (72.01\")   Wt 69.3 kg (152 lb 12.5 oz)   SpO2 94%   BMI 20.72 kg/m²     Intake/Output Summary (Last 24 hours) at 12/1/2021 1511  Last data filed at 12/1/2021 1500  Gross per 24 hour   Intake 5347 ml   Output 5288 ml   Net 59 ml       Physical Exam:      General Appearance:   intubated, unresponsive to touch or noxious stimuli, warming blanket     HEENT: ETT, facial swelling, gaze fixed, mouth moist with oral care, no blink               Neck:  No adenopathy, supple, trachea midline, no thyromegaly,  no JVD       Lungs:    Clear to auscultation,respirations regular, even and                  Unlabored on vent settings    Heart:   Regular rhythm and normal rate   Breast Exam:   Deferred   Abdomen:    absent bowel sounds, no masses, no grimace to abd palpation   Genitalia:   Deferred   Extremities: Generalized anasarca, no movement of extremities, no mottling                   Neurologic:  No spontaneous movt, unresponsive to noxious stimuli             Meds: Reviewed "   Current Facility-Administered Medications   Medication Dose Route Frequency Provider Last Rate Last Admin   • !Patient has home medications stored in central pharmacy. Please call [phone] to retrieve prior to discharge   Does not apply BID Ricardo Paz RPH       • anticoagulant citrate (ACD) formula A (ACD FORMULA A) solution 0-2 mL  0-2 mL Intravenous PRN Monique Lane MD   1.6 mL at 11/22/21 2132   • aspirin chewable tablet 81 mg  81 mg Nasogastric Daily Solo Arroyo MD   81 mg at 12/01/21 0957   • atorvastatin (LIPITOR) tablet 80 mg  80 mg Nasogastric Nightly Solo Arroyo MD   80 mg at 11/30/21 2139   • calcium gluconate 1 g in sodium chloride 0.9 % 50 mL IVPB  1 g Intravenous PRN Monique Lane MD        Or   • calcium gluconate 2 g in sodium chloride 0.9 % 50 mL IVPB  2 g Intravenous PRN Monique Lane MD       • chlorhexidine (PERIDEX) 0.12 % solution 15 mL  15 mL Mouth/Throat Q12H Natalie Hill APRN   15 mL at 12/01/21 0957   • dextrose (D50W) (25 g/50 mL) IV injection 25 g  25 g Intravenous Q15 Min PRN Solo Arroyo MD   25 g at 11/26/21 0751   • dextrose (GLUTOSE) oral gel 15 g  15 g Oral Q15 Min PRN Solo Arroyo MD       • dextrose 5 % and sodium chloride 0.9 % infusion  100 mL/hr Intravenous Continuous Cal Wallace APRN 100 mL/hr at 12/01/21 0511 100 mL/hr at 12/01/21 0511   • DOPamine (INTROPIN) 800 mg in sodium chloride 0.9 % 250 mL infusion **DOUBLE CONCENTRATION**  2-20 mcg/kg/min (Order-Specific) Intravenous Titrated Cal Wallace APRN 16.7 mL/hr at 12/01/21 0256 14 mcg/kg/min at 12/01/21 0256   • famotidine (PEPCID) tablet 20 mg  20 mg Nasogastric Daily Solo Arroyo MD   20 mg at 12/01/21 0957   • fentaNYL 2500 mcg/250 mL NS infusion   mcg/hr Intravenous Titrated Case, Merry V., DO 30 mL/hr at 12/01/21 1337 300 mcg/hr at 12/01/21 1337   • glucagon (human recombinant) (GLUCAGEN DIAGNOSTIC) injection 1 mg  1 mg Subcutaneous Q15 Min  PRN Solo Arroyo MD       • Heparin on Hold for Nosebleeds   Does not apply Continuous Isabela Rnener MD   Held at 11/29/21 1123   • midazolam (VERSED) 100 mg in 100mL NS infusion  1-10 mg/hr Intravenous Titrated Case, Merry V., DO 10 mL/hr at 12/01/21 0808 10 mg/hr at 12/01/21 0808   • midodrine (PROAMATINE) tablet 15 mg  15 mg Nasogastric Q8H Case, Merry V., DO   15 mg at 12/01/21 1355   • norepinephrine (LEVOPHED) 16,000 mcg in sodium chloride 0.9 % 250 mL infusion  0.02-0.5 mcg/kg/min (Order-Specific) Intravenous Titrated Cal Wallace APRN 19.08 mL/hr at 12/01/21 1336 0.32 mcg/kg/min at 12/01/21 1336   • oxymetazoline (AFRIN) nasal spray 2 spray  2 spray Each Nare BID Isabela Renner MD   2 spray at 12/01/21 0957   • phenylephrine (PAKO-SYNEPHRINE) 100 mg in sodium chloride 0.9 % 250 mL infusion **DOUBLE CONCENTRATION**  0.5-3 mcg/kg/min (Order-Specific) Intravenous Titrated Case, Merry V., DO 28.6 mL/hr at 12/01/21 0809 3 mcg/kg/min at 12/01/21 0809   • Phoxillum BK4/2.5 dialysis solution  1,000 mL/hr CRRT Continuous Monique Lane MD 1,000 mL/hr at 12/01/21 1248 1,000 mL/hr at 12/01/21 1248   • Phoxillum BK4/2.5 dialysis solution  1,000 mL/hr CRRT Continuous Monique Lane MD 1,000 mL/hr at 12/01/21 1248 1,000 mL/hr at 12/01/21 1248   • Phoxillum BK4/2.5 dialysis solution  1,000 mL/hr CRRT Continuous Monique Lane MD 1,000 mL/hr at 12/01/21 1248 1,000 mL/hr at 12/01/21 1248   • potassium chloride 20 mEq in 50 mL IVPB  20 mEq Intravenous PRN DomingoMonique lau MD       • propofol (DIPRIVAN) infusion 10 mg/mL 100 mL  5-50 mcg/kg/min Intravenous Titrated Case, Merry V., DO 19.08 mL/hr at 12/01/21 1108 50 mcg/kg/min at 12/01/21 1108   • sodium chloride 0.9 % flush 10 mL  10 mL Intravenous Q12H Natalie Hill APRN   10 mL at 12/01/21 1108   • sodium chloride 0.9 % flush 10 mL  10 mL Intravenous PRN Natalie Hill APRN       • sodium chloride 0.9 % flush 20  mL  20 mL Intravenous PRN Natalie Hill APRN          dextrose 5 % and sodium chloride 0.9 %, 100 mL/hr, Last Rate: 100 mL/hr (12/01/21 0511)  DOPamine, 2-20 mcg/kg/min (Order-Specific), Last Rate: 14 mcg/kg/min (12/01/21 0256)  fentanyl 10 mcg/mL,  mcg/hr, Last Rate: 300 mcg/hr (12/01/21 1337)  Pharmacy Consult, , Last Rate: Stopped (11/29/21 1123)  midazolam, 1-10 mg/hr, Last Rate: 10 mg/hr (12/01/21 0808)  norepinephrine, 0.02-0.5 mcg/kg/min (Order-Specific), Last Rate: 0.32 mcg/kg/min (12/01/21 1336)  phenylephrine, 0.5-3 mcg/kg/min (Order-Specific), Last Rate: 3 mcg/kg/min (12/01/21 0809)  Phoxillum BK4/2.5, 1,000 mL/hr, Last Rate: 1,000 mL/hr (12/01/21 1248)  Phoxillum BK4/2.5, 1,000 mL/hr, Last Rate: 1,000 mL/hr (12/01/21 1248)  Phoxillum BK4/2.5, 1,000 mL/hr, Last Rate: 1,000 mL/hr (12/01/21 1248)  propofol, 5-50 mcg/kg/min, Last Rate: 50 mcg/kg/min (12/01/21 1108)           Labs:   Results from last 7 days   Lab Units 12/01/21  1352 12/01/21  0629 12/01/21  0629   WBC 10*3/mm3  --   --  4.57   HEMOGLOBIN g/dL  --   --  9.2*   HEMATOCRIT %  --   --  29.5*   PLATELETS 10*3/mm3  --   --  95*   SODIUM mmol/L 136   < > 136   POTASSIUM mmol/L 4.0   < > 3.8   CHLORIDE mmol/L 105   < > 103   CO2 mmol/L 23.0   < > 22.0   BUN mg/dL 4*   < > 4*   CREATININE mg/dL 0.89   < > 0.91   CALCIUM mg/dL 8.8   < > 8.7   GLUCOSE mg/dL 97   < > 93    < > = values in this interval not displayed.     Imaging Results (Last 72 Hours)     ** No results found for the last 72 hours. **              Diagnostics: Reviewed    Assessment/Plan:  1. Multisystem failure - NO improvement despite weeks of full support.  Recommend discuss with primary team re. no escalation of pressors or possible decrease due to futility of care.  Mother is not going to make decision to withdraw support.  She does not want to visit him on machines.     2. GOC - futility of ongoing aggressive care.  Advised mother that patient's siblings can visit.  No  escalation of care due to futility.        We will continue to follow along. Please do not hesitate to contact us regarding further sx mgmt or GOC needs, including after hours or on weekends via our on call provider at [phone].     Lizzette Caban MD    12/1/2021

## 2021-12-02 NOTE — PROGRESS NOTES
"   LOS: 14 days    Patient Care Team:  Provider, No Known as PCP - General    Chief Complaint:  SOA    Subjective     Interval History:     Continues to be on vent and pressor. BP low on max pressor support. CRRT stopped because drop in sat and BP  Review of Systems:    Review of systems could not be obtained due to  patient sedation status. patient intubated.    Objective     Vital Sign Min/Max for last 24 hours  Temp  Min: 96.3 °F (35.7 °C)  Max: 99.5 °F (37.5 °C)   BP  Min: 53/23  Max: 107/63   Pulse  Min: 69  Max: 85   Resp  Min: 16  Max: 17   SpO2  Min: 61 %  Max: 95 %   No data recorded   No data recorded     Flowsheet Rows      First Filed Value   Admission Height 182.9 cm (72\") Documented at 11/18/2021 1239   Admission Weight 63 kg (139 lb) Documented at 11/18/2021 1239          I/O this shift:  In: 1603 [I.V.:1603]  Out: -   I/O last 3 completed shifts:  In: 7574.3 [I.V.:7434.3; Other:140]  Out: 4424 [Other:4424]    Physical Exam:  Gen: intubated and sedated.    HENT: NC, AT  NECK: Supple, ETT in place  LUNGS: Coarse breath sound on vent, non labored respirtation   CVS: S1/S2 audible, RRR, no murmur   Abd: Soft, NT, ND, BS+   Ext: Trace edema, no cyanosis   CNS: Intubated and sedated.   Psy: Intubated and sedated.   Skin: Warm, dry and intact      WBC WBC   Date Value Ref Range Status   12/02/2021 12.50 (H) 3.40 - 10.80 10*3/mm3 Final   12/01/2021 4.57 3.40 - 10.80 10*3/mm3 Final   11/30/2021 4.45 3.40 - 10.80 10*3/mm3 Final      HGB Hemoglobin   Date Value Ref Range Status   12/02/2021 8.5 (L) 13.0 - 17.7 g/dL Final   12/01/2021 9.2 (L) 13.0 - 17.7 g/dL Final   11/30/2021 9.3 (L) 13.0 - 17.7 g/dL Final      HCT Hematocrit   Date Value Ref Range Status   12/02/2021 28.0 (L) 37.5 - 51.0 % Final   12/01/2021 29.5 (L) 37.5 - 51.0 % Final   11/30/2021 29.0 (L) 37.5 - 51.0 % Final      Platlets No results found for: LABPLAT   MCV MCV   Date Value Ref Range Status   12/02/2021 94.0 79.0 - 97.0 fL Final "   12/01/2021 92.2 79.0 - 97.0 fL Final   11/30/2021 89.8 79.0 - 97.0 fL Final          Sodium Sodium   Date Value Ref Range Status   12/02/2021 136 136 - 145 mmol/L Final   12/01/2021 136 136 - 145 mmol/L Final   12/01/2021 136 136 - 145 mmol/L Final   11/30/2021 136 136 - 145 mmol/L Final   11/30/2021 136 136 - 145 mmol/L Final   11/30/2021 138 136 - 145 mmol/L Final   11/29/2021 137 136 - 145 mmol/L Final      Potassium Potassium   Date Value Ref Range Status   12/02/2021 4.2 3.5 - 5.2 mmol/L Final   12/01/2021 4.0 3.5 - 5.2 mmol/L Final   12/01/2021 3.8 3.5 - 5.2 mmol/L Final   11/30/2021 4.0 3.5 - 5.2 mmol/L Final     Comment:     Slight hemolysis detected by analyzer. Results may be affected.   11/30/2021 3.8 3.5 - 5.2 mmol/L Final     Comment:     Slight hemolysis detected by analyzer. Results may be affected.   11/30/2021 4.0 3.5 - 5.2 mmol/L Final     Comment:     Slight hemolysis detected by analyzer. Results may be affected.   11/29/2021 4.0 3.5 - 5.2 mmol/L Final     Comment:     Slight hemolysis detected by analyzer. Results may be affected.      Chloride Chloride   Date Value Ref Range Status   12/02/2021 106 98 - 107 mmol/L Final   12/01/2021 105 98 - 107 mmol/L Final   12/01/2021 103 98 - 107 mmol/L Final   11/30/2021 103 98 - 107 mmol/L Final   11/30/2021 104 98 - 107 mmol/L Final   11/30/2021 104 98 - 107 mmol/L Final   11/29/2021 103 98 - 107 mmol/L Final      CO2 CO2   Date Value Ref Range Status   12/02/2021 21.0 (L) 22.0 - 29.0 mmol/L Final   12/01/2021 23.0 22.0 - 29.0 mmol/L Final   12/01/2021 22.0 22.0 - 29.0 mmol/L Final   11/30/2021 23.0 22.0 - 29.0 mmol/L Final   11/30/2021 23.0 22.0 - 29.0 mmol/L Final   11/30/2021 22.0 22.0 - 29.0 mmol/L Final   11/29/2021 23.0 22.0 - 29.0 mmol/L Final      BUN BUN   Date Value Ref Range Status   12/02/2021 7 6 - 20 mg/dL Final   12/01/2021 4 (L) 6 - 20 mg/dL Final   12/01/2021 4 (L) 6 - 20 mg/dL Final   11/30/2021 4 (L) 6 - 20 mg/dL Final   11/30/2021 4  (L) 6 - 20 mg/dL Final   11/30/2021 4 (L) 6 - 20 mg/dL Final   11/29/2021 4 (L) 6 - 20 mg/dL Final      Creatinine Creatinine   Date Value Ref Range Status   12/02/2021 1.77 (H) 0.76 - 1.27 mg/dL Final   12/01/2021 0.89 0.76 - 1.27 mg/dL Final   12/01/2021 0.91 0.76 - 1.27 mg/dL Final   11/30/2021 1.01 0.76 - 1.27 mg/dL Final   11/30/2021 0.93 0.76 - 1.27 mg/dL Final   11/30/2021 1.11 0.76 - 1.27 mg/dL Final   11/29/2021 0.99 0.76 - 1.27 mg/dL Final      Calcium Calcium   Date Value Ref Range Status   12/02/2021 8.0 (L) 8.6 - 10.5 mg/dL Final   12/01/2021 8.8 8.6 - 10.5 mg/dL Final   12/01/2021 8.7 8.6 - 10.5 mg/dL Final   11/30/2021 8.6 8.6 - 10.5 mg/dL Final   11/30/2021 8.8 8.6 - 10.5 mg/dL Final   11/30/2021 8.8 8.6 - 10.5 mg/dL Final   11/29/2021 8.7 8.6 - 10.5 mg/dL Final      PO4 No results found for: CAPO4   Albumin Albumin   Date Value Ref Range Status   12/02/2021 2.70 (L) 3.50 - 5.20 g/dL Final   12/01/2021 3.10 (L) 3.50 - 5.20 g/dL Final   12/01/2021 3.20 (L) 3.50 - 5.20 g/dL Final   11/30/2021 3.10 (L) 3.50 - 5.20 g/dL Final   11/30/2021 3.10 (L) 3.50 - 5.20 g/dL Final   11/30/2021 3.50 3.50 - 5.20 g/dL Final   11/29/2021 3.20 (L) 3.50 - 5.20 g/dL Final      Magnesium Magnesium   Date Value Ref Range Status   12/02/2021 2.4 1.6 - 2.6 mg/dL Final   12/01/2021 2.3 1.6 - 2.6 mg/dL Final   12/01/2021 2.5 1.6 - 2.6 mg/dL Final   11/30/2021 2.3 1.6 - 2.6 mg/dL Final   11/30/2021 2.2 1.6 - 2.6 mg/dL Final   11/30/2021 2.7 (H) 1.6 - 2.6 mg/dL Final   11/29/2021 2.3 1.6 - 2.6 mg/dL Final      Uric Acid No results found for: URICACID        Results Review:     I reviewed the patient's new clinical results.    Pharmacy Consult, , Does not apply, BID  aspirin, 81 mg, Nasogastric, Daily  atorvastatin, 80 mg, Nasogastric, Nightly  chlorhexidine, 15 mL, Mouth/Throat, Q12H  famotidine, 20 mg, Nasogastric, Daily  midodrine, 15 mg, Nasogastric, Q8H  oxymetazoline, 2 spray, Each Nare, BID  sodium chloride, 10 mL,  Intravenous, Q12H      dextrose 5 % and sodium chloride 0.9 %, 100 mL/hr, Last Rate: 100 mL/hr (12/02/21 1236)  DOPamine, 2-20 mcg/kg/min (Order-Specific), Last Rate: 14 mcg/kg/min (12/02/21 0811)  fentanyl 10 mcg/mL,  mcg/hr, Last Rate: 300 mcg/hr (12/02/21 0621)  Pharmacy Consult, , Last Rate: Stopped (11/29/21 1123)  midazolam, 1-10 mg/hr, Last Rate: 10 mg/hr (12/02/21 1423)  norepinephrine, 0.02-0.5 mcg/kg/min (Order-Specific), Last Rate: 0.32 mcg/kg/min (12/02/21 0402)  phenylephrine, 0.5-3 mcg/kg/min (Order-Specific), Last Rate: 3 mcg/kg/min (12/02/21 1031)  propofol, 5-50 mcg/kg/min, Last Rate: 50 mcg/kg/min (12/02/21 1214)        Medication Review:     Assessment/Plan       Acute renal failure superimposed on ESRD    Acute on chronic respiratory failure with hypoxia     Volume overload    S/P R BKA     COPD on 2L NC baseline     Idiopathic hypotension on midodrine     Hyperkalemia    Chronic HCV     Anticoagulated on Eliquis (h/o DVT/PE)     Former smoker    Right atrial mass    Tricuspid valve mass    Pancytopenia (HCC)      1- ESRD - [facility] - nephrology - [facility] +  2- Hypotension - on Pressor support. Not improving.   3- COPD   4- Chest pain   5- hyperkalemia - resolved.   6- S/p right BKA   7- Anemia     Plan:  - Continue to hold CRRT. BP is too low with 3 pressor support to continue CRRT.   - Family meeting to set goals of treatment.       Critically ill patient. Guarded to poor prognosis  I discussed the patients findings and my recommendations with patient and nursing staff    Monique Lane MD  12/02/21  15:00 EST

## 2021-12-02 NOTE — PLAN OF CARE
Goal Outcome Evaluation:  Plan of Care Reviewed With: other (see comments) (Dr. Ordonez available to meet with family when they arrive today)        Progress: no change  Outcome Summary: Pt continues on vent and pressor support, no improvement on assessment. Family expected to visit today, Dr. Ordonez plans to meet with them regarding status and GOC.     Pt seen by Dr. Ordonez today.    1300, Palliative team meeting: JARON Silva RN, CHPN; RAMSES Mock RN, CHPN; BRIA Infante; TOMAS Calabrese RN, CHPN; SRIDEVI Ordonez, ; RAMSES Hwang, APRN; DADA Goldberg, RN

## 2021-12-02 NOTE — PROGRESS NOTES
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  He presented to the emergency room with worsening renal failure after missing hemodialysis.  He had significant hyperkalemia and was hypotensive with systolic blood pressure 68-73.  Bedside access could not be achieved because of his chronically occluded vessels.  He did go to IR and ultimately they were able to place an access and hemodialysis was initiated.  However he did require intubation mechanical ventilation initially.  Blood pressure worsened despite empiric broad-spectrum antibiotics.  He was placed on dopamine, norepinephrine, vasopressin and phenylephrine.  Echocardiogram revealed a 1.3 cm mass on his tricuspid valve and an additional mass affixed to his right atrium with reduced systolic function.  Cardiology felt they were consistent with blood clots.  Zach was transition to a heparin drip.  Unfortunately he developed a serious nosebleed requiring transfusion and nasal packing.  Heparin was held and then restarted and bleeding recurred and now he is off anticoagulation.  Despite 3 pressors he became hypotensive 2 days ago and CRRT had to be disconnected.  He also desaturated and despite 100% oxygen, did not increase above 90% despite 100% FiO2.      Interval History:     Continues to do poorly.  Oxygen saturations in the mid 80s on 100%.  Continues to require 3 pressors with marginal blood pressure, anywhere from 65-85 systolic.  Unable to resume dialysis because of  "hemodynamic instability.    Review of Systems:    All systems were reviewed and negative except as noted in subjective.    Medical history, surgical history, social history, family history reviewed    Objective     Intake/Output:    Intake/Output Summary (Last 24 hours) at 12/2/2021 1706  Last data filed at 12/2/2021 1214  Gross per 24 hour   Intake 4494.3 ml   Output --   Net 4494.3 ml       Nutrition:  NPO Diet    Infusions:  dextrose 5 % and sodium chloride 0.9 %, 100 mL/hr, Last Rate: 100 mL/hr (12/02/21 1236)  DOPamine, 2-20 mcg/kg/min (Order-Specific), Last Rate: 14 mcg/kg/min (12/02/21 0811)  fentanyl 10 mcg/mL,  mcg/hr, Last Rate: 300 mcg/hr (12/02/21 0621)  Pharmacy Consult, , Last Rate: Stopped (11/29/21 1123)  midazolam, 1-10 mg/hr, Last Rate: 10 mg/hr (12/02/21 1423)  norepinephrine, 0.02-0.5 mcg/kg/min (Order-Specific), Last Rate: 0.32 mcg/kg/min (12/02/21 0402)  phenylephrine, 0.5-3 mcg/kg/min (Order-Specific), Last Rate: 3 mcg/kg/min (12/02/21 1031)  propofol, 5-50 mcg/kg/min, Last Rate: 50 mcg/kg/min (12/02/21 1547)        Mechanical Ventilator Settings:            Resp Rate (Set): 16     FiO2 (%): 100 %  PEEP/CPAP (cm H2O): 8 cm H20    Minute Ventilation (L/min) (Obs): 6.65 L/min  Resp Rate (Observed) Vent: 16  I:E Ratio (Set): 1:0.40  I:E Ratio (Obs): 1:2.50    PIP Observed (cm H2O): 30 cm H2O  Plateau Pressure (cm H2O): 29 cm H2O    Telemetry: Sinus rhythm             Vital Signs  Blood pressure (!) 83/32, pulse 76, temperature 98.8 °F (37.1 °C), temperature source Bladder, resp. rate 16, height 182.9 cm (72.01\"), weight 69.3 kg (152 lb 12.5 oz), SpO2 (!) 83 %.    Physical Exam:  General Appearance:   Middle-aged gentleman sedated, intubated   Head:   Atraumatic   Eyes:          Conjunctiva pale   Ears:     Throat:  Orally intubated.  No further epistaxis   Neck:  Right hemodialysis access   Back:      Lungs:    Breath sounds are bilateral, coarse rhonchi, worse    Heart:   Regular, S1, S2 " auscultated   Abdomen:    Absent bowel sounds, slightly distended   Rectal:   Deferred   Extremities:  Left femoral deep line, generalized edema   Pulses:    Skin:  Cool and dry   Lymph nodes:    Neurologic:  Sedated      Results Review:     I reviewed the patient's new clinical results.   Results from last 7 days   Lab Units 12/02/21  0617 12/01/21  1352 12/01/21  0629   SODIUM mmol/L 136 136 136   POTASSIUM mmol/L 4.2 4.0 3.8   CHLORIDE mmol/L 106 105 103   CO2 mmol/L 21.0* 23.0 22.0   BUN mg/dL 7 4* 4*   CREATININE mg/dL 1.77* 0.89 0.91   CALCIUM mg/dL 8.0* 8.8 8.7   GLUCOSE mg/dL 89 97 93     Results from last 7 days   Lab Units 12/02/21  0617 12/01/21  0629 11/30/21  0611   WBC 10*3/mm3 12.50* 4.57 4.45   HEMOGLOBIN g/dL 8.5* 9.2* 9.3*   HEMATOCRIT % 28.0* 29.5* 29.0*   PLATELETS 10*3/mm3 98* 95* 119*         Lab Results   Component Value Date    BLOODCX No growth at 5 days 11/18/2021     No results found for: URINECX    I reviewed the patient's new imaging including images and reports.  No recent films    All medications reviewed.   Pharmacy Consult, , Does not apply, BID  aspirin, 81 mg, Nasogastric, Daily  atorvastatin, 80 mg, Nasogastric, Nightly  chlorhexidine, 15 mL, Mouth/Throat, Q12H  famotidine, 20 mg, Nasogastric, Daily  midodrine, 15 mg, Nasogastric, Q8H  oxymetazoline, 2 spray, Each Nare, BID  sodium chloride, 10 mL, Intravenous, Q12H          Assessment/Plan       Acute renal failure superimposed on ESRD    Acute on chronic respiratory failure with hypoxia     Volume overload    S/P R BKA     COPD on 2L NC baseline     Idiopathic hypotension on midodrine     Hyperkalemia    Chronic HCV     Anticoagulated on Eliquis (h/o DVT/PE)     Former smoker    Right atrial mass    Tricuspid valve mass    Pancytopenia (HCC)      #1 end-stage renal disease on hemodialysis through a right IJ tunneled access that was changed over a wire by IR because it was thrombosed.  He was requiring CRRT until December 1  early.  He became hypotensive and dialysis had to be stopped..     #2 shock with profound hypotension on multiple pressors.  At baseline he has idiopathic hypotension and takes midodrine.  His echocardiogram revealed a clot on the tricuspid valve and in the right atrium but an EF of 46 to 50%.  He did require transfusion for nosebleed.      #3 respiratory failure, acute on chronic currently on mechanical ventilation, 100% FiO2 rate 16 tidal volume 400 and PEEP of 8.  Now his oxygen saturation is in the mid 80s and occasionally at 90%.  End-tidal CO2 is measuring 18.  This is a very ominous sign and I think just multiorgan failure from his refractory shock and 3 pressors with vasoconstriction.        PLAN:    Very grateful for palliative care assistance.  They have been speaking with the family and trying to establish goals of care.  At this point with his profound hypoxia on 100%, marginal blood pressure on 3 pressors we have literally nowhere else to go and I feel that further aggressive treatment is futile.  Family had made the decision for no CPR but they are not yet ready to withdraw care.  Unfortunately I have little to offer        Isabela Renner MD  12/02/21  17:06 EST      Time: Critical care 25 min  I personally provided care to this critically ill patient as documented above.  Critical care time does not include time spent on separately billed procedures.  None of my critical care time was concurrent with other critical care providers.

## 2021-12-02 NOTE — PROGRESS NOTES
Talk to to the patient's sisters who are at bedside.  Updated them on the patient's overall poor prognosis and the fact that as of right now he is not able to tolerate dialysis due to his hypotension.  Discussed that if that continues to be an issue going through tonight into tomorrow then will be at the point where medically there is nothing else we can do for the patient as he will die without getting dialysis.  Also discussed the fact that the patient has been on the ventilator with an ET tube for about 12 to 13 days.  Discussed complications with ET tube for a prolonged period of time as well as briefly talk about trach.  Plan is to call the patient's family tomorrow for family meeting, most likely over the phone, at 3:00.    Kj Ordonez, DO  15:07 EST  12/2/21

## 2021-12-02 NOTE — PROGRESS NOTES
Palliative Care Progress Note    Date of Admission: 11/18/2021    Subjective: Patient remains on the ventilator.  Overnight events noted.  Current Code Status     Date Active Code Status Order ID Comments User Context       11/27/2021 1653 No CPR (Do Not Attempt to Resuscitate) [id number]  Ellie Medina, [facility]patient     Advance Care Planning Activity      Questions for Current Code Status     Question Answer    Code Status (Patient has no pulse and is not breathing) No CPR (Do Not Attempt to Resuscitate)    Medical Interventions (Patient has pulse or is breathing) Limited Support    Medical Intervention Limits: Other    Additional Medical Interventions Limits: No escalation of care    Level Of Support Discussed With Health Care Surrogate        No current facility-administered medications on file prior to encounter.     Current Outpatient Medications on File Prior to Encounter   Medication Sig Dispense Refill   • albuterol sulfate  (90 Base) MCG/ACT inhaler Inhale 2 puffs Every 4 (Four) Hours As Needed for Wheezing.     • apixaban (ELIQUIS) 2.5 MG tablet tablet Take 2.5 mg by mouth 2 (Two) Times a Day.     • aspirin 81 MG chewable tablet Chew 1 tablet Daily. 30 tablet 0   • atorvastatin (LIPITOR) 80 MG tablet Take 1 tablet by mouth Every Night. 30 tablet 0   • Ca Carbonate-Mag Hydroxide 675-135 MG chewable tablet Chew 2 tablets As Needed (Heartburn).     • famotidine (PEPCID) 20 MG tablet Take 20 mg by mouth 2 (Two) Times a Day As Needed for Heartburn.     • HYDROcodone-acetaminophen (NORCO)  MG per tablet Take 1 tablet by mouth 4 (Four) Times a Day.     • midodrine (PROAMATINE) 5 MG tablet Take 3 tablets by mouth 3 (Three) Times a Day Before Meals. 270 tablet 0   • PHARMACY MEDS TO BED CONSULT Daily.     • pseudoephedrine (SUDAFED) 30 MG tablet Take 2 tablets by mouth Every 12 (Twelve) Hours. 120 tablet 0   • sevelamer (RENAGEL) 800 MG tablet Take  by mouth. Two tablets (1600 mg) by mouth TID with  "meals and 1 tablet (800 mg) by mouth daily with a snack       dextrose 5 % and sodium chloride 0.9 %, 100 mL/hr, Last Rate: 100 mL/hr (12/02/21 1236)  DOPamine, 2-20 mcg/kg/min (Order-Specific), Last Rate: 14 mcg/kg/min (12/02/21 0811)  fentanyl 10 mcg/mL,  mcg/hr, Last Rate: 300 mcg/hr (12/02/21 0621)  Pharmacy Consult, , Last Rate: Stopped (11/29/21 1123)  midazolam, 1-10 mg/hr, Last Rate: 10 mg/hr (12/02/21 0516)  norepinephrine, 0.02-0.5 mcg/kg/min (Order-Specific), Last Rate: 0.32 mcg/kg/min (12/02/21 0402)  phenylephrine, 0.5-3 mcg/kg/min (Order-Specific), Last Rate: 3 mcg/kg/min (12/02/21 1031)  propofol, 5-50 mcg/kg/min, Last Rate: 50 mcg/kg/min (12/02/21 1214)      anticoagulant citrate (ACD) formula A  •  dextrose  •  sodium chloride  •  sodium chloride    Objective: BP (!) 86/49   Pulse 77   Temp 99.1 °F (37.3 °C) (Bladder)   Resp 17   Ht 182.9 cm (72.01\")   Wt 69.3 kg (152 lb 12.5 oz)   SpO2 (!) 88%   BMI 20.72 kg/m²      Intake/Output Summary (Last 24 hours) at 12/2/2021 1244  Last data filed at 12/2/2021 1214  Gross per 24 hour   Intake 5207.3 ml   Output 633 ml   Net 4574.3 ml     Physical Exam:      General Appearance:   Intubated, unresponsive   Head:    Normocephalic, without obvious abnormality, atraumatic   Eyes:            Lids and lashes normal, conjunctivae and sclerae normal, no   icterus, no pallor, corneas clear, PERRLA   Ears:    Ears appear intact with no abnormalities noted   Throat:   No oral lesions, no thrush, oral mucosa moist   Neck:   No adenopathy, supple, trachea midline, no thyromegaly, no     carotid bruit, no JVD   Back:     No kyphosis present, no scoliosis present, no skin lesions,       erythema or scars, no tenderness to percussion or                   palpation,   range of motion normal   Lungs:     Clear to auscultation,respirations regular, even and                   unlabored    Heart:    Regular rhythm and normal rate, normal S1 and S2, no            " murmur, no gallop, no rub, no click   Breast Exam:    Deferred   Abdomen:     Normal bowel sounds, no masses, no organomegaly, soft        non-tender, non-distended, no guarding, no rebound                 tenderness   Genitalia:    Deferred   Extremities:  Positive edema in both lower extremities   Pulses:   Pulses palpable and equal bilaterally   Skin:   No bleeding, bruising or rash   Lymph nodes:   No palpable adenopathy         Results from last 7 days   Lab Units 12/02/21  0617   WBC 10*3/mm3 12.50*   HEMOGLOBIN g/dL 8.5*   HEMATOCRIT % 28.0*   PLATELETS 10*3/mm3 98*     Results from last 7 days   Lab Units 12/02/21  0617   SODIUM mmol/L 136   POTASSIUM mmol/L 4.2   CHLORIDE mmol/L 106   CO2 mmol/L 21.0*   BUN mg/dL 7   CREATININE mg/dL 1.77*   CALCIUM mg/dL 8.0*   GLUCOSE mg/dL 89       Impression: End-stage renal disease on dialysis  Respiratory failure  Change in mental status  Debility/weakness  Sepsis  Goals of care  Plan: Nursing staff has been talking with patient's family.  Report that the family should be arriving at some point today.  If palliative is available to talk to family when they arrive we will be more than happy to talk to them.        Kj Ordonez DO  12/02/21  12:44 EST

## 2021-12-02 NOTE — PLAN OF CARE
Goal Outcome Evaluation:  Plan of Care Reviewed With: patient        Progress: no change  Outcome Summary: Continues on vent with pressor support. Sats anywhere from 45-90%. BP 70s-80s. Sister updated last night, made her aware that it was very possible patient could pass at any time. Current care continues.

## 2021-12-03 NOTE — PLAN OF CARE
Goal Outcome Evaluation:  Plan of Care Reviewed With: patient, sibling        Progress: declining  Outcome Summary: Pts sisters visited today, updated on current status. Dr Ordonez spoke with family as well. Remains on 100% FiO2 and 3 pressor support.

## 2021-12-03 NOTE — PROGRESS NOTES
"   LOS: 15 days    Patient Care Team:  Provider, No Known as PCP - General    Chief Complaint:  SOA    Subjective     Interval History:     Continues to be on vent and pressor. BP low on max pressor support.       Review of Systems:    Review of systems could not be obtained due to  patient sedation status. patient intubated.    Objective     Vital Sign Min/Max for last 24 hours  Temp  Min: 97.9 °F (36.6 °C)  Max: 99 °F (37.2 °C)   BP  Min: 65/32  Max: 102/43   Pulse  Min: 74  Max: 92   Resp  Min: 16  Max: 20   SpO2  Min: 63 %  Max: 100 %   No data recorded   Weight  Min: 74 kg (163 lb 2.3 oz)  Max: 88.7 kg (195 lb 8.8 oz)     Flowsheet Rows      First Filed Value   Admission Height 182.9 cm (72\") Documented at 11/18/2021 1239   Admission Weight 63 kg (139 lb) Documented at 11/18/2021 1239          I/O this shift:  In: 441.6 [I.V.:381.6; Other:60]  Out: -   I/O last 3 completed shifts:  In: 8030.5 [I.V.:7910.5; Other:120]  Out: -     Physical Exam:  Gen: intubated and sedated.    HENT: NC, AT  NECK: Supple, ETT in place  LUNGS: Coarse breath sound on vent, non labored respirtation   CVS: S1/S2 audible, RRR, no murmur   Abd: Soft, NT, ND, BS+   Ext: Trace edema, no cyanosis   CNS: Intubated and sedated.   Psy: Intubated and sedated.   Skin: Warm, dry and intact      WBC WBC   Date Value Ref Range Status   12/02/2021 12.50 (H) 3.40 - 10.80 10*3/mm3 Final   12/01/2021 4.57 3.40 - 10.80 10*3/mm3 Final      HGB Hemoglobin   Date Value Ref Range Status   12/02/2021 8.5 (L) 13.0 - 17.7 g/dL Final   12/01/2021 9.2 (L) 13.0 - 17.7 g/dL Final      HCT Hematocrit   Date Value Ref Range Status   12/02/2021 28.0 (L) 37.5 - 51.0 % Final   12/01/2021 29.5 (L) 37.5 - 51.0 % Final      Platlets No results found for: LABPLAT   MCV MCV   Date Value Ref Range Status   12/02/2021 94.0 79.0 - 97.0 fL Final   12/01/2021 92.2 79.0 - 97.0 fL Final          Sodium Sodium   Date Value Ref Range Status   12/02/2021 136 136 - 145 mmol/L Final "   12/01/2021 136 136 - 145 mmol/L Final   12/01/2021 136 136 - 145 mmol/L Final   11/30/2021 136 136 - 145 mmol/L Final   11/30/2021 136 136 - 145 mmol/L Final      Potassium Potassium   Date Value Ref Range Status   12/02/2021 4.2 3.5 - 5.2 mmol/L Final   12/01/2021 4.0 3.5 - 5.2 mmol/L Final   12/01/2021 3.8 3.5 - 5.2 mmol/L Final   11/30/2021 4.0 3.5 - 5.2 mmol/L Final     Comment:     Slight hemolysis detected by analyzer. Results may be affected.   11/30/2021 3.8 3.5 - 5.2 mmol/L Final     Comment:     Slight hemolysis detected by analyzer. Results may be affected.      Chloride Chloride   Date Value Ref Range Status   12/02/2021 106 98 - 107 mmol/L Final   12/01/2021 105 98 - 107 mmol/L Final   12/01/2021 103 98 - 107 mmol/L Final   11/30/2021 103 98 - 107 mmol/L Final   11/30/2021 104 98 - 107 mmol/L Final      CO2 CO2   Date Value Ref Range Status   12/02/2021 21.0 (L) 22.0 - 29.0 mmol/L Final   12/01/2021 23.0 22.0 - 29.0 mmol/L Final   12/01/2021 22.0 22.0 - 29.0 mmol/L Final   11/30/2021 23.0 22.0 - 29.0 mmol/L Final   11/30/2021 23.0 22.0 - 29.0 mmol/L Final      BUN BUN   Date Value Ref Range Status   12/02/2021 7 6 - 20 mg/dL Final   12/01/2021 4 (L) 6 - 20 mg/dL Final   12/01/2021 4 (L) 6 - 20 mg/dL Final   11/30/2021 4 (L) 6 - 20 mg/dL Final   11/30/2021 4 (L) 6 - 20 mg/dL Final      Creatinine Creatinine   Date Value Ref Range Status   12/02/2021 1.77 (H) 0.76 - 1.27 mg/dL Final   12/01/2021 0.89 0.76 - 1.27 mg/dL Final   12/01/2021 0.91 0.76 - 1.27 mg/dL Final   11/30/2021 1.01 0.76 - 1.27 mg/dL Final   11/30/2021 0.93 0.76 - 1.27 mg/dL Final      Calcium Calcium   Date Value Ref Range Status   12/02/2021 8.0 (L) 8.6 - 10.5 mg/dL Final   12/01/2021 8.8 8.6 - 10.5 mg/dL Final   12/01/2021 8.7 8.6 - 10.5 mg/dL Final   11/30/2021 8.6 8.6 - 10.5 mg/dL Final   11/30/2021 8.8 8.6 - 10.5 mg/dL Final      PO4 No results found for: CAPO4   Albumin Albumin   Date Value Ref Range Status   12/02/2021 2.70 (L)  3.50 - 5.20 g/dL Final   12/01/2021 3.10 (L) 3.50 - 5.20 g/dL Final   12/01/2021 3.20 (L) 3.50 - 5.20 g/dL Final   11/30/2021 3.10 (L) 3.50 - 5.20 g/dL Final   11/30/2021 3.10 (L) 3.50 - 5.20 g/dL Final      Magnesium Magnesium   Date Value Ref Range Status   12/02/2021 2.4 1.6 - 2.6 mg/dL Final   12/01/2021 2.3 1.6 - 2.6 mg/dL Final   12/01/2021 2.5 1.6 - 2.6 mg/dL Final   11/30/2021 2.3 1.6 - 2.6 mg/dL Final   11/30/2021 2.2 1.6 - 2.6 mg/dL Final      Uric Acid No results found for: URICACID        Results Review:     I reviewed the patient's new clinical results.    Pharmacy Consult, , Does not apply, BID  aspirin, 81 mg, Nasogastric, Daily  atorvastatin, 80 mg, Nasogastric, Nightly  chlorhexidine, 15 mL, Mouth/Throat, Q12H  famotidine, 20 mg, Nasogastric, Daily  midodrine, 15 mg, Nasogastric, Q8H  sodium chloride, 10 mL, Intravenous, Q12H      dextrose 5 % and sodium chloride 0.9 %, 100 mL/hr, Last Rate: 100 mL/hr (12/03/21 0800)  DOPamine, 2-20 mcg/kg/min (Order-Specific), Last Rate: 14 mcg/kg/min (12/03/21 0121)  fentanyl 10 mcg/mL,  mcg/hr, Last Rate: 300 mcg/hr (12/03/21 0803)  midazolam, 1-10 mg/hr, Last Rate: 10 mg/hr (12/03/21 0800)  norepinephrine, 0.02-0.5 mcg/kg/min (Order-Specific), Last Rate: 0.32 mcg/kg/min (12/03/21 0610)  phenylephrine, 0.5-3 mcg/kg/min (Order-Specific), Last Rate: 3 mcg/kg/min (12/03/21 0416)  propofol, 5-50 mcg/kg/min, Last Rate: 50 mcg/kg/min (12/03/21 0800)        Medication Review:     Assessment/Plan       Acute renal failure superimposed on ESRD    Acute on chronic respiratory failure with hypoxia     Volume overload    S/P R BKA     COPD on 2L NC baseline     Idiopathic hypotension on midodrine     Hyperkalemia    Chronic HCV     Anticoagulated on Eliquis (h/o DVT/PE)     Former smoker    Right atrial mass    Tricuspid valve mass    Pancytopenia (HCC)      1- ESRD - [facility] - nephrology - [facility] +  2- Hypotension - on Pressor support. Not improving.   3- COPD   4- Chest  pain   5- hyperkalemia - resolved.   6- S/p right BKA   7- Anemia     Plan:  - Continue to hold CRRT. BP is too low with 3 pressor support to continue CRRT.   - Family meeting today. Palliative following.      Critically ill patient. Guarded to poor prognosis  I discussed the patients findings and my recommendations with patient and nursing staff    Monique Lane MD  12/03/21  11:53 EST

## 2021-12-03 NOTE — PLAN OF CARE
Problem: Adult Inpatient Plan of Care  Goal: Plan of Care Review  Outcome: Ongoing, Progressing  Flowsheets (Taken 12/3/2021 1703)  Progress: declining  Plan of Care Reviewed With:   patient   family  Outcome Summary: Fent@300, versed@10, propofol@50, levo@0.32, ines@3, dopamine@14, D5NS@100.  Pressors to be turned off at 1900, per Dr. Ordonez's discussion with the family, including the mother.  [facility] requesting liver enzymes.  Shelia with [facility] stated they would be contacting the family to introduce themselves. Continue to monitor.   Goal Outcome Evaluation:

## 2021-12-03 NOTE — PROGRESS NOTES
Palliative Care Progress Note    Date of Admission: 11/18/2021    Subjective: Patient remains on the ventilator.  Events noted  Current Code Status     Date Active Code Status Order ID Comments User Context       11/27/2021 1653 No CPR (Do Not Attempt to Resuscitate) [id number]  Ellie Medina APRN Inpatient     Advance Care Planning Activity      Questions for Current Code Status     Question Answer    Code Status (Patient has no pulse and is not breathing) No CPR (Do Not Attempt to Resuscitate)    Medical Interventions (Patient has pulse or is breathing) Limited Support    Medical Intervention Limits: Other    Additional Medical Interventions Limits: No escalation of care    Level Of Support Discussed With Health Care Surrogate        No current facility-administered medications on file prior to encounter.     Current Outpatient Medications on File Prior to Encounter   Medication Sig Dispense Refill   • albuterol sulfate  (90 Base) MCG/ACT inhaler Inhale 2 puffs Every 4 (Four) Hours As Needed for Wheezing.     • apixaban (ELIQUIS) 2.5 MG tablet tablet Take 2.5 mg by mouth 2 (Two) Times a Day.     • aspirin 81 MG chewable tablet Chew 1 tablet Daily. 30 tablet 0   • atorvastatin (LIPITOR) 80 MG tablet Take 1 tablet by mouth Every Night. 30 tablet 0   • Ca Carbonate-Mag Hydroxide 675-135 MG chewable tablet Chew 2 tablets As Needed (Heartburn).     • famotidine (PEPCID) 20 MG tablet Take 20 mg by mouth 2 (Two) Times a Day As Needed for Heartburn.     • HYDROcodone-acetaminophen (NORCO)  MG per tablet Take 1 tablet by mouth 4 (Four) Times a Day.     • midodrine (PROAMATINE) 5 MG tablet Take 3 tablets by mouth 3 (Three) Times a Day Before Meals. 270 tablet 0   • PHARMACY MEDS TO BED CONSULT Daily.     • pseudoephedrine (SUDAFED) 30 MG tablet Take 2 tablets by mouth Every 12 (Twelve) Hours. 120 tablet 0   • sevelamer (RENAGEL) 800 MG tablet Take  by mouth. Two tablets (1600 mg) by mouth TID with meals and 1  "tablet (800 mg) by mouth daily with a snack       dextrose 5 % and sodium chloride 0.9 %, 100 mL/hr, Last Rate: 100 mL/hr (12/03/21 0800)  DOPamine, 2-20 mcg/kg/min (Order-Specific), Last Rate: 14 mcg/kg/min (12/03/21 0121)  fentanyl 10 mcg/mL,  mcg/hr, Last Rate: 300 mcg/hr (12/03/21 0803)  midazolam, 1-10 mg/hr, Last Rate: 10 mg/hr (12/03/21 0800)  norepinephrine, 0.02-0.5 mcg/kg/min (Order-Specific), Last Rate: 0.32 mcg/kg/min (12/03/21 0610)  phenylephrine, 0.5-3 mcg/kg/min (Order-Specific), Last Rate: 3 mcg/kg/min (12/03/21 1429)  propofol, 5-50 mcg/kg/min, Last Rate: 50 mcg/kg/min (12/03/21 1435)      anticoagulant citrate (ACD) formula A  •  dextrose  •  sodium chloride  •  sodium chloride    Objective: BP (!) 83/27   Pulse 82   Temp 97.5 °F (36.4 °C) (Bladder)   Resp 18   Ht 182.9 cm (72.01\")   Wt 88.7 kg (195 lb 8.8 oz)   SpO2 100%   BMI 26.52 kg/m²      Intake/Output Summary (Last 24 hours) at 12/3/2021 1511  Last data filed at 12/3/2021 1200  Gross per 24 hour   Intake 5359.8 ml   Output --   Net 5359.8 ml     Physical Exam:      General Appearance:   Intubated, unresponsive   Head:    Normocephalic, without obvious abnormality, atraumatic   Eyes:            Lids and lashes normal, conjunctivae and sclerae normal, no   icterus, no pallor, corneas clear, PERRLA   Ears:    Ears appear intact with no abnormalities noted   Throat:   No oral lesions, no thrush, oral mucosa moist   Neck:   No adenopathy, supple, trachea midline, no thyromegaly, no     carotid bruit, no JVD   Back:     No kyphosis present, no scoliosis present, no skin lesions,       erythema or scars, no tenderness to percussion or                   palpation,   range of motion normal   Lungs:     Clear to auscultation,respirations regular, even and                   unlabored    Heart:    Regular rhythm and normal rate, normal S1 and S2, no            murmur, no gallop, no rub, no click   Breast Exam:    Deferred   Abdomen:     " Normal bowel sounds, no masses, no organomegaly, soft        non-tender, non-distended, no guarding, no rebound                 tenderness   Genitalia:    Deferred   Extremities:  Positive edema in both lower extremities   Pulses:   Pulses palpable and equal bilaterally   Skin:   No bleeding, bruising or rash   Lymph nodes:   No palpable adenopathy         Results from last 7 days   Lab Units 12/02/21  0617   WBC 10*3/mm3 12.50*   HEMOGLOBIN g/dL 8.5*   HEMATOCRIT % 28.0*   PLATELETS 10*3/mm3 98*     Results from last 7 days   Lab Units 12/02/21  0617   SODIUM mmol/L 136   POTASSIUM mmol/L 4.2   CHLORIDE mmol/L 106   CO2 mmol/L 21.0*   BUN mg/dL 7   CREATININE mg/dL 1.77*   CALCIUM mg/dL 8.0*   GLUCOSE mg/dL 89       Impression: End-stage renal disease on dialysis  Respiratory failure  Change in mental status  Debility/weakness  Sepsis  Goals of care  Plan: Talked with pts family including mother and multiple siblings.  Discussed that pt is continuing to decline and at this point what what we are doing to the patient is unethical as it is not helping the patient and only causing harm to him.  After a long and detailed discussion about this and the fact that on top of erythema the patient has not been able to have dialysis for the last 2 days due to his hypotension the family does agree with stopping pressors at 7:00 tonight.        Kj Ordonez,   12/03/21  15:11 EST

## 2021-12-03 NOTE — PROGRESS NOTES
Clinical Nutrition     Nutrition Support Assessment  Reason for Visit:   Follow-up protocol, NPO/Clear liquid x7 days      Patient Name: Lenin Ruelas  YOB: 1975  MRN: [id number]  Date of Encounter: 12/03/21 11:57 EST  Admission date: 11/18/2021        Nutrition Assessment   Assessment   A/c respiratory failure  Vent (11/19)  LUISITO/CKD  Volume overload  Hypotension  CRRT started (11/19) --> stopped (11/22) --> restarted (11/23)--> stopped (12/1)  R. atrial mass/tricuspid mass      PMH: He  has a past medical history of DVT (deep venous thrombosis) (HCC), ESRD (end stage renal disease) on dialysis (HCC), Hepatitis C, Nephrosclerosis, Perianal abscess, Pulmonary embolism (HCC), Renal disorder, and Substance abuse (HCC).   PSxH: He  has a past surgical history that includes Revision / takedown colostomy; Colostomy; AV fistula placement; and Below knee leg amputation (Right).     Applicable nutrition-related information:  (11/18) NPO 15 days    Reported/Observed/Food/Nutrition Related History:     Pt intubated, sedated, +fentanyl, versed, propofol 19.1ml, levophed 0.32mcg, ines 3mcg, dopamine 14mcg    Per RN: pt maxed on pressors, plan for family meeting today with Palliative Care    Anthropometrics   Height: 72in  Wt: 126lb bedscale  BMI: n/a 2nd BKA    Date Weight (kg) Weight (lbs) Weight Method   11/23/2021 66.4 kg 146 lb 6.2 oz -   11/21/2021 63.6 kg 140 lb 3.4 oz Bed scale   11/20/2021 61.5 kg 135 lb 9.3 oz Bed scale   11/19/2021 57.153 kg 126 lb -   11/18/2021 57.6 kg 126 lb 15.8 oz Bed scale   11/18/2021 63.05 kg 139 lb Estimated   10/28/2021 63.05 kg  139 lb  Bed scale   10/26/2021 64.91 kg 143 lb 1.6 oz Bed scale   10/25/2021 68.765 kg 151 lb 9.6 oz -   10/21/2021 66.7 kg 147 lb 0.8 oz -   10/20/2021 66.7 kg 147 lb 0.8 oz Bed scale   10/20/2021 81.647 kg 180 lb Stated     Labs reviewed     Results from last 7 days   Lab Units 12/02/21  0617 12/01/21  1352 12/01/21  0629   GLUCOSE  mg/dL 89 97 93   BUN mg/dL 7 4* 4*   CREATININE mg/dL 1.77* 0.89 0.91   SODIUM mmol/L 136 136 136   CHLORIDE mmol/L 106 105 103   POTASSIUM mmol/L 4.2 4.0 3.8   PHOSPHORUS mg/dL 4.6* 4.1 3.8   MAGNESIUM mg/dL 2.4 2.3 2.5     Results from last 7 days   Lab Units 12/02/21  0617 12/01/21  1352 12/01/21  0635 12/01/21  0629 11/30/21  2206   ALBUMIN g/dL 2.70* 3.10*  --  3.20*  --    IONIZED CALCIUM mmol/L 1.21 1.28 1.27  --    < >    < > = values in this interval not displayed.           No results found for: HGBA1C    Medications reviewed   Scheduled: pepcid, D5NS @100 ml/hr, fentanyl, versed, propofol, levophed, ines, dopamine    Needs Assessment 11/23   Height used: 72 in/183 cm  Weight used: 126 lb/58 kg    Estimated calorie needs: ~1650 kcal/day  PSU (Ve= 6.5, Tmax= 37.3)= 1648 kcal  25-30 kcal/kg= 6347-1748 kcal    Estimated protein needs:   1.2-1.5 g/kg= 69-87 g pro -- off CRRT  2.0 g/kg= 116 g pro -- for CRRT    Current Nutrition Prescription     PO: NPO Diet 15 days  No active supplement orders    Nutrition Diagnosis     11-19-21, 12-3  Problem Inadequate energy intake   Etiology Per Clinical Status: ARF/VENT/ Pressors   Signs/Symptoms NPO 15 days   Status: ongoing      Nutrition Intervention    Follow treatment progress, Care plan reviewed     Pt NPO 15 days, critically ill on 3 pressors    ?GOC    Consider PN, if decide to continue with agressive support    -PN recs if needed: D15%, AA5% at 60 ml/hr, no lipids    -Provides at goal:1440ml, 1022kcal, 72g protein, GIR=2.6    Propofol @19.1ml, will provide 504kcal    D5NS@100ml/hr provides 408kcal    Goal:   General: Nutrition support treatment    Establish GOC    Monitoring/Evaluation:   Per protocol, I&O, Pertinent labs, Weight, Symptoms, POC/GOC, Hemodynamic stability    Mirtha Mukherjee RD  Time Spent:45 minutes

## 2021-12-03 NOTE — PROGRESS NOTES
Intensive Care Follow-up     Hospital:  LOS: 15 days   Mr. Lenin Ruelas, 46 y.o. 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  He presented to the emergency room with worsening renal failure after missing hemodialysis.  He had significant hyperkalemia and was hypotensive with systolic blood pressure 68-73.  Bedside access could not be achieved because of his chronically occluded vessels.  He did go to IR and ultimately they were able to place an access and hemodialysis was initiated.  However he did require intubation mechanical ventilation initially.  Blood pressure worsened despite empiric broad-spectrum antibiotics.  He was placed on dopamine, norepinephrine, vasopressin and phenylephrine.  Echocardiogram revealed a 1.3 cm mass on his tricuspid valve and an additional mass affixed to his right atrium with reduced systolic function.  Cardiology felt they were consistent with blood clots.  Eliquis was transition to a heparin drip.  Unfortunately he developed a serious nosebleed requiring transfusion and nasal packing.  Heparin was held and then restarted and bleeding recurred and now he is off anticoagulation.  Despite 3 pressors he became hypotensive 2 days ago and CRRT had to be disconnected.  He also desaturated and despite 100% oxygen, did not increase above 90% despite 100% FiO2.      Subjective   Interval History:  Patient continues to maxed on pressors, addition to this continues to be severely hypotensive.  We are not reinitiating dialysis at this time.  He is currently on 100% FiO2.  There is no further escalation that we can provide at this point, as previously directed the goals of care  conversation.             The patient's past medical, surgical and social history were reviewed and updated in Epic as appropriate.       Objective     Infusions:  dextrose 5 % and sodium chloride 0.9 %, 100 mL/hr, Last Rate: 100 mL/hr (12/03/21 0800)  DOPamine, 2-20 mcg/kg/min (Order-Specific), Last Rate: 14 mcg/kg/min (12/03/21 0121)  fentanyl 10 mcg/mL,  mcg/hr, Last Rate: 300 mcg/hr (12/03/21 0803)  midazolam, 1-10 mg/hr, Last Rate: 10 mg/hr (12/03/21 0800)  norepinephrine, 0.02-0.5 mcg/kg/min (Order-Specific), Last Rate: 0.32 mcg/kg/min (12/03/21 0610)  phenylephrine, 0.5-3 mcg/kg/min (Order-Specific), Last Rate: 3 mcg/kg/min (12/03/21 1429)  propofol, 5-50 mcg/kg/min, Last Rate: 50 mcg/kg/min (12/03/21 0800)      Medications:  Pharmacy Consult, , Does not apply, BID  aspirin, 81 mg, Nasogastric, Daily  atorvastatin, 80 mg, Nasogastric, Nightly  chlorhexidine, 15 mL, Mouth/Throat, Q12H  famotidine, 20 mg, Nasogastric, Daily  midodrine, 15 mg, Nasogastric, Q8H  sodium chloride, 10 mL, Intravenous, Q12H      I reviewed the patient's medications.    Vital Sign Min/Max for last 24 hours  Temp  Min: 97.5 °F (36.4 °C)  Max: 99 °F (37.2 °C)   BP  Min: 65/32  Max: 102/43   Pulse  Min: 75  Max: 92   Resp  Min: 16  Max: 20   SpO2  Min: 63 %  Max: 100 %   No data recorded       Input/Output for last 24 hour shift  12/02 0701 - 12/03 0700  In: 5622.2 [I.V.:5502.2]  Out: -    FiO2 (%):  [90 %-100 %] 90 %  S RR:  [16] 16  PEEP/CPAP (cm H2O):  [8 cm H20] 8 cm H20  MAP (cm H2O):  [14-16] 15  GENERAL : Sedated, lying in bed, NAD  RESPIRATORY/THORAX : ET tube in place, Coarse breath sounds bilaterally  CARDIOVASCULAR : Normal S1/S2, RRR. 2+ lower ext edema.  GASTROINTESTINAL : Soft, NT/ND. BS x 4 normoactive. No hepatosplenomegaly.  MUSCULOSKELETAL : No cyanosis, clubbing, or ischemia  NEUROLOGICAL: Sedated.          Results from last 7 days   Lab Units 12/02/21  0617 12/01/21  0629 11/30/21  0611   WBC 10*3/mm3 12.50*  4.57 4.45   HEMOGLOBIN g/dL 8.5* 9.2* 9.3*   PLATELETS 10*3/mm3 98* 95* 119*     Results from last 7 days   Lab Units 12/02/21  0617 12/01/21  1352 12/01/21  0629   SODIUM mmol/L 136 136 136   POTASSIUM mmol/L 4.2 4.0 3.8   CO2 mmol/L 21.0* 23.0 22.0   BUN mg/dL 7 4* 4*   CREATININE mg/dL 1.77* 0.89 0.91   MAGNESIUM mg/dL 2.4 2.3 2.5   PHOSPHORUS mg/dL 4.6* 4.1 3.8   GLUCOSE mg/dL 89 97 93     Estimated Creatinine Clearance: 64 mL/min (A) (by C-G formula based on SCr of 1.77 mg/dL (H)).          I reviewed the patient's new clinical results.  I reviewed the patient's new imaging results/reports including actual images and agree with reports.         Assessment/Plan   Impression        Acute renal failure superimposed on ESRD    Acute on chronic respiratory failure with hypoxia     Volume overload    S/P R BKA     COPD on 2L NC baseline     Idiopathic hypotension on midodrine     Hyperkalemia    Chronic HCV     Anticoagulated on Eliquis (h/o DVT/PE)     Former smoker    Right atrial mass    Tricuspid valve mass    Pancytopenia (HCC)       Plan        46-year-old male with a past medical history significant for hepatitis C virus, history of multiple DVT/PE on chronic Eliquis, right atrial mass, tricuspid valve mass, chronic hypoxic respiratory failure, status post right BKA and end-stage renal disease.  Patient is critically ill with ongoing acute hypoxic respiratory failure and profound shock on multiple pressors with multiorgan failure.    -We are currently maxed on all of her current options.  Per family discussions we have not escalating care any further although there is not much to offer at this point.  -Continue pressors  -Continue mechanical ventilation to maintain a saturation greater than 88%  -Continue ongoing goals of care conversation with the family, appreciate palliative care's recommendations  -We will continue to provide supportive care until the patient either passes or from the withdrawals.  At this  point I think death is imminent regardless of further intervention.  -Continuing to hold dialysis per renal recommendations given his severe level of critical illness.    Plan of care and goals reviewed with multidisciplinary/antibiotic stewardship team during rounds.   I discussed the patient's findings and my recommendations with nursing staff     High level of risk due to:  illness with threat to life or bodily function.      James Barajas, DO  Pulmonary, Critical care and Sleep Medicine

## 2021-12-03 NOTE — PLAN OF CARE
Goal Outcome Evaluation:  Plan of Care Reviewed With: family (Dr. Ordonez has a phone conference scheduled with family today)        Progress: declining  Outcome Summary: Pt declining; hypotensive, at times unable to detect BP while maxed on pressors. Unable to tolerate HD. Dr. Ordonez updated on status following call from unit RN Bel; scheduled to call family this afternoon and will inform them of further decline, likely imminently dying.    1300, Palliative team meeting: JARON Silva RN, CHPN; RAMSES Mock RN, CHPN; BRIA Infante; TOMAS Calabrese, RN, PN; SRIDEVI Ordonez, ; DADA Goldberg, RN; VALENTE Caban MD

## 2021-12-03 NOTE — CASE MANAGEMENT/SOCIAL WORK
Continued Stay Note  [facility]     Patient Name: Lenin Ruelas  MRN: [id number]  Today's Date: 12/3/2021    Admit Date: 11/18/2021     Discharge Plan     Row Name 12/03/21 1340       Plan    Plan TBD    Plan Comments Patient sedated on vent, pressors x 3.  CRRT stopped D/T BP too low to continue.  Palliative following, family meeting planned for today at 1500.  Discharge plan to be determined.  CM will continue to follow.               Discharge Codes    No documentation.               Expected Discharge Date and Time     Expected Discharge Date Expected Discharge Time    Dec 5, 2021             Priti Fisher RN

## 2021-12-03 NOTE — PLAN OF CARE
Goal Outcome Evaluation:  Plan of Care Reviewed With: patient        Progress: no change  Outcome Summary: pt remains on 100% FiO2 on the vent, no titrations made to any drips. family meeting today at 1500.

## 2021-12-04 NOTE — PROGRESS NOTES
"   LOS: 16 days    Patient Care Team:  Provider, No Known as PCP - General    Chief Complaint:  SOA    Subjective     Interval History:     Continues to be on vent - off pressors now       Review of Systems:    Review of systems could not be obtained due to  patient sedation status. patient intubated.    Objective     Vital Sign Min/Max for last 24 hours  Temp  Min: 97.3 °F (36.3 °C)  Max: 98 °F (36.7 °C)   BP  Min: 40/26  Max: 102/41   Pulse  Min: 70  Max: 96   Resp  Min: 16  Max: 16   SpO2  Min: 72 %  Max: 100 %   No data recorded   Weight  Min: 76.5 kg (168 lb 10.4 oz)  Max: 76.5 kg (168 lb 10.4 oz)     Flowsheet Rows      First Filed Value   Admission Height 182.9 cm (72\") Documented at 11/18/2021 1239   Admission Weight 63 kg (139 lb) Documented at 11/18/2021 1239          I/O this shift:  In: 92.8 [I.V.:92.8]  Out: -   I/O last 3 completed shifts:  In: 6104 [I.V.:5724; Other:380]  Out: 400 [Emesis/NG output:400]    Physical Exam:  Gen: intubated     HENT: NC, AT  NECK: Supple, ETT in place  LUNGS: Coarse breath sound on vent  CVS: S1/S2 audible, RRR, no murmur   Abd: Soft, NT, ND, BS+   Ext: +edema, no cyanosis   CNS: Intubated and sedated.   Psy: Intubated    Skin: Warm, dry and intact      WBC WBC   Date Value Ref Range Status   12/02/2021 12.50 (H) 3.40 - 10.80 10*3/mm3 Final      HGB Hemoglobin   Date Value Ref Range Status   12/02/2021 8.5 (L) 13.0 - 17.7 g/dL Final      HCT Hematocrit   Date Value Ref Range Status   12/02/2021 28.0 (L) 37.5 - 51.0 % Final      Platlets No results found for: LABPLAT   MCV MCV   Date Value Ref Range Status   12/02/2021 94.0 79.0 - 97.0 fL Final          Sodium Sodium   Date Value Ref Range Status   12/03/2021 138 136 - 145 mmol/L Final   12/02/2021 136 136 - 145 mmol/L Final   12/01/2021 136 136 - 145 mmol/L Final      Potassium Potassium   Date Value Ref Range Status   12/03/2021 4.5 3.5 - 5.2 mmol/L Final   12/02/2021 4.2 3.5 - 5.2 mmol/L Final   12/01/2021 4.0 3.5 - 5.2 " mmol/L Final      Chloride Chloride   Date Value Ref Range Status   12/03/2021 107 98 - 107 mmol/L Final   12/02/2021 106 98 - 107 mmol/L Final   12/01/2021 105 98 - 107 mmol/L Final      CO2 CO2   Date Value Ref Range Status   12/03/2021 17.0 (L) 22.0 - 29.0 mmol/L Final   12/02/2021 21.0 (L) 22.0 - 29.0 mmol/L Final   12/01/2021 23.0 22.0 - 29.0 mmol/L Final      BUN BUN   Date Value Ref Range Status   12/03/2021 17 6 - 20 mg/dL Final   12/02/2021 7 6 - 20 mg/dL Final   12/01/2021 4 (L) 6 - 20 mg/dL Final      Creatinine Creatinine   Date Value Ref Range Status   12/03/2021 2.88 (H) 0.76 - 1.27 mg/dL Final   12/02/2021 1.77 (H) 0.76 - 1.27 mg/dL Final   12/01/2021 0.89 0.76 - 1.27 mg/dL Final      Calcium Calcium   Date Value Ref Range Status   12/03/2021 8.6 8.6 - 10.5 mg/dL Final   12/02/2021 8.0 (L) 8.6 - 10.5 mg/dL Final   12/01/2021 8.8 8.6 - 10.5 mg/dL Final      PO4 No results found for: CAPO4   Albumin Albumin   Date Value Ref Range Status   12/03/2021 2.50 (L) 3.50 - 5.20 g/dL Final   12/02/2021 2.70 (L) 3.50 - 5.20 g/dL Final   12/01/2021 3.10 (L) 3.50 - 5.20 g/dL Final      Magnesium Magnesium   Date Value Ref Range Status   12/03/2021 1.9 1.6 - 2.6 mg/dL Final   12/02/2021 2.4 1.6 - 2.6 mg/dL Final   12/01/2021 2.3 1.6 - 2.6 mg/dL Final      Uric Acid No results found for: URICACID        Results Review:     I reviewed the patient's new clinical results.    Pharmacy Consult, , Does not apply, BID  aspirin, 81 mg, Nasogastric, Daily  atorvastatin, 80 mg, Nasogastric, Nightly  chlorhexidine, 15 mL, Mouth/Throat, Q12H  famotidine, 20 mg, Nasogastric, Daily  midodrine, 15 mg, Nasogastric, Q8H  sodium chloride, 10 mL, Intravenous, Q12H      dextrose 5 % and sodium chloride 0.9 %, 100 mL/hr, Last Rate: 100 mL/hr (12/03/21 0800)  fentanyl 10 mcg/mL,  mcg/hr, Last Rate: 300 mcg/hr (12/04/21 1032)  midazolam, 1-10 mg/hr, Last Rate: 10 mg/hr (12/04/21 1032)  propofol, 5-50 mcg/kg/min, Last Rate: 20  mcg/kg/min (12/04/21 1033)        Medication Review:     Assessment/Plan       Acute renal failure superimposed on ESRD    Acute on chronic respiratory failure with hypoxia     Volume overload    S/P R BKA     COPD on 2L NC baseline     Idiopathic hypotension on midodrine     Hyperkalemia    Chronic HCV     Anticoagulated on Eliquis (h/o DVT/PE)     Former smoker    Right atrial mass    Tricuspid valve mass    Pancytopenia (HCC)      1- ESRD - [facility] - nephrology - [facility] +  2- Hypotension - on Pressor support. Not improving.   3- COPD   4- Chest pain   5- hyperkalemia - resolved.   6- S/p right BKA   7- Anemia     Plan:  - Family is leaning of withdrawal of care. Off pressor now. BP low. Palliative following.   Will sign off.   Critically ill patient. Guarded to poor prognosis  Discussed with VINH Lane MD  12/04/21  11:35 EST

## 2021-12-04 NOTE — PLAN OF CARE
Goal Outcome Evaluation:  Plan of Care Reviewed With: patient           Outcome Summary: Pressors were turned off at 1900, SBP averaged 40s-50s all night. Remains on Fentanyl @300, Versed @10, and Propofol @40. Family planning to come today, will discuss options with GINA, but as of this evening they were still unsure of donation. No UOP, BM x1.  Will continue to monitor.

## 2021-12-04 NOTE — SIGNIFICANT NOTE
Exam confirms with auscultation zero audible heart tones and zero audible respirations. Mr.Maurice Ruelas was pronounced dead at 1206.  MD notified by Patient's RN.    Aleida Hameed RN  Clinical House Supervisor  12/4/2021 12:34 EST

## 2021-12-04 NOTE — DISCHARGE SUMMARY
Death Summary    Patient name: Lenin Ruelas  CSN: [id number]  MRN: [id number]  : 1975  Today's date: 2021     Date of Admission: 2021  Date and Time of Death:  2021 at 1206 pm    Admitting Physician: RAMSES Arroyo MD   Attending Physician: ABBY Barajas DO  Primary Care Provider: Provider, No Known  Consultations:   Dr. Grier- Cardiology  Dr. Lane- Nephrology  Dr. Ordonez- Palliative Care    Admission Diagnosis:   Acute renal failure superimposed on ESRD    Acute on chronic respiratory failure with hypoxia     Volume overload    Hyperkalemia    COPD on 2L NC baseline     Anticoagulated on Eliquis (h/o DVT/PE)     S/P R BKA     Idiopathic hypotension on midodrine     Chronic HCV     Former smoker     Diagnoses at the Time of Death:     Acute renal failure superimposed on ESRD    Acute on chronic respiratory failure with hypoxia     Volume overload    S/P R BKA     COPD on 2L NC baseline     Idiopathic hypotension on midodrine     Hyperkalemia    Chronic HCV     Anticoagulated on Eliquis (h/o DVT/PE)     Former smoker    Right atrial mass    Tricuspid valve mass    Pancytopenia (HCC)    Procedures:  CVC, arterial line  Intubation  Tunneled dialysis catheter placement       History of Present Illness:  Lenin Ruelas is a 46 y.o. male who presented to [facility] ED On  with complaints of generalized weakness.      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 IVC/SVC occlusion (noted on [facility]'s records), medical non-compliance, and ESRD on MWF dialysis for >10 years. He was recently admitted to  for volume overload 2* missed HD sessions in September and to our facility from 10/20-10/29 after missing multiple dialysis sessions found to have significant electrolyte disturbances and volume overload initially requiring ICU admission for CRRT and vasopressor use. He was placed on midodrine and followed by cardiology additionally given  caffeine and sudafed. 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 Throughout admission he remained hypotensive, however this was asymptomatic and he was able to participate in PT. Rehab was recommended however the patient would not agree to any facility apart from [facility] who declined him, therefore he was discharge home with  PT.      Since discharge the patient has missed several HD sessions and on ED presentation has worsening renal failure with sCr 18 (was 8 on 10/25), K 6.1, AG 22, alk phos 324, and PCT 2.54. Also hypotensive with BP 68-73 and hypoxic at 73% on RA. Troponin 0.154 and EKG unrevealing for obvious ischemia and T wave surprisingly not peaked. He was evaluated by nephrology who plans to initiate CRRT therefore he was admitted to the ICU for higher level of care.      COVID-19 testing on 11/18 negative. There was no vaccination information on file.     Hospital Course:  Patient was admitted to ICU as discussed in HPI.  On arrival he was intubated and placed on mechanical ventilation.  He developed sepsis requiring vasopressors.  Attempt was made to place nontunneled dialysis catheter however this was unsuccessful.  He required IR placement of tunneled dialysis catheter.  Patient was seen in consult by nephrology and begun on CRRT.  By hospital day #3 his blood pressure worsened despite multiple vasoactive drips and broad-spectrum antimicrobials.  His condition continued to be critical with a high likelihood of impending cardiac arrest.  Discussion was had with the patient's family who wished full code.  Dialysis was reinitiated, and the patient continued with marginal blood pressure despite maximal vasoactive drips.  On hospital day #4 he was seen in consult by palliative care who attempted to call the  patient's mother however he was unable to speak to her.  Following day the patient was off of CRRT without improvement in hemodynamics.  He was seen in consult by cardiology who noted patient was not a candidate for tricuspid valve repair due to tricuspid valve mass.  He remained on maximal support with multiple vasoactive drips, broad-spectrum antibiotics, and CRRT.  Despite these efforts his condition continued to decline and he remained critically ill with worsening oxygenation.  The futility of CPR was discussed with the patient's family and on hospital day 11 his CODE STATUS was changed to no CPR, and no escalation of care.  Hospital day #14 patient's sisters were able to visit.  His condition was discussed including his overall poor prognosis and inability to tolerate dialysis secondary to hypotension despite maximal vasoactive drips.  The following day family conference was had after which the family agreed to discontinue vasopressors at 7 PM that night.  Incremental discontinuation of medications (patient remained on comfort medications including fentanyl, Versed, and propofol) was performed per family's wishes.  On 2021 at 12:06 PM the patient was declared  (see hospital supervisor pronouncement note).    BRIA Álvarez, [facility] Center[facility]-BC  Pulmonary & Critical Care Medicine    Time: 40 Minutes

## 2021-12-06 LAB
6MAM SERPLBLD-MCNC: NEGATIVE NG/ML
AMPHETAMINES SERPL QL SCN: NEGATIVE NG/ML
BARBITURATES SERPL QL SCN: NEGATIVE UG/ML
BENZODIAZ SERPL QL SCN: NEGATIVE NG/ML
CANNABINOIDS SERPL QL SCN: NEGATIVE NG/ML
COCAINE+BZE SERPL QL SCN: ABNORMAL NG/ML
CODEINE SERPLBLD CFM-MCNC: NEGATIVE NG/ML
DHC SERPLBLD CFM-MCNC: 19.2 NG/ML
ETHANOL SERPL-MCNC: NEGATIVE GM/DL
HYDROCODONE SERPLBLD CFM-MCNC: 178.1 NG/ML
HYDROMORPHONE SERPLBLD CFM-MCNC: NEGATIVE NG/ML
METHADONE SERPL QL SCN: NEGATIVE NG/ML
MORPHINE SERPLBLD-MCNC: NEGATIVE NG/ML
OPIATES SERPL QL SCN: ABNORMAL NG/ML
OPIATES SPEC QL: POSITIVE
OXYCODONE+OXYMORPHONE SERPLBLD QL SCN: NEGATIVE NG/ML
PCP SERPL QL SCN: NEGATIVE NG/ML
PROPOXYPH SERPL QL SCN: NEGATIVE NG/ML